# Patient Record
Sex: MALE | Race: BLACK OR AFRICAN AMERICAN | NOT HISPANIC OR LATINO | Employment: OTHER | URBAN - METROPOLITAN AREA
[De-identification: names, ages, dates, MRNs, and addresses within clinical notes are randomized per-mention and may not be internally consistent; named-entity substitution may affect disease eponyms.]

---

## 2018-05-21 ENCOUNTER — TRANSCRIBE ORDERS (OUTPATIENT)
Dept: ADMINISTRATIVE | Facility: HOSPITAL | Age: 68
End: 2018-05-21

## 2018-05-21 DIAGNOSIS — M48.061 LUMBAR FORAMINAL STENOSIS: Primary | ICD-10-CM

## 2018-05-30 ENCOUNTER — HOSPITAL ENCOUNTER (OUTPATIENT)
Dept: MRI IMAGING | Facility: HOSPITAL | Age: 68
Discharge: HOME | End: 2018-05-30
Attending: NEUROLOGICAL SURGERY | Admitting: NEUROLOGICAL SURGERY

## 2018-05-30 DIAGNOSIS — M48.061 LUMBAR FORAMINAL STENOSIS: ICD-10-CM

## 2018-05-30 LAB — CREAT BLDA-MCNC: 0.8 MG/DL (ref 0.6–1.3)

## 2018-05-30 PROCEDURE — 82565 ASSAY OF CREATININE: CPT

## 2018-05-30 PROCEDURE — 0 GADOBENATE DIMEGLUMINE 529 MG/ML SOLUTION: Performed by: NEUROLOGICAL SURGERY

## 2018-05-30 PROCEDURE — A9577 INJ MULTIHANCE: HCPCS | Performed by: NEUROLOGICAL SURGERY

## 2018-05-30 PROCEDURE — 72158 MRI LUMBAR SPINE W/O & W/DYE: CPT

## 2018-05-30 RX ADMIN — GADOBENATE DIMEGLUMINE 20 ML: 529 INJECTION, SOLUTION INTRAVENOUS at 09:00

## 2018-09-05 ENCOUNTER — TRANSCRIBE ORDERS (OUTPATIENT)
Dept: ADMINISTRATIVE | Facility: HOSPITAL | Age: 68
End: 2018-09-05

## 2018-09-05 ENCOUNTER — HOSPITAL ENCOUNTER (OUTPATIENT)
Dept: GENERAL RADIOLOGY | Facility: HOSPITAL | Age: 68
Discharge: HOME | End: 2018-09-05
Attending: NEUROLOGICAL SURGERY | Admitting: NEUROLOGICAL SURGERY

## 2018-09-05 DIAGNOSIS — M54.9 BACK PAIN DUE TO INJURY: Primary | ICD-10-CM

## 2018-09-05 DIAGNOSIS — M54.9 BACK PAIN DUE TO INJURY: ICD-10-CM

## 2018-09-05 PROCEDURE — 72114 X-RAY EXAM L-S SPINE BENDING: CPT

## 2019-12-10 ENCOUNTER — PREP FOR SURGERY (OUTPATIENT)
Dept: OTHER | Facility: HOSPITAL | Age: 69
End: 2019-12-10

## 2019-12-10 DIAGNOSIS — D12.6 ADENOMATOUS POLYP OF COLON, UNSPECIFIED PART OF COLON: Primary | ICD-10-CM

## 2020-01-08 PROBLEM — D12.6 ADENOMATOUS POLYP OF COLON: Status: ACTIVE | Noted: 2020-01-08

## 2020-03-11 ENCOUNTER — ANESTHESIA (OUTPATIENT)
Dept: GASTROENTEROLOGY | Facility: HOSPITAL | Age: 70
End: 2020-03-11

## 2020-03-11 ENCOUNTER — ANESTHESIA EVENT (OUTPATIENT)
Dept: GASTROENTEROLOGY | Facility: HOSPITAL | Age: 70
End: 2020-03-11

## 2020-03-11 ENCOUNTER — HOSPITAL ENCOUNTER (OUTPATIENT)
Facility: HOSPITAL | Age: 70
Setting detail: HOSPITAL OUTPATIENT SURGERY
Discharge: HOME | End: 2020-03-11
Attending: INTERNAL MEDICINE | Admitting: INTERNAL MEDICINE

## 2020-03-11 VITALS
BODY MASS INDEX: 31.38 KG/M2 | RESPIRATION RATE: 14 BRPM | HEIGHT: 74 IN | DIASTOLIC BLOOD PRESSURE: 88 MMHG | HEART RATE: 57 BPM | OXYGEN SATURATION: 98 % | SYSTOLIC BLOOD PRESSURE: 155 MMHG | WEIGHT: 244.5 LBS | TEMPERATURE: 97.6 F

## 2020-03-11 DIAGNOSIS — D12.6 ADENOMATOUS POLYP OF COLON, UNSPECIFIED PART OF COLON: ICD-10-CM

## 2020-03-11 PROCEDURE — 45385 COLONOSCOPY W/LESION REMOVAL: CPT | Performed by: INTERNAL MEDICINE

## 2020-03-11 PROCEDURE — 88305 TISSUE EXAM BY PATHOLOGIST: CPT | Performed by: INTERNAL MEDICINE

## 2020-03-11 PROCEDURE — S0260 H&P FOR SURGERY: HCPCS | Performed by: INTERNAL MEDICINE

## 2020-03-11 PROCEDURE — 25010000002 PROPOFOL 10 MG/ML EMULSION: Performed by: ANESTHESIOLOGY

## 2020-03-11 RX ORDER — PROPOFOL 10 MG/ML
VIAL (ML) INTRAVENOUS CONTINUOUS PRN
Status: DISCONTINUED | OUTPATIENT
Start: 2020-03-11 | End: 2020-03-11 | Stop reason: SURG

## 2020-03-11 RX ORDER — LIDOCAINE HYDROCHLORIDE 20 MG/ML
INJECTION, SOLUTION INFILTRATION; PERINEURAL AS NEEDED
Status: DISCONTINUED | OUTPATIENT
Start: 2020-03-11 | End: 2020-03-11 | Stop reason: SURG

## 2020-03-11 RX ORDER — PROPOFOL 10 MG/ML
VIAL (ML) INTRAVENOUS AS NEEDED
Status: DISCONTINUED | OUTPATIENT
Start: 2020-03-11 | End: 2020-03-11 | Stop reason: SURG

## 2020-03-11 RX ORDER — SODIUM CHLORIDE, SODIUM LACTATE, POTASSIUM CHLORIDE, CALCIUM CHLORIDE 600; 310; 30; 20 MG/100ML; MG/100ML; MG/100ML; MG/100ML
1000 INJECTION, SOLUTION INTRAVENOUS CONTINUOUS
Status: DISCONTINUED | OUTPATIENT
Start: 2020-03-11 | End: 2020-03-11 | Stop reason: HOSPADM

## 2020-03-11 RX ADMIN — PROPOFOL 100 MG: 10 INJECTION, EMULSION INTRAVENOUS at 09:50

## 2020-03-11 RX ADMIN — SODIUM CHLORIDE, POTASSIUM CHLORIDE, SODIUM LACTATE AND CALCIUM CHLORIDE 1000 ML: 600; 310; 30; 20 INJECTION, SOLUTION INTRAVENOUS at 09:36

## 2020-03-11 RX ADMIN — PROPOFOL 100 MCG/KG/MIN: 10 INJECTION, EMULSION INTRAVENOUS at 09:50

## 2020-03-11 RX ADMIN — LIDOCAINE HYDROCHLORIDE 80 MG: 20 INJECTION, SOLUTION INFILTRATION; PERINEURAL at 09:50

## 2020-03-11 NOTE — ANESTHESIA POSTPROCEDURE EVALUATION
"Patient: Orion Casiano    Procedure Summary     Date:  03/11/20 Room / Location:  [facility] ENDOSCOPY 10 / [facility] ENDOSCOPY    Anesthesia Start:  0939 Anesthesia Stop:  1022    Procedure:  COLONOSCOPY TO CECUM AND TI WITH COLD AND HOT SNARE AND POLYPECTOMIES (N/A ) Diagnosis:       Adenomatous polyp of colon, unspecified part of colon      (Adenomatous polyp of colon, unspecified part of colon [D12.6])    Surgeon:  Patti Navarro MD Provider:  Brandin Merida MD    Anesthesia Type:  MAC ASA Status:  3          Anesthesia Type: MAC    Vitals  Vitals Value Taken Time   /70 3/11/2020 10:22 AM   Temp     Pulse 62 3/11/2020 10:22 AM   Resp 14 3/11/2020 10:22 AM   SpO2 99 % 3/11/2020 10:22 AM           Post Anesthesia Care and Evaluation    Patient location during evaluation: bedside  Patient participation: complete - patient participated  Level of consciousness: awake and alert  Pain management: adequate  Airway patency: patent  Anesthetic complications: No anesthetic complications    Cardiovascular status: acceptable  Respiratory status: acceptable  Hydration status: acceptable    Comments: /70   Pulse 62   Temp 36.4 °C (97.6 °F) (Oral)   Resp 14   Ht 188 cm (74\")   Wt 111 kg (244 lb 8 oz)   SpO2 99%   BMI 31.39 kg/m²       "

## 2020-03-11 NOTE — ANESTHESIA PREPROCEDURE EVALUATION
Anesthesia Evaluation     Patient summary reviewed and Nursing notes reviewed   no history of anesthetic complications:  NPO Solid Status: > 8 hours  NPO Liquid Status: > 2 hours           Airway   Mallampati: II  Dental      Pulmonary - normal exam   (+) asthma,  Cardiovascular - normal exam    (+) hypertension 2 medications or greater, hyperlipidemia,       Neuro/Psych- negative ROS  GI/Hepatic/Renal/Endo    (+) obesity,       Musculoskeletal     Abdominal    Substance History      OB/GYN          Other                        Anesthesia Plan    ASA 3     MAC     intravenous induction     Anesthetic plan, all risks, benefits, and alternatives have been provided, discussed and informed consent has been obtained with: patient.

## 2020-03-11 NOTE — H&P
"[facility]  Pre Procedure History & Physical    Chief Complaint:   H/o adenomatous polyp    Subjective     HPI:   70 yo here today for colonoscopy.  Pt reports no FH CRC/polyps.  Patient denies GI symptoms currrently.  Last exam 2016- [place] x5    Past Medical History:   Past Medical History:   Diagnosis Date   • Asthma    • Gout    • Hyperlipidemia    • Hypertension        Past Surgical History:  Past Surgical History:   Procedure Laterality Date   • COLONOSCOPY      maybe 2016   • LUMBAR DISC SURGERY      2008       Family History:  Family History   Family history unknown: Yes       Social History:   reports that he has never smoked. He has never used smokeless tobacco. He reports that he drinks alcohol. Drug use questions deferred to the physician.    Medications:   Medications Prior to Admission   Medication Sig Dispense Refill Last Dose   • allopurinol (ZYLOPRIM) 100 MG tablet    3/11/2020 at Unknown time   • amLODIPine (NORVASC) 10 MG tablet Take  by mouth Daily.   3/10/2020 at Unknown time   • hydroCHLOROthiazide (HYDRODIURIL) 25 MG tablet Take 2 tablets by mouth Daily. 60 tablet 0 3/10/2020 at Unknown time   • lisinopril (PRINIVIL,ZESTRIL) 10 MG tablet    3/10/2020 at Unknown time   • montelukast (SINGULAIR) 10 MG tablet Take  by mouth Daily.   3/10/2020 at Unknown time   • rosuvastatin (CRESTOR) 10 MG tablet    3/10/2020 at Unknown time   • albuterol sulfate HFA (VENTOLIN HFA) 108 (90 Base) MCG/ACT inhaler 2 (Two) Times a Day.   More than a month at Unknown time   • methylPREDNISolone (MEDROL, PJ,) 4 MG tablet Take as directed on package instructions. 21 each 0        Allergies:  Aspirin    ROS:    Pertinent items are noted in HPI, all other systems reviewed and negative     Objective     Blood pressure 156/84, pulse 57, temperature 97.6 °F (36.4 °C), temperature source Oral, resp. rate 15, height 188 cm (74\"), weight 111 kg (244 lb 8 oz), SpO2 100 %.    Physical Exam   Constitutional: " Pt is oriented to person, place, and time and well-developed, well-nourished, and in no distress.   Mouth/Throat: Oropharynx is clear and moist.   Neck: Normal range of motion.   Cardiovascular: Normal rate, regular rhythm    Pulmonary/Chest: Effort normal    Abdominal: Soft. Nontender  Skin: Skin is warm and dry.   Psychiatric: Mood, memory, affect and judgment normal.     Assessment/Plan     Diagnosis:  h/o of adenomatous polyps    Anticipated Surgical Procedure:  colonoscopy    The risks, benefits, and alternatives of this procedure have been discussed with the patient or the responsible party- the patient understands and agrees to proceed.

## 2020-03-12 LAB
CYTO UR: NORMAL
LAB AP CASE REPORT: NORMAL
PATH REPORT.FINAL DX SPEC: NORMAL
PATH REPORT.GROSS SPEC: NORMAL

## 2020-03-16 ENCOUNTER — TELEPHONE (OUTPATIENT)
Dept: GASTROENTEROLOGY | Facility: CLINIC | Age: 70
End: 2020-03-16

## 2020-03-18 NOTE — TELEPHONE ENCOUNTER
Called pt and advised of Dr Navarro's note. Pt verb understanding.     C/s placed in recall for 03/11/2023.

## 2020-07-29 ENCOUNTER — TRANSCRIBE ORDERS (OUTPATIENT)
Dept: ADMINISTRATIVE | Facility: HOSPITAL | Age: 70
End: 2020-07-29

## 2020-07-29 DIAGNOSIS — M48.061 SPINAL STENOSIS, LUMBAR REGION, WITHOUT NEUROGENIC CLAUDICATION: ICD-10-CM

## 2020-07-29 DIAGNOSIS — M48.02 STENOSIS OF CERVICAL SPINE: Primary | ICD-10-CM

## 2020-08-08 ENCOUNTER — APPOINTMENT (OUTPATIENT)
Dept: MRI IMAGING | Facility: HOSPITAL | Age: 70
End: 2020-08-08

## 2020-09-08 ENCOUNTER — LAB (OUTPATIENT)
Dept: LAB | Facility: HOSPITAL | Age: 70
End: 2020-09-08

## 2020-09-08 ENCOUNTER — HOSPITAL ENCOUNTER (OUTPATIENT)
Dept: GENERAL RADIOLOGY | Facility: HOSPITAL | Age: 70
Discharge: HOME | End: 2020-09-08

## 2020-09-08 ENCOUNTER — TRANSCRIBE ORDERS (OUTPATIENT)
Dept: CARDIOLOGY | Facility: HOSPITAL | Age: 70
End: 2020-09-08

## 2020-09-08 ENCOUNTER — HOSPITAL ENCOUNTER (OUTPATIENT)
Dept: CARDIOLOGY | Facility: HOSPITAL | Age: 70
Discharge: HOME | End: 2020-09-08

## 2020-09-08 DIAGNOSIS — R79.1 ABNORMAL COAGULATION PROFILE: ICD-10-CM

## 2020-09-08 DIAGNOSIS — Z01.818 PRE-OP EXAM: ICD-10-CM

## 2020-09-08 DIAGNOSIS — G72.9 CERVICAL MYOPATHY: ICD-10-CM

## 2020-09-08 DIAGNOSIS — Z01.811 PRE-OP CHEST EXAM: ICD-10-CM

## 2020-09-08 DIAGNOSIS — Z01.818 PRE-OP EXAM: Primary | ICD-10-CM

## 2020-09-08 DIAGNOSIS — R94.5 ABNORMAL RESULTS OF LIVER FUNCTION STUDIES: ICD-10-CM

## 2020-09-08 LAB
ALBUMIN SERPL-MCNC: 4 G/DL (ref 3.5–5.2)
ALBUMIN/GLOB SERPL: 1.3 G/DL
ALP SERPL-CCNC: 79 U/L (ref 39–117)
ALT SERPL W P-5'-P-CCNC: 36 U/L (ref 1–41)
ANION GAP SERPL CALCULATED.3IONS-SCNC: 7.8 MMOL/L (ref 5–15)
APTT PPP: 24.4 SECONDS (ref 22.7–35.4)
AST SERPL-CCNC: 33 U/L (ref 1–40)
BILIRUB SERPL-MCNC: 1.3 MG/DL (ref 0–1.2)
BILIRUB UR QL STRIP: NEGATIVE
BUN SERPL-MCNC: 15 MG/DL (ref 8–23)
BUN/CREAT SERPL: 15.6 (ref 7–25)
CALCIUM SPEC-SCNC: 9.6 MG/DL (ref 8.6–10.5)
CHLORIDE SERPL-SCNC: 97 MMOL/L (ref 98–107)
CLARITY UR: CLEAR
CO2 SERPL-SCNC: 29.2 MMOL/L (ref 22–29)
COLOR UR: YELLOW
CREAT SERPL-MCNC: 0.96 MG/DL (ref 0.76–1.27)
DEPRECATED RDW RBC AUTO: 40.6 FL (ref 37–54)
ERYTHROCYTE [DISTWIDTH] IN BLOOD BY AUTOMATED COUNT: 11.9 % (ref 12.3–15.4)
GFR SERPL CREATININE-BSD FRML MDRD: 94 ML/MIN/1.73
GLOBULIN UR ELPH-MCNC: 3.1 GM/DL
GLUCOSE SERPL-MCNC: 108 MG/DL (ref 65–99)
GLUCOSE UR STRIP-MCNC: NEGATIVE MG/DL
HCT VFR BLD AUTO: 41.5 % (ref 37.5–51)
HGB BLD-MCNC: 13.5 G/DL (ref 13–17.7)
HGB UR QL STRIP.AUTO: NEGATIVE
INR PPP: 1.04 (ref 0.9–1.1)
KETONES UR QL STRIP: NEGATIVE
LEUKOCYTE ESTERASE UR QL STRIP.AUTO: NEGATIVE
MCH RBC QN AUTO: 29.9 PG (ref 26.6–33)
MCHC RBC AUTO-ENTMCNC: 32.5 G/DL (ref 31.5–35.7)
MCV RBC AUTO: 92 FL (ref 79–97)
NITRITE UR QL STRIP: NEGATIVE
PH UR STRIP.AUTO: 7 [PH] (ref 5–8)
PLATELET # BLD AUTO: 193 10*3/MM3 (ref 140–450)
PMV BLD AUTO: 11.4 FL (ref 6–12)
POTASSIUM SERPL-SCNC: 4.2 MMOL/L (ref 3.5–5.2)
PROT SERPL-MCNC: 7.1 G/DL (ref 6–8.5)
PROT UR QL STRIP: NEGATIVE
PROTHROMBIN TIME: 13.5 SECONDS (ref 11.7–14.2)
RBC # BLD AUTO: 4.51 10*6/MM3 (ref 4.14–5.8)
SODIUM SERPL-SCNC: 134 MMOL/L (ref 136–145)
SP GR UR STRIP: 1.02 (ref 1–1.03)
UROBILINOGEN UR QL STRIP: NORMAL
WBC # BLD AUTO: 5.29 10*3/MM3 (ref 3.4–10.8)

## 2020-09-08 PROCEDURE — 85027 COMPLETE CBC AUTOMATED: CPT

## 2020-09-08 PROCEDURE — 85730 THROMBOPLASTIN TIME PARTIAL: CPT

## 2020-09-08 PROCEDURE — 80053 COMPREHEN METABOLIC PANEL: CPT

## 2020-09-08 PROCEDURE — 36415 COLL VENOUS BLD VENIPUNCTURE: CPT

## 2020-09-08 PROCEDURE — 93010 ELECTROCARDIOGRAM REPORT: CPT | Performed by: INTERNAL MEDICINE

## 2020-09-08 PROCEDURE — 81003 URINALYSIS AUTO W/O SCOPE: CPT

## 2020-09-08 PROCEDURE — 71046 X-RAY EXAM CHEST 2 VIEWS: CPT

## 2020-09-08 PROCEDURE — 93005 ELECTROCARDIOGRAM TRACING: CPT | Performed by: NEUROLOGICAL SURGERY

## 2020-09-08 PROCEDURE — 85610 PROTHROMBIN TIME: CPT

## 2020-10-21 ENCOUNTER — HOSPITAL ENCOUNTER (OUTPATIENT)
Dept: GENERAL RADIOLOGY | Facility: HOSPITAL | Age: 70
Discharge: HOME | End: 2020-10-21
Admitting: NEUROLOGICAL SURGERY

## 2020-10-21 DIAGNOSIS — R52 PAIN: ICD-10-CM

## 2020-10-21 PROCEDURE — 72050 X-RAY EXAM NECK SPINE 4/5VWS: CPT

## 2020-11-16 ENCOUNTER — HOSPITAL ENCOUNTER (OUTPATIENT)
Dept: GENERAL RADIOLOGY | Facility: HOSPITAL | Age: 70
Discharge: HOME | End: 2020-11-16
Admitting: NEUROLOGICAL SURGERY

## 2020-11-16 DIAGNOSIS — N88.2 CERVICAL STENOSIS (UTERINE CERVIX): ICD-10-CM

## 2020-11-16 DIAGNOSIS — G72.9 CERVICAL MYOPATHY: ICD-10-CM

## 2020-11-16 PROCEDURE — 72050 X-RAY EXAM NECK SPINE 4/5VWS: CPT

## 2021-01-11 ENCOUNTER — HOSPITAL ENCOUNTER (OUTPATIENT)
Dept: GENERAL RADIOLOGY | Facility: HOSPITAL | Age: 71
Discharge: HOME | End: 2021-01-11
Admitting: NEUROLOGICAL SURGERY

## 2021-01-11 DIAGNOSIS — M54.2 NECK PAIN: ICD-10-CM

## 2021-01-11 PROCEDURE — 72040 X-RAY EXAM NECK SPINE 2-3 VW: CPT

## 2021-01-18 ENCOUNTER — TRANSCRIBE ORDERS (OUTPATIENT)
Dept: ADMINISTRATIVE | Facility: HOSPITAL | Age: 71
End: 2021-01-18

## 2021-01-18 ENCOUNTER — HOSPITAL ENCOUNTER (OUTPATIENT)
Dept: GENERAL RADIOLOGY | Facility: HOSPITAL | Age: 71
Discharge: HOME | End: 2021-01-18

## 2021-01-18 ENCOUNTER — HOSPITAL ENCOUNTER (OUTPATIENT)
Dept: CARDIOLOGY | Facility: HOSPITAL | Age: 71
Discharge: HOME | End: 2021-01-18

## 2021-01-18 ENCOUNTER — LAB (OUTPATIENT)
Dept: LAB | Facility: HOSPITAL | Age: 71
End: 2021-01-18

## 2021-01-18 DIAGNOSIS — Z01.818 PRE-OP TESTING: ICD-10-CM

## 2021-01-18 DIAGNOSIS — R79.1 ABNORMAL COAGULATION PROFILE: ICD-10-CM

## 2021-01-18 DIAGNOSIS — Z01.818 PREOP TESTING: ICD-10-CM

## 2021-01-18 DIAGNOSIS — Z01.818 PRE-OP TESTING: Primary | ICD-10-CM

## 2021-01-18 LAB
ALBUMIN SERPL-MCNC: 4.2 G/DL (ref 3.5–5.2)
ALBUMIN/GLOB SERPL: 1.5 G/DL
ALP SERPL-CCNC: 85 U/L (ref 39–117)
ALT SERPL W P-5'-P-CCNC: 25 U/L (ref 1–41)
ANION GAP SERPL CALCULATED.3IONS-SCNC: 7 MMOL/L (ref 5–15)
APTT PPP: 24.8 SECONDS (ref 22.7–35.4)
AST SERPL-CCNC: 36 U/L (ref 1–40)
BILIRUB SERPL-MCNC: 0.8 MG/DL (ref 0–1.2)
BILIRUB UR QL STRIP: NEGATIVE
BUN SERPL-MCNC: 13 MG/DL (ref 8–23)
BUN/CREAT SERPL: 15.7 (ref 7–25)
CALCIUM SPEC-SCNC: 9.3 MG/DL (ref 8.6–10.5)
CHLORIDE SERPL-SCNC: 101 MMOL/L (ref 98–107)
CLARITY UR: CLEAR
CO2 SERPL-SCNC: 28 MMOL/L (ref 22–29)
COLOR UR: YELLOW
CREAT SERPL-MCNC: 0.83 MG/DL (ref 0.76–1.27)
DEPRECATED RDW RBC AUTO: 40.5 FL (ref 37–54)
EOSINOPHIL # BLD MANUAL: 0.49 10*3/MM3 (ref 0–0.4)
EOSINOPHIL NFR BLD MANUAL: 8.2 % (ref 0.3–6.2)
ERYTHROCYTE [DISTWIDTH] IN BLOOD BY AUTOMATED COUNT: 11.9 % (ref 12.3–15.4)
GFR SERPL CREATININE-BSD FRML MDRD: 111 ML/MIN/1.73
GLOBULIN UR ELPH-MCNC: 2.8 GM/DL
GLUCOSE SERPL-MCNC: 96 MG/DL (ref 65–99)
GLUCOSE UR STRIP-MCNC: NEGATIVE MG/DL
HCT VFR BLD AUTO: 42.1 % (ref 37.5–51)
HGB BLD-MCNC: 13.3 G/DL (ref 13–17.7)
HGB UR QL STRIP.AUTO: NEGATIVE
INR PPP: 0.99 (ref 0.9–1.1)
KETONES UR QL STRIP: NEGATIVE
LEUKOCYTE ESTERASE UR QL STRIP.AUTO: NEGATIVE
LYMPHOCYTES # BLD MANUAL: 1.55 10*3/MM3 (ref 0.7–3.1)
LYMPHOCYTES NFR BLD MANUAL: 25.8 % (ref 19.6–45.3)
LYMPHOCYTES NFR BLD MANUAL: 7.2 % (ref 5–12)
MCH RBC QN AUTO: 29.7 PG (ref 26.6–33)
MCHC RBC AUTO-ENTMCNC: 31.6 G/DL (ref 31.5–35.7)
MCV RBC AUTO: 94 FL (ref 79–97)
MONOCYTES # BLD AUTO: 0.43 10*3/MM3 (ref 0.1–0.9)
NEUTROPHILS # BLD AUTO: 3.54 10*3/MM3 (ref 1.7–7)
NEUTROPHILS NFR BLD MANUAL: 58.8 % (ref 42.7–76)
NITRITE UR QL STRIP: NEGATIVE
PH UR STRIP.AUTO: 7 [PH] (ref 5–8)
PLAT MORPH BLD: NORMAL
PLATELET # BLD AUTO: 202 10*3/MM3 (ref 140–450)
PMV BLD AUTO: 11.6 FL (ref 6–12)
POTASSIUM SERPL-SCNC: 4.2 MMOL/L (ref 3.5–5.2)
PROT SERPL-MCNC: 7 G/DL (ref 6–8.5)
PROT UR QL STRIP: NEGATIVE
PROTHROMBIN TIME: 12.9 SECONDS (ref 11.7–14.2)
QT INTERVAL: 462 MS
RBC # BLD AUTO: 4.48 10*6/MM3 (ref 4.14–5.8)
RBC MORPH BLD: NORMAL
SODIUM SERPL-SCNC: 136 MMOL/L (ref 136–145)
SP GR UR STRIP: 1.02 (ref 1–1.03)
UROBILINOGEN UR QL STRIP: NORMAL
WBC # BLD AUTO: 6.02 10*3/MM3 (ref 3.4–10.8)
WBC MORPH BLD: NORMAL

## 2021-01-18 PROCEDURE — 81003 URINALYSIS AUTO W/O SCOPE: CPT

## 2021-01-18 PROCEDURE — 85610 PROTHROMBIN TIME: CPT

## 2021-01-18 PROCEDURE — 71046 X-RAY EXAM CHEST 2 VIEWS: CPT

## 2021-01-18 PROCEDURE — 85730 THROMBOPLASTIN TIME PARTIAL: CPT

## 2021-01-18 PROCEDURE — 36415 COLL VENOUS BLD VENIPUNCTURE: CPT

## 2021-01-18 PROCEDURE — 93005 ELECTROCARDIOGRAM TRACING: CPT | Performed by: NEUROLOGICAL SURGERY

## 2021-01-18 PROCEDURE — 93010 ELECTROCARDIOGRAM REPORT: CPT | Performed by: INTERNAL MEDICINE

## 2021-01-18 PROCEDURE — 85007 BL SMEAR W/DIFF WBC COUNT: CPT

## 2021-01-18 PROCEDURE — 85025 COMPLETE CBC W/AUTO DIFF WBC: CPT

## 2021-01-18 PROCEDURE — 80053 COMPREHEN METABOLIC PANEL: CPT

## 2023-02-16 ENCOUNTER — PRE-PROCEDURE SCREENING (OUTPATIENT)
Dept: GASTROENTEROLOGY | Facility: CLINIC | Age: 73
End: 2023-02-16
Payer: MEDICARE

## 2023-02-16 RX ORDER — FLUTICASONE FUROATE AND VILANTEROL 200; 25 UG/1; UG/1
POWDER RESPIRATORY (INHALATION)
COMMUNITY
Start: 2023-02-09

## 2023-02-16 RX ORDER — FLUNISOLIDE 0.25 MG/ML
SOLUTION NASAL
COMMUNITY
Start: 2023-01-06

## 2023-02-16 RX ORDER — TRAZODONE HYDROCHLORIDE 100 MG/1
100 TABLET ORAL DAILY
COMMUNITY
Start: 2023-02-14

## 2023-02-16 RX ORDER — AZATHIOPRINE 50 MG/1
TABLET ORAL DAILY
COMMUNITY
Start: 2022-12-23

## 2023-02-16 RX ORDER — DOXEPIN HYDROCHLORIDE 25 MG/1
CAPSULE ORAL
Status: ON HOLD | COMMUNITY
Start: 2022-12-02 | End: 2023-02-24

## 2023-02-16 RX ORDER — CLOBETASOL PROPIONATE 0.5 MG/G
1 OINTMENT TOPICAL DAILY
COMMUNITY
Start: 2023-01-06 | End: 2023-03-08

## 2023-02-16 RX ORDER — PREDNISONE 20 MG/1
TABLET ORAL
COMMUNITY
Start: 2023-02-09 | End: 2023-02-22 | Stop reason: SDUPTHER

## 2023-02-16 RX ORDER — ACARBOSE 25 MG/1
TABLET ORAL DAILY
COMMUNITY
Start: 2013-07-10 | End: 2023-03-08

## 2023-02-16 RX ORDER — AZELASTINE HYDROCHLORIDE 137 UG/1
2 SPRAY, METERED NASAL DAILY
COMMUNITY
Start: 2023-01-05 | End: 2023-04-09

## 2023-02-16 RX ORDER — CEPHALEXIN 500 MG/1
CAPSULE ORAL 2 TIMES DAILY
COMMUNITY
Start: 2023-02-23 | End: 2023-02-28 | Stop reason: HOSPADM

## 2023-02-16 RX ORDER — NADOLOL 80 MG/1
TABLET ORAL DAILY
COMMUNITY
Start: 2023-02-16 | End: 2023-02-28 | Stop reason: HOSPADM

## 2023-02-16 RX ORDER — KETOCONAZOLE 20 MG/ML
SHAMPOO TOPICAL
Status: ON HOLD | COMMUNITY
End: 2023-02-24

## 2023-02-16 RX ORDER — LISINOPRIL 10 MG/1
TABLET ORAL
Qty: 90 TABLET | Refills: 0 | Status: SHIPPED | OUTPATIENT
Start: 2023-02-16 | End: 2023-02-28 | Stop reason: HOSPADM

## 2023-02-16 RX ORDER — METHOCARBAMOL 500 MG/1
TABLET, FILM COATED ORAL DAILY
COMMUNITY
Start: 2023-01-18

## 2023-02-16 RX ORDER — TADALAFIL 20 MG/1
TABLET ORAL
COMMUNITY
Start: 2022-11-15

## 2023-02-16 NOTE — TELEPHONE ENCOUNTER
LAST SCOPE 3/20 IN Epic --Personal history of polyps--No family history of polyps or colon cancer--No blood thinners--Medications:            acarbose (PRECOSE) 25 MG tablet  albuterol sulfate HFA (VENTOLIN HFA) 108 (90 Base) MCG/ACT inhaler  allopurinol (ZYLOPRIM) 100 MG tablet  amLODIPine (NORVASC) 10 MG tablet  azaTHIOprine (IMURAN) 50 MG tablet  Azelastine HCl 137 MCG/SPRAY solution  cephalexin (KEFLEX) 500 MG capsule  clobetasol (TEMOVATE) 0.05 % ointment    doxepin (SINEquan) 25 MG capsule    flunisolide (NASALIDE) 25 MCG/ACT (0.025%) solution nasal spray    Fluticasone Furoate-Vilanterol (BREO ELLIPTA) 200-25 MCG/ACT inhaler  hydroCHLOROthiazide (HYDRODIURIL) 25 MG tablet  hydrocortisone 2.5 % ointment  ketoconazole (NIZORAL) 2 % shampoo  lisinopril (PRINIVIL,ZESTRIL) 10 MG tablet  methocarbamol (ROBAXIN) 500 MG tablet  methylPREDNISolone (MEDROL, PJ,) 4 MG tablet  montelukast (SINGULAIR) 10 MG tablet    nadolol (CORGARD) 80 MG tablet  predniSONE (DELTASONE) 20 MG tablet  rosuvastatin (CRESTOR) 10 MG tablet    tadalafil (CIALIS) 20 MG tablet    traZODone (DESYREL) 100 MG tablet      QUESTIONNAIRE SCEEENING IN MEDIA &   HAS BEEN SENT TO DOCTOR FOR REVIEW

## 2023-02-16 NOTE — TELEPHONE ENCOUNTER
Rx Refill Note  Requested Prescriptions     Pending Prescriptions Disp Refills   • lisinopril (PRINIVIL,ZESTRIL) 10 MG tablet [Pharmacy Med Name: LISINOPRIL 10 MG TABLET] 90 tablet      Sig: TAKE ONE TABLET BY MOUTH DAILY      Last office visit with prescribing clinician: 11/15/2022  Next office visit with prescribing clinician: Visit date not found   Last Labs Done: 11/15/2022 (Information found in [facility])    Tor Hancock CMA  02/16/23, 13:25 EST

## 2023-02-17 NOTE — TELEPHONE ENCOUNTER
Incoming Refill Request  {Tip  DIRECTIONS Type Rx details below. Pend Rx from patient's med list if dosage and other details match request. Jump to Medication Section:    Medication requested (name and dose): SINGULAR 10 MG ONCE A DAY    Pharmacy where request should be sent: [facility]  Additional details provided by patient:     Best call back number:     Does the patient have less than a 3 day supply:  [] Yes  [x] No    Man Reynoso Rep  02/17/23, 11:41 EST

## 2023-02-17 NOTE — TELEPHONE ENCOUNTER
Rx Refill Note  Requested Prescriptions     Pending Prescriptions Disp Refills   • montelukast (Singulair) 10 MG tablet       Sig: Take 1 tablet by mouth Every Night.      Last office visit with prescribing clinician: 11/15/2022  Next office visit with prescribing clinician: Visit date not found   Last Labs Done: 11/15/2022 (Information found in [facility])    Tor Hancock CMA  02/17/23, 13:17 EST

## 2023-02-18 RX ORDER — MONTELUKAST SODIUM 10 MG/1
10 TABLET ORAL NIGHTLY
Qty: 90 TABLET | Refills: 1 | Status: SHIPPED | OUTPATIENT
Start: 2023-02-18

## 2023-02-22 ENCOUNTER — HOSPITAL ENCOUNTER (EMERGENCY)
Facility: HOSPITAL | Age: 73
Discharge: HOME | DRG: 167 | End: 2023-02-22
Attending: EMERGENCY MEDICINE | Admitting: EMERGENCY MEDICINE
Payer: MEDICARE

## 2023-02-22 ENCOUNTER — APPOINTMENT (OUTPATIENT)
Dept: CT IMAGING | Facility: HOSPITAL | Age: 73
DRG: 167 | End: 2023-02-22
Payer: MEDICARE

## 2023-02-22 VITALS
RESPIRATION RATE: 20 BRPM | WEIGHT: 225 LBS | HEART RATE: 57 BPM | BODY MASS INDEX: 28.88 KG/M2 | SYSTOLIC BLOOD PRESSURE: 101 MMHG | TEMPERATURE: 97 F | HEIGHT: 74 IN | DIASTOLIC BLOOD PRESSURE: 89 MMHG | OXYGEN SATURATION: 91 %

## 2023-02-22 DIAGNOSIS — J84.9 INTERSTITIAL LUNG DISEASE: Primary | ICD-10-CM

## 2023-02-22 DIAGNOSIS — J18.9 PNEUMONIA OF RIGHT LOWER LOBE DUE TO INFECTIOUS ORGANISM: ICD-10-CM

## 2023-02-22 LAB
ALBUMIN SERPL-MCNC: 4.1 G/DL (ref 3.5–5.2)
ALBUMIN/GLOB SERPL: 1.3 G/DL
ALP SERPL-CCNC: 124 U/L (ref 39–117)
ALT SERPL W P-5'-P-CCNC: 16 U/L (ref 1–41)
ANION GAP SERPL CALCULATED.3IONS-SCNC: 10.3 MMOL/L (ref 5–15)
AST SERPL-CCNC: 24 U/L (ref 1–40)
B PARAPERT DNA SPEC QL NAA+PROBE: NOT DETECTED
B PERT DNA SPEC QL NAA+PROBE: NOT DETECTED
BASOPHILS # BLD AUTO: 0.08 10*3/MM3 (ref 0–0.2)
BASOPHILS NFR BLD AUTO: 1 % (ref 0–1.5)
BILIRUB SERPL-MCNC: 1.6 MG/DL (ref 0–1.2)
BUN SERPL-MCNC: 13 MG/DL (ref 8–23)
BUN/CREAT SERPL: 11.5 (ref 7–25)
C PNEUM DNA NPH QL NAA+NON-PROBE: NOT DETECTED
CALCIUM SPEC-SCNC: 9.9 MG/DL (ref 8.6–10.5)
CHLORIDE SERPL-SCNC: 103 MMOL/L (ref 98–107)
CO2 SERPL-SCNC: 27.7 MMOL/L (ref 22–29)
CREAT SERPL-MCNC: 1.13 MG/DL (ref 0.76–1.27)
DEPRECATED RDW RBC AUTO: 44.2 FL (ref 37–54)
EGFRCR SERPLBLD CKD-EPI 2021: 69.1 ML/MIN/1.73
EOSINOPHIL # BLD AUTO: 0.85 10*3/MM3 (ref 0–0.4)
EOSINOPHIL NFR BLD AUTO: 10.3 % (ref 0.3–6.2)
ERYTHROCYTE [DISTWIDTH] IN BLOOD BY AUTOMATED COUNT: 12.8 % (ref 12.3–15.4)
FLUAV SUBTYP SPEC NAA+PROBE: NOT DETECTED
FLUBV RNA ISLT QL NAA+PROBE: NOT DETECTED
GLOBULIN UR ELPH-MCNC: 3.2 GM/DL
GLUCOSE SERPL-MCNC: 92 MG/DL (ref 65–99)
HADV DNA SPEC NAA+PROBE: NOT DETECTED
HCOV 229E RNA SPEC QL NAA+PROBE: NOT DETECTED
HCOV HKU1 RNA SPEC QL NAA+PROBE: NOT DETECTED
HCOV NL63 RNA SPEC QL NAA+PROBE: NOT DETECTED
HCOV OC43 RNA SPEC QL NAA+PROBE: NOT DETECTED
HCT VFR BLD AUTO: 40.7 % (ref 37.5–51)
HGB BLD-MCNC: 13.6 G/DL (ref 13–17.7)
HMPV RNA NPH QL NAA+NON-PROBE: NOT DETECTED
HOLD SPECIMEN: NORMAL
HOLD SPECIMEN: NORMAL
HPIV1 RNA ISLT QL NAA+PROBE: NOT DETECTED
HPIV2 RNA SPEC QL NAA+PROBE: NOT DETECTED
HPIV3 RNA NPH QL NAA+PROBE: NOT DETECTED
HPIV4 P GENE NPH QL NAA+PROBE: NOT DETECTED
IMM GRANULOCYTES # BLD AUTO: 0.02 10*3/MM3 (ref 0–0.05)
IMM GRANULOCYTES NFR BLD AUTO: 0.2 % (ref 0–0.5)
LYMPHOCYTES # BLD AUTO: 2.6 10*3/MM3 (ref 0.7–3.1)
LYMPHOCYTES NFR BLD AUTO: 31.6 % (ref 19.6–45.3)
M PNEUMO IGG SER IA-ACNC: NOT DETECTED
MCH RBC QN AUTO: 31.4 PG (ref 26.6–33)
MCHC RBC AUTO-ENTMCNC: 33.4 G/DL (ref 31.5–35.7)
MCV RBC AUTO: 94 FL (ref 79–97)
MONOCYTES # BLD AUTO: 0.82 10*3/MM3 (ref 0.1–0.9)
MONOCYTES NFR BLD AUTO: 10 % (ref 5–12)
NEUTROPHILS NFR BLD AUTO: 3.87 10*3/MM3 (ref 1.7–7)
NEUTROPHILS NFR BLD AUTO: 46.9 % (ref 42.7–76)
NRBC BLD AUTO-RTO: 0.1 /100 WBC (ref 0–0.2)
NT-PROBNP SERPL-MCNC: 1275 PG/ML (ref 0–900)
PLATELET # BLD AUTO: 215 10*3/MM3 (ref 140–450)
PMV BLD AUTO: 10.3 FL (ref 6–12)
POTASSIUM SERPL-SCNC: 4.6 MMOL/L (ref 3.5–5.2)
PROT SERPL-MCNC: 7.3 G/DL (ref 6–8.5)
RBC # BLD AUTO: 4.33 10*6/MM3 (ref 4.14–5.8)
RHINOVIRUS RNA SPEC NAA+PROBE: NOT DETECTED
RSV RNA NPH QL NAA+NON-PROBE: NOT DETECTED
SARS-COV-2 RNA NPH QL NAA+NON-PROBE: NOT DETECTED
SODIUM SERPL-SCNC: 141 MMOL/L (ref 136–145)
WBC NRBC COR # BLD: 8.24 10*3/MM3 (ref 3.4–10.8)
WHOLE BLOOD HOLD COAG: NORMAL
WHOLE BLOOD HOLD SPECIMEN: NORMAL

## 2023-02-22 PROCEDURE — 36415 COLL VENOUS BLD VENIPUNCTURE: CPT

## 2023-02-22 PROCEDURE — 71275 CT ANGIOGRAPHY CHEST: CPT

## 2023-02-22 PROCEDURE — 25510000001 IOPAMIDOL PER 1 ML: Performed by: EMERGENCY MEDICINE

## 2023-02-22 PROCEDURE — 0 IOPAMIDOL PER 1 ML: Performed by: EMERGENCY MEDICINE

## 2023-02-22 PROCEDURE — 80053 COMPREHEN METABOLIC PANEL: CPT | Performed by: EMERGENCY MEDICINE

## 2023-02-22 PROCEDURE — 99283 EMERGENCY DEPT VISIT LOW MDM: CPT

## 2023-02-22 PROCEDURE — 94799 UNLISTED PULMONARY SVC/PX: CPT

## 2023-02-22 PROCEDURE — 0202U NFCT DS 22 TRGT SARS-COV-2: CPT | Performed by: EMERGENCY MEDICINE

## 2023-02-22 PROCEDURE — 83880 ASSAY OF NATRIURETIC PEPTIDE: CPT | Performed by: EMERGENCY MEDICINE

## 2023-02-22 PROCEDURE — 93005 ELECTROCARDIOGRAM TRACING: CPT | Performed by: EMERGENCY MEDICINE

## 2023-02-22 PROCEDURE — 85025 COMPLETE CBC W/AUTO DIFF WBC: CPT | Performed by: EMERGENCY MEDICINE

## 2023-02-22 PROCEDURE — 94640 AIRWAY INHALATION TREATMENT: CPT

## 2023-02-22 PROCEDURE — 36415 COLL VENOUS BLD VENIPUNCTURE: CPT | Performed by: EMERGENCY MEDICINE

## 2023-02-22 PROCEDURE — 93010 ELECTROCARDIOGRAM REPORT: CPT | Performed by: INTERNAL MEDICINE

## 2023-02-22 RX ORDER — PREDNISONE 20 MG/1
40 TABLET ORAL DAILY
Qty: 10 TABLET | Refills: 0 | Status: SHIPPED | OUTPATIENT
Start: 2023-02-22 | End: 2023-03-08

## 2023-02-22 RX ORDER — ALBUTEROL SULFATE 90 UG/1
2 AEROSOL, METERED RESPIRATORY (INHALATION) EVERY 6 HOURS PRN
Qty: 8 G | Refills: 0 | Status: SHIPPED | OUTPATIENT
Start: 2023-02-22

## 2023-02-22 RX ORDER — DOXYCYCLINE 100 MG/1
100 CAPSULE ORAL 2 TIMES DAILY
Qty: 14 CAPSULE | Refills: 0 | Status: SHIPPED | OUTPATIENT
Start: 2023-02-22 | End: 2023-02-28 | Stop reason: HOSPADM

## 2023-02-22 RX ORDER — ALBUTEROL SULFATE 2.5 MG/3ML
2.5 SOLUTION RESPIRATORY (INHALATION) ONCE
Status: COMPLETED | OUTPATIENT
Start: 2023-02-22 | End: 2023-02-22

## 2023-02-22 RX ADMIN — IOPAMIDOL 85 ML: 755 INJECTION, SOLUTION INTRAVENOUS at 20:08

## 2023-02-22 RX ADMIN — ALBUTEROL SULFATE 2.5 MG: 2.5 SOLUTION RESPIRATORY (INHALATION) at 19:19

## 2023-02-22 NOTE — ED TRIAGE NOTES
Pt c/o productive cough that started two weeks ago. Denies fever. Pt was seen at urgent care today and diagnosed with RLL pneumonia. Pt sent for further evaluation    This RN wore mask and goggles during time of contact

## 2023-02-23 ENCOUNTER — PREP FOR SURGERY (OUTPATIENT)
Dept: OTHER | Facility: HOSPITAL | Age: 73
End: 2023-02-23
Payer: MEDICARE

## 2023-02-23 DIAGNOSIS — Z86.010 HISTORY OF ADENOMATOUS POLYP OF COLON: Primary | ICD-10-CM

## 2023-02-23 LAB — QT INTERVAL: 471 MS

## 2023-02-23 NOTE — ED PROVIDER NOTES
EMERGENCY DEPARTMENT ENCOUNTER    Room Number:  18/18  Date seen:  2/23/2023  PCP: Niecy Galvan MD  Historian: Patient      HPI:  Chief Complaint: Cough and shortness of breath  A complete HPI/ROS/PMH/PSH/SH/FH are unobtainable due to: None  Context: Orion Casiano is a 72 y.o. male who presents to the ED c/o shortness of breath.  Patient states he has had cough and congestion for 3 weeks.  Patient states has had no fevers.  States he is coughing up whitish sputum.  Patient has had no chest pain or pressure.  Has had no lower extremity swelling.  Patient went to an urgent care today and was told he had pneumonia and sent here for evaluation.  Has had no weight loss.            PAST MEDICAL HISTORY  Active Ambulatory Problems     Diagnosis Date Noted   • Adenomatous polyp of colon 01/08/2020     Resolved Ambulatory Problems     Diagnosis Date Noted   • No Resolved Ambulatory Problems     Past Medical History:   Diagnosis Date   • Asthma    • Gout    • Hyperlipidemia    • Hypertension          PAST SURGICAL HISTORY  Past Surgical History:   Procedure Laterality Date   • COLONOSCOPY      maybe 2016   • COLONOSCOPY N/A 3/11/2020    Procedure: COLONOSCOPY TO CECUM AND TI WITH COLD AND HOT SNARE AND POLYPECTOMIES;  Surgeon: Patti Navarro MD;  Location: [facility] ENDOSCOPY;  Service: Gastroenterology;  Laterality: N/A;  PRE: H/O COLON POLYPS  POST: POLYPS, HEMORRHOIDS, DIVERTICULOSIS   • LUMBAR DISC SURGERY      2008         FAMILY HISTORY  Family History   Family history unknown: Yes         SOCIAL HISTORY  Social History     Socioeconomic History   • Marital status:    Tobacco Use   • Smoking status: Never   • Smokeless tobacco: Never   Substance and Sexual Activity   • Alcohol use: Yes     Comment: occassionally   • Drug use: Defer   • Sexual activity: Defer         ALLERGIES  Aspirin        REVIEW OF SYSTEMS  Review of Systems   Cough and congestion      PHYSICAL EXAM  ED Triage Vitals [02/22/23  1629]   Temp Heart Rate Resp BP SpO2   97 °F (36.1 °C) 54 20 105/66 100 %      Temp src Heart Rate Source Patient Position BP Location FiO2 (%)   Tympanic Monitor Sitting Left arm --       Physical Exam      GENERAL: no acute distress  HENT: nares patent  EYES: no scleral icterus  CV: regular rhythm, normal rate  RESPIRATORY: normal effort. Wheezes bilaterally  ABDOMEN: soft  MUSCULOSKELETAL: no deformity  NEURO: alert, moves all extremities, follows commands  PSYCH:  calm, cooperative  SKIN: warm, dry    Vital signs and nursing notes reviewed.    Patient was wearing a face mask when I entered the room and they continued to wear a mask throughout their stay in the ED.  I wore PPE, including  face mask with shield or face mask with goggles whenever I was in the room with patient.       LAB RESULTS  Recent Results (from the past 24 hour(s))   Comprehensive Metabolic Panel    Collection Time: 02/22/23  4:45 PM    Specimen: Blood   Result Value Ref Range    Glucose 92 65 - 99 mg/dL    BUN 13 8 - 23 mg/dL    Creatinine 1.13 0.76 - 1.27 mg/dL    Sodium 141 136 - 145 mmol/L    Potassium 4.6 3.5 - 5.2 mmol/L    Chloride 103 98 - 107 mmol/L    CO2 27.7 22.0 - 29.0 mmol/L    Calcium 9.9 8.6 - 10.5 mg/dL    Total Protein 7.3 6.0 - 8.5 g/dL    Albumin 4.1 3.5 - 5.2 g/dL    ALT (SGPT) 16 1 - 41 U/L    AST (SGOT) 24 1 - 40 U/L    Alkaline Phosphatase 124 (H) 39 - 117 U/L    Total Bilirubin 1.6 (H) 0.0 - 1.2 mg/dL    Globulin 3.2 gm/dL    A/G Ratio 1.3 g/dL    BUN/Creatinine Ratio 11.5 7.0 - 25.0    Anion Gap 10.3 5.0 - 15.0 mmol/L    eGFR 69.1 >60.0 mL/min/1.73   CBC Auto Differential    Collection Time: 02/22/23  4:45 PM    Specimen: Blood   Result Value Ref Range    WBC 8.24 3.40 - 10.80 10*3/mm3    RBC 4.33 4.14 - 5.80 10*6/mm3    Hemoglobin 13.6 13.0 - 17.7 g/dL    Hematocrit 40.7 37.5 - 51.0 %    MCV 94.0 79.0 - 97.0 fL    MCH 31.4 26.6 - 33.0 pg    MCHC 33.4 31.5 - 35.7 g/dL    RDW 12.8 12.3 - 15.4 %    RDW-SD 44.2 37.0 -  54.0 fl    MPV 10.3 6.0 - 12.0 fL    Platelets 215 140 - 450 10*3/mm3    Neutrophil % 46.9 42.7 - 76.0 %    Lymphocyte % 31.6 19.6 - 45.3 %    Monocyte % 10.0 5.0 - 12.0 %    Eosinophil % 10.3 (H) 0.3 - 6.2 %    Basophil % 1.0 0.0 - 1.5 %    Immature Grans % 0.2 0.0 - 0.5 %    Neutrophils, Absolute 3.87 1.70 - 7.00 10*3/mm3    Lymphocytes, Absolute 2.60 0.70 - 3.10 10*3/mm3    Monocytes, Absolute 0.82 0.10 - 0.90 10*3/mm3    Eosinophils, Absolute 0.85 (H) 0.00 - 0.40 10*3/mm3    Basophils, Absolute 0.08 0.00 - 0.20 10*3/mm3    Immature Grans, Absolute 0.02 0.00 - 0.05 10*3/mm3    nRBC 0.1 0.0 - 0.2 /100 WBC   Green Top (Gel)    Collection Time: 02/22/23  4:45 PM   Result Value Ref Range    Extra Tube Hold for add-ons.    Lavender Top    Collection Time: 02/22/23  4:45 PM   Result Value Ref Range    Extra Tube hold for add-on    Gold Top - SST    Collection Time: 02/22/23  4:45 PM   Result Value Ref Range    Extra Tube Hold for add-ons.    Light Blue Top    Collection Time: 02/22/23  4:45 PM   Result Value Ref Range    Extra Tube Hold for add-ons.    BNP    Collection Time: 02/22/23  4:45 PM    Specimen: Blood   Result Value Ref Range    proBNP 1,275.0 (H) 0.0 - 900.0 pg/mL   Respiratory Panel PCR w/COVID-19(SARS-CoV-2) [facility] In-House, NP Swab in [facility], 3-4 HR TAT - Swab, Nasopharynx    Collection Time: 02/22/23  7:08 PM    Specimen: Nasopharynx; Swab   Result Value Ref Range    ADENOVIRUS, PCR Not Detected Not Detected    Coronavirus 229E Not Detected Not Detected    Coronavirus HKU1 Not Detected Not Detected    Coronavirus NL63 Not Detected Not Detected    Coronavirus OC43 Not Detected Not Detected    COVID19 Not Detected Not Detected - Ref. Range    Human Metapneumovirus Not Detected Not Detected    Human Rhinovirus/Enterovirus Not Detected Not Detected    Influenza A PCR Not Detected Not Detected    Influenza B PCR Not Detected Not Detected    Parainfluenza Virus 1 Not Detected Not  Detected    Parainfluenza Virus 2 Not Detected Not Detected    Parainfluenza Virus 3 Not Detected Not Detected    Parainfluenza Virus 4 Not Detected Not Detected    RSV, PCR Not Detected Not Detected    Bordetella pertussis pcr Not Detected Not Detected    Bordetella parapertussis PCR Not Detected Not Detected    Chlamydophila pneumoniae PCR Not Detected Not Detected    Mycoplasma pneumo by PCR Not Detected Not Detected   ECG 12 Lead Dyspnea    Collection Time: 02/22/23  7:26 PM   Result Value Ref Range    QT Interval 471 ms       Ordered the above labs and reviewed the results.        RADIOLOGY  XR Chest 2 View    Result Date: 2/22/2023  XR CHEST 2 VW-  HISTORY:  Cough for 2 weeks. Shortness of air.  COMPARISON:  Chest radiograph 1/18/2021.  FINDINGS:  2 views of the chest were obtained. The cardiac silhouette is normal in size. The descending aorta is slightly tortuous. Hilar contours are not significantly changed. There are low lung volumes. There are coarse interstitial opacities throughout the right lung, progressed from 2021, which raises concern for pneumonia superimposed on chronic lung changes versus progression of chronic lung disease. There is questioned new rightward mediastinal shift, which raises concern for a possible underlying lesion. Recommend further evaluation of these findings with CT chest. There is no large pleural effusion. There is partially imaged fusion hardware in the lower cervical spine. There is multilevel degenerative disc disease.  Discussed with Dr. Sharpe at 3:38 PM.  This report was finalized on 2/22/2023 3:40 PM by Dr. Caryn Frost M.D.      CT Angiogram Chest    Result Date: 2/22/2023  CT ANGIOGRAM CHEST-  INDICATIONS: Short of breath.  Radiation dose reduction techniques were utilized, including automated exposure control and exposure modulation based on body size.  TECHNIQUE: CT angiography of the chest. Three-dimensional reconstructions.  COMPARISON: None available   FINDINGS:  No pulmonary embolism. No aortic dissection. Ascending aorta is dilated, 3.8 cm.  The heart size is borderline without pericardial effusion. Mediastinal and hilar adenopathy is noted, nonspecific, could be reactive in nature or potentially evidence of neoplasm such as lymphoma, metastatic disease. For example, right paratracheal lymph node measures 1.6 cm short axis on axial image 52. A subcarinal lymph node measures 1.5 cm short axis. A right hilar lymph node on axial image 77 measures 1.6 cm short axis. Enlarged axillary lymph nodes are also present. For example, right axillary 1.3 cm short axis lymph node on axial image 50; left axillary 1.6 cm short axis lymph node on axial image 41. PET CT correlation can be obtained as indicated, interval follow-up can characterize change  The airways appear clear.  No pleural effusion or pneumothorax.  The lungs show groundglass opacities throughout the right lung, and to lesser extent in the left lung, with areas of traction bronchiectasis/honeycombing. Appearance suggests pulmonary fibrosis, potentially with superimposed acute infiltrative process such as atypical pneumonia.  Upper abdominal structures show an 8 mm nonobstructive stone in the left kidney.  Degenerative changes are seen in the spine. No acute fracture is identified.        1. Interstitial lung disease with nonspecific groundglass opacities in right more than left lungs, potentially representing superimposed acute infiltrative process, correlate clinically. 2. Nonspecific mediastinal, hilar, bilateral axillary adenopathy, may represent neoplastic process, further evaluation advised. 3. No pulmonary embolism.  This report was finalized on 2/22/2023 9:01 PM by Dr. Darwin House M.D.        Ordered the above noted radiological studies.  CT of the chest independently interpreted by me in PACS and felt to have increasing infiltrate right lower lobe          PROCEDURES  Procedures    EKG           EKG time: 1926  Rhythm/Rate: Atrial fibrillation rate 57  P waves and HI: No P waves  QRS, axis: LVH  ST and T waves: Nonspecific ST-T wave    Interpreted Contemporaneously by me, independently viewed  Changed compared to prior 1/18/2021.  Irregular heartbeat appears new          MEDICATIONS GIVEN IN ER  Medications   albuterol (PROVENTIL) nebulizer solution 0.083% 2.5 mg/3mL (2.5 mg Nebulization Given 2/22/23 1919)   iopamidol (ISOVUE-370) 76 % injection 100 mL (85 mL Intravenous Given 2/22/23 2008)                   MEDICAL DECISION MAKING, PROGRESS, and CONSULTS    All labs have been independently reviewed by me.  All radiology studies have been reviewed by me and I have also reviewed the radiology report.   EKG's independently viewed and interpreted by me.  Discussion below represents my analysis of pertinent findings related to patient's condition, differential diagnosis, treatment plan and final disposition.      Additional sources:  - Discussed/ obtained information from independent historians: None    - External (non-ED) record review: Epic reviewed and patient was seen today in urgent care center.  Patient had a chest x-ray that showed increasing right lower lobe markings pneumonia versus mass    - Chronic or social conditions impacting care: None    - Shared decision making: Discussed with patient today strongly feel he needs to be admitted to the hospital.  Patient states he cannot stay in the hospital.  Patient understands risks and he is willing to take these.  Instructed to return here for any concerns      Orders placed during this visit:  Orders Placed This Encounter   Procedures   • Respiratory Panel PCR w/COVID-19(SARS-CoV-2) [facility]/END[facility]/MAD/[facility] In-House, NP Swab in UTM/VTM, 3-4 HR TAT - Swab, Nasopharynx   • CT Angiogram Chest   • Comprehensive Metabolic Panel   • [place] Draw   • CBC Auto Differential   • BNP   • ECG 12 Lead Dyspnea   • CBC & Differential   • Green Top (Gel)   •  Lavender Top   • Gold Top - SST   • Light Blue Top         Additional orders considered but not ordered:  Considered admission however patient has refused.        Differential diagnosis:    Differential includes but not limited to pneumonia, viral pneumonia, pulmonary fibrosis, sarcoid, malignancy      Independent interpretation of labs, radiology studies, and discussions with consultants:  ED Course as of 02/23/23 0008   Wed Feb 22, 2023 2127 21:27 EST  Patient presents for cough congestion shortness of breath.  Patient's O2 sat is borderline.  Patient has CT scan that is markedly abnormal.  I have discussed patient with Dr. Mariann christie and we feel patient should stay in the hospital.  Patient is going to refuse to do this.  States he absolutely cannot stay tonight.  Patient understands to go home and get significantly worse.  Patient states he cannot stay in the hospital.  Understands the risks of leaving.  I told patient I would start him on albuterol and prednisone.  We will also give him antibiotics.  I also told him that I work here tomorrow night and if he has problems he is to return tomorrow night.  Instructed to follow up with Dr. Sullivan. [SL]      ED Course User Index  [SL] Juanito Velazco MD                 DIAGNOSIS  Final diagnoses:   Interstitial lung disease (HCC)   Pneumonia of right lower lobe due to infectious organism         DISPOSITION  DISCHARGE    Patient discharged in stable condition.    Reviewed implications of results, diagnosis, meds, responsibility to follow up, warning signs and symptoms of possible worsening, potential complications and reasons to return to ER, including worsening symptoms    Patient/Family voiced understanding of above instructions.    Discussed plan for discharge, as there is no emergent indication for admission. Patient referred to primary care provider for BP management due to today's BP. Pt/family is agreeable and understands need for follow up and  repeat testing.  Pt is aware that discharge does not mean that nothing is wrong but it indicates no emergency is present that requires admission and they must continue care with follow-up as given below or physician of their choice.     FOLLOW-UP  Niecy Galvan MD  [address] [place] [id number] [phone]    Schedule an appointment as soon as possible for a visit       Justin Sullivan MD  [address]  [facility] [address]  [facility] [id number] [phone]    Schedule an appointment as soon as possible for a visit            Medication List      New Prescriptions    doxycycline 100 MG capsule  Commonly known as: MONODOX  Take 1 capsule by mouth 2 (Two) Times a Day.     predniSONE 20 MG tablet  Commonly known as: DELTASONE  Take 2 tablets by mouth Daily.        Changed    * Ventolin  (90 Base) MCG/ACT inhaler  Generic drug: albuterol sulfate HFA  What changed: Another medication with the same name was added. Make sure you understand how and when to take each.     * albuterol sulfate  (90 Base) MCG/ACT inhaler  Commonly known as: PROVENTIL HFA;VENTOLIN HFA;PROAIR HFA  Inhale 2 puffs Every 6 (Six) Hours As Needed for Wheezing.  What changed: You were already taking a medication with the same name, and this prescription was added. Make sure you understand how and when to take each.         * This list has 2 medication(s) that are the same as other medications prescribed for you. Read the directions carefully, and ask your doctor or other care provider to review them with you.               Where to Get Your Medications      These medications were sent to [facility] [id number] - [facility] [address]  AT [facility] [address] [facility] [phone] [facility] [phone]   [address] [facility] [address]    Phone: [phone]   · albuterol sulfate  (90 Base) MCG/ACT inhaler  · doxycycline 100 MG capsule  · predniSONE 20 MG tablet                 Latest Documented  Vital Signs:  As of 00:08 EST  BP- 101/89 HR- 57 Temp- 97 °F (36.1 °C) (Tympanic) O2 sat- 91%              --    Please note that portions of this were completed with a voice recognition program.       Note Disclaimer: At [facility], we believe that sharing information builds trust and better relationships. You are receiving this note because you are receiving care at [facility] or recently visited. It is possible you will see health information before a provider has talked with you about it. This kind of information can be easy to misunderstand. To help you fully understand what it means for your health, we urge you to discuss this note with your provider.           Juanito Velazco MD  02/23/23 0011

## 2023-02-23 NOTE — PROGRESS NOTES
I was called by Dr Velazco from the emergency room regarding this patient. It appeared that he presented with dyspnea and cough, and his chest xray suggested pneumonia. I recommended he be admitted to the hospital and we would see him in consultation.  When I went down to the ER to see the patient he had left the hospital.

## 2023-02-24 ENCOUNTER — HOSPITAL ENCOUNTER (INPATIENT)
Facility: HOSPITAL | Age: 73
LOS: 4 days | Discharge: HOME | DRG: 167 | End: 2023-02-28
Attending: EMERGENCY MEDICINE | Admitting: INTERNAL MEDICINE
Payer: MEDICARE

## 2023-02-24 ENCOUNTER — APPOINTMENT (OUTPATIENT)
Dept: CARDIOLOGY | Facility: HOSPITAL | Age: 73
DRG: 167 | End: 2023-02-24
Payer: MEDICARE

## 2023-02-24 ENCOUNTER — APPOINTMENT (OUTPATIENT)
Dept: RESPIRATORY THERAPY | Facility: HOSPITAL | Age: 73
DRG: 167 | End: 2023-02-24
Payer: MEDICARE

## 2023-02-24 ENCOUNTER — APPOINTMENT (OUTPATIENT)
Dept: GENERAL RADIOLOGY | Facility: HOSPITAL | Age: 73
DRG: 167 | End: 2023-02-24
Payer: MEDICARE

## 2023-02-24 DIAGNOSIS — E78.5 HYPERLIPIDEMIA, UNSPECIFIED HYPERLIPIDEMIA TYPE: ICD-10-CM

## 2023-02-24 DIAGNOSIS — Z87.09 HISTORY OF ASTHMA: ICD-10-CM

## 2023-02-24 DIAGNOSIS — R59.0 MEDIASTINAL LYMPHADENOPATHY: ICD-10-CM

## 2023-02-24 DIAGNOSIS — R06.00 DYSPNEA, UNSPECIFIED TYPE: Primary | ICD-10-CM

## 2023-02-24 DIAGNOSIS — J96.01 ACUTE RESPIRATORY FAILURE WITH HYPOXIA: ICD-10-CM

## 2023-02-24 DIAGNOSIS — I10 HYPERTENSION, UNSPECIFIED TYPE: ICD-10-CM

## 2023-02-24 DIAGNOSIS — I48.91 ATRIAL FIBRILLATION, UNSPECIFIED TYPE: ICD-10-CM

## 2023-02-24 DIAGNOSIS — J84.9 INTERSTITIAL LUNG DISEASE: ICD-10-CM

## 2023-02-24 DIAGNOSIS — Z09 FOLLOW-UP EXAM: ICD-10-CM

## 2023-02-24 PROBLEM — E87.1 HYPONATREMIA: Status: ACTIVE | Noted: 2023-02-24

## 2023-02-24 PROBLEM — R79.89 ELEVATED TROPONIN: Status: ACTIVE | Noted: 2023-02-24

## 2023-02-24 PROBLEM — G89.29 CHRONIC PAIN: Status: ACTIVE | Noted: 2023-02-24

## 2023-02-24 PROBLEM — M54.16 LUMBAR RADICULOPATHY: Status: ACTIVE | Noted: 2023-02-24

## 2023-02-24 PROBLEM — R77.8 ELEVATED TROPONIN: Status: ACTIVE | Noted: 2023-02-24

## 2023-02-24 LAB
ALBUMIN SERPL-MCNC: 3.7 G/DL (ref 3.5–5.2)
ALBUMIN/GLOB SERPL: 1.1 G/DL
ALP SERPL-CCNC: 128 U/L (ref 39–117)
ALT SERPL W P-5'-P-CCNC: 17 U/L (ref 1–41)
ANION GAP SERPL CALCULATED.3IONS-SCNC: 10 MMOL/L (ref 5–15)
ANION GAP SERPL CALCULATED.3IONS-SCNC: 6.6 MMOL/L (ref 5–15)
AORTIC DIMENSIONLESS INDEX: 0.7 (DI)
ASCENDING AORTA: 3.4 CM
AST SERPL-CCNC: 26 U/L (ref 1–40)
BASOPHILS # BLD MANUAL: 0.06 10*3/MM3 (ref 0–0.2)
BASOPHILS NFR BLD MANUAL: 1 % (ref 0–1.5)
BH CV ECHO MEAS - ACS: 2.02 CM
BH CV ECHO MEAS - AO MAX PG: 9.8 MMHG
BH CV ECHO MEAS - AO MEAN PG: 5.2 MMHG
BH CV ECHO MEAS - AO ROOT DIAM: 3.3 CM
BH CV ECHO MEAS - AO V2 MAX: 156.2 CM/SEC
BH CV ECHO MEAS - AO V2 VTI: 35.4 CM
BH CV ECHO MEAS - AVA(I,D): 2.17 CM2
BH CV ECHO MEAS - EDV(CUBED): 130.5 ML
BH CV ECHO MEAS - EDV(MOD-SP2): 168 ML
BH CV ECHO MEAS - EDV(MOD-SP4): 207 ML
BH CV ECHO MEAS - EF(MOD-BP): 67.8 %
BH CV ECHO MEAS - EF(MOD-SP2): 67.3 %
BH CV ECHO MEAS - EF(MOD-SP4): 67.1 %
BH CV ECHO MEAS - ESV(CUBED): 41.2 ML
BH CV ECHO MEAS - ESV(MOD-SP2): 55 ML
BH CV ECHO MEAS - ESV(MOD-SP4): 68 ML
BH CV ECHO MEAS - FS: 31.9 %
BH CV ECHO MEAS - IVS/LVPW: 0.99 CM
BH CV ECHO MEAS - IVSD: 1.21 CM
BH CV ECHO MEAS - LAT PEAK E' VEL: 10.7 CM/SEC
BH CV ECHO MEAS - LV MASS(C)D: 242.9 GRAMS
BH CV ECHO MEAS - LV MAX PG: 5.1 MMHG
BH CV ECHO MEAS - LV MEAN PG: 2.34 MMHG
BH CV ECHO MEAS - LV V1 MAX: 113.2 CM/SEC
BH CV ECHO MEAS - LV V1 VTI: 26.1 CM
BH CV ECHO MEAS - LVIDD: 5.1 CM
BH CV ECHO MEAS - LVIDS: 3.5 CM
BH CV ECHO MEAS - LVOT AREA: 2.9 CM2
BH CV ECHO MEAS - LVOT DIAM: 1.93 CM
BH CV ECHO MEAS - LVPWD: 1.22 CM
BH CV ECHO MEAS - MED PEAK E' VEL: 7.9 CM/SEC
BH CV ECHO MEAS - MR MAX PG: 119 MMHG
BH CV ECHO MEAS - MR MAX VEL: 545.4 CM/SEC
BH CV ECHO MEAS - MV DEC SLOPE: 880.2 CM/SEC2
BH CV ECHO MEAS - MV DEC TIME: 0.18 MSEC
BH CV ECHO MEAS - MV E MAX VEL: 139 CM/SEC
BH CV ECHO MEAS - MV MAX PG: 13.4 MMHG
BH CV ECHO MEAS - MV MEAN PG: 2.1 MMHG
BH CV ECHO MEAS - MV P1/2T: 62.3 MSEC
BH CV ECHO MEAS - MV V2 VTI: 46.4 CM
BH CV ECHO MEAS - MVA(P1/2T): 3.5 CM2
BH CV ECHO MEAS - MVA(VTI): 1.65 CM2
BH CV ECHO MEAS - PA ACC TIME: 0.09 SEC
BH CV ECHO MEAS - PA PR(ACCEL): 38.6 MMHG
BH CV ECHO MEAS - PA V2 MAX: 131.7 CM/SEC
BH CV ECHO MEAS - PI END-D VEL: 114 CM/SEC
BH CV ECHO MEAS - PULM DIAS VEL: 46.3 CM/SEC
BH CV ECHO MEAS - PULM S/D: 0.68
BH CV ECHO MEAS - PULM SYS VEL: 31.3 CM/SEC
BH CV ECHO MEAS - QP/QS: 0.87
BH CV ECHO MEAS - RAP SYSTOLE: 3 MMHG
BH CV ECHO MEAS - RV MAX PG: 2.5 MMHG
BH CV ECHO MEAS - RV V1 MAX: 79.3 CM/SEC
BH CV ECHO MEAS - RV V1 VTI: 19.5 CM
BH CV ECHO MEAS - RVOT DIAM: 2.09 CM
BH CV ECHO MEAS - RVSP: 28 MMHG
BH CV ECHO MEAS - SV(LVOT): 76.6 ML
BH CV ECHO MEAS - SV(MOD-SP2): 113 ML
BH CV ECHO MEAS - SV(MOD-SP4): 139 ML
BH CV ECHO MEAS - SV(RVOT): 66.6 ML
BH CV ECHO MEAS - TAPSE (>1.6): 2.8 CM
BH CV ECHO MEAS - TR MAX PG: 25 MMHG
BH CV ECHO MEAS - TR MAX VEL: 249.8 CM/SEC
BH CV ECHO MEASUREMENTS AVERAGE E/E' RATIO: 14.95
BH CV XLRA - RV BASE: 4.3 CM
BH CV XLRA - RV LENGTH: 8.9 CM
BH CV XLRA - RV MID: 4.4 CM
BH CV XLRA - TDI S': 9.7 CM/SEC
BILIRUB SERPL-MCNC: 0.8 MG/DL (ref 0–1.2)
BUN SERPL-MCNC: 18 MG/DL (ref 8–23)
BUN SERPL-MCNC: 21 MG/DL (ref 8–23)
BUN/CREAT SERPL: 16.8 (ref 7–25)
BUN/CREAT SERPL: 23.7 (ref 7–25)
CALCIUM SPEC-SCNC: 8.8 MG/DL (ref 8.6–10.5)
CALCIUM SPEC-SCNC: 9 MG/DL (ref 8.6–10.5)
CHLORIDE SERPL-SCNC: 101 MMOL/L (ref 98–107)
CHLORIDE SERPL-SCNC: 98 MMOL/L (ref 98–107)
CO2 SERPL-SCNC: 26 MMOL/L (ref 22–29)
CO2 SERPL-SCNC: 26.4 MMOL/L (ref 22–29)
CREAT SERPL-MCNC: 0.76 MG/DL (ref 0.76–1.27)
CREAT SERPL-MCNC: 1.25 MG/DL (ref 0.76–1.27)
DACRYOCYTES BLD QL SMEAR: ABNORMAL
DEPRECATED RDW RBC AUTO: 44.1 FL (ref 37–54)
DEPRECATED RDW RBC AUTO: 46.3 FL (ref 37–54)
EGFRCR SERPLBLD CKD-EPI 2021: 61.2 ML/MIN/1.73
EGFRCR SERPLBLD CKD-EPI 2021: 95.5 ML/MIN/1.73
ERYTHROCYTE [DISTWIDTH] IN BLOOD BY AUTOMATED COUNT: 12.9 % (ref 12.3–15.4)
ERYTHROCYTE [DISTWIDTH] IN BLOOD BY AUTOMATED COUNT: 13 % (ref 12.3–15.4)
GEN 5 2HR TROPONIN T REFLEX: 21 NG/L
GLOBULIN UR ELPH-MCNC: 3.3 GM/DL
GLUCOSE SERPL-MCNC: 135 MG/DL (ref 65–99)
GLUCOSE SERPL-MCNC: 141 MG/DL (ref 65–99)
HCT VFR BLD AUTO: 35.8 % (ref 37.5–51)
HCT VFR BLD AUTO: 39.7 % (ref 37.5–51)
HGB BLD-MCNC: 12.2 G/DL (ref 13–17.7)
HGB BLD-MCNC: 12.8 G/DL (ref 13–17.7)
INR PPP: 1.01 (ref 0.9–1.1)
INR PPP: 1.08 (ref 0.9–1.1)
LEFT ATRIUM VOLUME INDEX: 26.5 ML/M2
LYMPHOCYTES # BLD MANUAL: 1.31 10*3/MM3 (ref 0.7–3.1)
LYMPHOCYTES # BLD MANUAL: 1.36 10*3/MM3 (ref 0.7–3.1)
LYMPHOCYTES NFR BLD MANUAL: 1 % (ref 5–12)
LYMPHOCYTES NFR BLD MANUAL: 10.1 % (ref 5–12)
MAGNESIUM SERPL-MCNC: 2 MG/DL (ref 1.6–2.4)
MAGNESIUM SERPL-MCNC: 2.1 MG/DL (ref 1.6–2.4)
MAXIMAL PREDICTED HEART RATE: 148 BPM
MCH RBC QN AUTO: 31.4 PG (ref 26.6–33)
MCH RBC QN AUTO: 31.9 PG (ref 26.6–33)
MCHC RBC AUTO-ENTMCNC: 32.2 G/DL (ref 31.5–35.7)
MCHC RBC AUTO-ENTMCNC: 34.1 G/DL (ref 31.5–35.7)
MCV RBC AUTO: 93.5 FL (ref 79–97)
MCV RBC AUTO: 97.3 FL (ref 79–97)
MONOCYTES # BLD: 0.05 10*3/MM3 (ref 0.1–0.9)
MONOCYTES # BLD: 0.65 10*3/MM3 (ref 0.1–0.9)
NEUTROPHILS # BLD AUTO: 4.1 10*3/MM3 (ref 1.7–7)
NEUTROPHILS # BLD AUTO: 4.35 10*3/MM3 (ref 1.7–7)
NEUTROPHILS NFR BLD MANUAL: 67.7 % (ref 42.7–76)
NEUTROPHILS NFR BLD MANUAL: 75 % (ref 42.7–76)
NT-PROBNP SERPL-MCNC: 1113 PG/ML (ref 0–900)
PLAT MORPH BLD: NORMAL
PLATELET # BLD AUTO: 205 10*3/MM3 (ref 140–450)
PLATELET # BLD AUTO: 213 10*3/MM3 (ref 140–450)
PMV BLD AUTO: 10.3 FL (ref 6–12)
PMV BLD AUTO: 10.6 FL (ref 6–12)
POTASSIUM SERPL-SCNC: 3.9 MMOL/L (ref 3.5–5.2)
POTASSIUM SERPL-SCNC: 4.4 MMOL/L (ref 3.5–5.2)
PROCALCITONIN SERPL-MCNC: 0.06 NG/ML (ref 0–0.25)
PROT SERPL-MCNC: 7 G/DL (ref 6–8.5)
PROTHROMBIN TIME: 13.4 SECONDS (ref 11.7–14.2)
PROTHROMBIN TIME: 14.2 SECONDS (ref 11.7–14.2)
QT INTERVAL: 449 MS
RBC # BLD AUTO: 3.83 10*6/MM3 (ref 4.14–5.8)
RBC # BLD AUTO: 4.08 10*6/MM3 (ref 4.14–5.8)
RBC MORPH BLD: NORMAL
SINUS: 3.3 CM
SMALL PLATELETS BLD QL SMEAR: ADEQUATE
SODIUM SERPL-SCNC: 131 MMOL/L (ref 136–145)
SODIUM SERPL-SCNC: 137 MMOL/L (ref 136–145)
STJ: 3.2 CM
STRESS TARGET HR: 126 BPM
TROPONIN T DELTA: -7 NG/L
TROPONIN T SERPL HS-MCNC: 28 NG/L
VARIANT LYMPHS NFR BLD MANUAL: 21.2 % (ref 19.6–45.3)
VARIANT LYMPHS NFR BLD MANUAL: 24 % (ref 19.6–45.3)
WBC MORPH BLD: NORMAL
WBC MORPH BLD: NORMAL
WBC NRBC COR # BLD: 5.47 10*3/MM3 (ref 3.4–10.8)
WBC NRBC COR # BLD: 6.43 10*3/MM3 (ref 3.4–10.8)

## 2023-02-24 PROCEDURE — C1894 INTRO/SHEATH, NON-LASER: HCPCS | Performed by: INTERNAL MEDICINE

## 2023-02-24 PROCEDURE — 84484 ASSAY OF TROPONIN QUANT: CPT | Performed by: PHYSICIAN ASSISTANT

## 2023-02-24 PROCEDURE — 82810 BLOOD GASES O2 SAT ONLY: CPT | Performed by: INTERNAL MEDICINE

## 2023-02-24 PROCEDURE — 93306 TTE W/DOPPLER COMPLETE: CPT | Performed by: INTERNAL MEDICINE

## 2023-02-24 PROCEDURE — 94799 UNLISTED PULMONARY SVC/PX: CPT

## 2023-02-24 PROCEDURE — 83735 ASSAY OF MAGNESIUM: CPT | Performed by: PHYSICIAN ASSISTANT

## 2023-02-24 PROCEDURE — 94761 N-INVAS EAR/PLS OXIMETRY MLT: CPT

## 2023-02-24 PROCEDURE — 94640 AIRWAY INHALATION TREATMENT: CPT

## 2023-02-24 PROCEDURE — 99285 EMERGENCY DEPT VISIT HI MDM: CPT

## 2023-02-24 PROCEDURE — 85018 HEMOGLOBIN: CPT | Performed by: INTERNAL MEDICINE

## 2023-02-24 PROCEDURE — 63710000001 PREDNISONE PER 1 MG: Performed by: INTERNAL MEDICINE

## 2023-02-24 PROCEDURE — 25010000002 ENOXAPARIN PER 10 MG: Performed by: INTERNAL MEDICINE

## 2023-02-24 PROCEDURE — 94010 BREATHING CAPACITY TEST: CPT

## 2023-02-24 PROCEDURE — 36415 COLL VENOUS BLD VENIPUNCTURE: CPT | Performed by: NURSE PRACTITIONER

## 2023-02-24 PROCEDURE — 4A023N6 MEASUREMENT OF CARDIAC SAMPLING AND PRESSURE, RIGHT HEART, PERCUTANEOUS APPROACH: ICD-10-PCS | Performed by: INTERNAL MEDICINE

## 2023-02-24 PROCEDURE — 93005 ELECTROCARDIOGRAM TRACING: CPT

## 2023-02-24 PROCEDURE — 85007 BL SMEAR W/DIFF WBC COUNT: CPT | Performed by: NURSE PRACTITIONER

## 2023-02-24 PROCEDURE — 71045 X-RAY EXAM CHEST 1 VIEW: CPT

## 2023-02-24 PROCEDURE — 80053 COMPREHEN METABOLIC PANEL: CPT | Performed by: PHYSICIAN ASSISTANT

## 2023-02-24 PROCEDURE — 25010000002 ENOXAPARIN PER 10 MG: Performed by: NURSE PRACTITIONER

## 2023-02-24 PROCEDURE — 93451 RIGHT HEART CATH: CPT | Performed by: INTERNAL MEDICINE

## 2023-02-24 PROCEDURE — 85610 PROTHROMBIN TIME: CPT | Performed by: PHYSICIAN ASSISTANT

## 2023-02-24 PROCEDURE — 25510000001 PERFLUTREN (DEFINITY) 8.476 MG IN SODIUM CHLORIDE (PF) 0.9 % 10 ML INJECTION: Performed by: NURSE PRACTITIONER

## 2023-02-24 PROCEDURE — 85014 HEMATOCRIT: CPT | Performed by: INTERNAL MEDICINE

## 2023-02-24 PROCEDURE — 99222 1ST HOSP IP/OBS MODERATE 55: CPT | Performed by: INTERNAL MEDICINE

## 2023-02-24 PROCEDURE — 83735 ASSAY OF MAGNESIUM: CPT | Performed by: NURSE PRACTITIONER

## 2023-02-24 PROCEDURE — 94664 DEMO&/EVAL PT USE INHALER: CPT

## 2023-02-24 PROCEDURE — 85025 COMPLETE CBC W/AUTO DIFF WBC: CPT | Performed by: PHYSICIAN ASSISTANT

## 2023-02-24 PROCEDURE — 84145 PROCALCITONIN (PCT): CPT | Performed by: PHYSICIAN ASSISTANT

## 2023-02-24 PROCEDURE — 93306 TTE W/DOPPLER COMPLETE: CPT

## 2023-02-24 PROCEDURE — 85007 BL SMEAR W/DIFF WBC COUNT: CPT | Performed by: PHYSICIAN ASSISTANT

## 2023-02-24 PROCEDURE — 93005 ELECTROCARDIOGRAM TRACING: CPT | Performed by: EMERGENCY MEDICINE

## 2023-02-24 PROCEDURE — 93010 ELECTROCARDIOGRAM REPORT: CPT | Performed by: INTERNAL MEDICINE

## 2023-02-24 PROCEDURE — 85025 COMPLETE CBC W/AUTO DIFF WBC: CPT | Performed by: NURSE PRACTITIONER

## 2023-02-24 PROCEDURE — 85610 PROTHROMBIN TIME: CPT | Performed by: NURSE PRACTITIONER

## 2023-02-24 PROCEDURE — 83880 ASSAY OF NATRIURETIC PEPTIDE: CPT | Performed by: PHYSICIAN ASSISTANT

## 2023-02-24 PROCEDURE — 63710000001 AZATHIOPRINE PER 50 MG: Performed by: INTERNAL MEDICINE

## 2023-02-24 RX ORDER — SODIUM CHLORIDE 0.9 % (FLUSH) 0.9 %
10 SYRINGE (ML) INJECTION AS NEEDED
Status: DISCONTINUED | OUTPATIENT
Start: 2023-02-24 | End: 2023-02-28 | Stop reason: HOSPADM

## 2023-02-24 RX ORDER — BUDESONIDE AND FORMOTEROL FUMARATE DIHYDRATE 160; 4.5 UG/1; UG/1
2 AEROSOL RESPIRATORY (INHALATION)
Status: DISCONTINUED | OUTPATIENT
Start: 2023-02-24 | End: 2023-02-28 | Stop reason: HOSPADM

## 2023-02-24 RX ORDER — ACETAMINOPHEN 650 MG/1
650 SUPPOSITORY RECTAL EVERY 4 HOURS PRN
Status: DISCONTINUED | OUTPATIENT
Start: 2023-02-24 | End: 2023-02-28 | Stop reason: HOSPADM

## 2023-02-24 RX ORDER — ENOXAPARIN SODIUM 100 MG/ML
1 INJECTION SUBCUTANEOUS EVERY 12 HOURS
Status: DISCONTINUED | OUTPATIENT
Start: 2023-02-24 | End: 2023-02-26

## 2023-02-24 RX ORDER — NADOLOL 80 MG/1
80 TABLET ORAL DAILY
Status: DISCONTINUED | OUTPATIENT
Start: 2023-02-24 | End: 2023-02-27

## 2023-02-24 RX ORDER — IPRATROPIUM BROMIDE AND ALBUTEROL SULFATE 2.5; .5 MG/3ML; MG/3ML
3 SOLUTION RESPIRATORY (INHALATION) ONCE
Status: COMPLETED | OUTPATIENT
Start: 2023-02-24 | End: 2023-02-24

## 2023-02-24 RX ORDER — ONDANSETRON 2 MG/ML
4 INJECTION INTRAMUSCULAR; INTRAVENOUS EVERY 6 HOURS PRN
Status: DISCONTINUED | OUTPATIENT
Start: 2023-02-24 | End: 2023-02-24 | Stop reason: SDUPTHER

## 2023-02-24 RX ORDER — PREDNISONE 20 MG/1
40 TABLET ORAL DAILY
Status: DISCONTINUED | OUTPATIENT
Start: 2023-02-24 | End: 2023-02-28 | Stop reason: HOSPADM

## 2023-02-24 RX ORDER — NITROGLYCERIN 0.4 MG/1
0.4 TABLET SUBLINGUAL
Status: DISCONTINUED | OUTPATIENT
Start: 2023-02-24 | End: 2023-02-28 | Stop reason: HOSPADM

## 2023-02-24 RX ORDER — ONDANSETRON 2 MG/ML
4 INJECTION INTRAMUSCULAR; INTRAVENOUS EVERY 6 HOURS PRN
Status: DISCONTINUED | OUTPATIENT
Start: 2023-02-24 | End: 2023-02-28 | Stop reason: HOSPADM

## 2023-02-24 RX ORDER — SODIUM CHLORIDE 0.9 % (FLUSH) 0.9 %
10 SYRINGE (ML) INJECTION EVERY 12 HOURS SCHEDULED
Status: DISCONTINUED | OUTPATIENT
Start: 2023-02-24 | End: 2023-02-28 | Stop reason: HOSPADM

## 2023-02-24 RX ORDER — MONTELUKAST SODIUM 10 MG/1
10 TABLET ORAL NIGHTLY
Status: DISCONTINUED | OUTPATIENT
Start: 2023-02-24 | End: 2023-02-28 | Stop reason: HOSPADM

## 2023-02-24 RX ORDER — AZATHIOPRINE 50 MG/1
50 TABLET ORAL DAILY
Status: DISCONTINUED | OUTPATIENT
Start: 2023-02-24 | End: 2023-02-28 | Stop reason: HOSPADM

## 2023-02-24 RX ORDER — IPRATROPIUM BROMIDE AND ALBUTEROL SULFATE 2.5; .5 MG/3ML; MG/3ML
3 SOLUTION RESPIRATORY (INHALATION)
Status: DISCONTINUED | OUTPATIENT
Start: 2023-02-24 | End: 2023-02-28 | Stop reason: HOSPADM

## 2023-02-24 RX ORDER — SODIUM CHLORIDE 9 MG/ML
40 INJECTION, SOLUTION INTRAVENOUS AS NEEDED
Status: DISCONTINUED | OUTPATIENT
Start: 2023-02-24 | End: 2023-02-28 | Stop reason: HOSPADM

## 2023-02-24 RX ORDER — LIDOCAINE HYDROCHLORIDE 20 MG/ML
INJECTION, SOLUTION INFILTRATION; PERINEURAL
Status: DISCONTINUED | OUTPATIENT
Start: 2023-02-24 | End: 2023-02-24 | Stop reason: HOSPADM

## 2023-02-24 RX ORDER — METHOCARBAMOL 500 MG/1
500 TABLET, FILM COATED ORAL EVERY 8 HOURS PRN
Status: DISCONTINUED | OUTPATIENT
Start: 2023-02-24 | End: 2023-02-28 | Stop reason: HOSPADM

## 2023-02-24 RX ORDER — GUAIFENESIN 600 MG/1
600 TABLET, EXTENDED RELEASE ORAL EVERY 12 HOURS SCHEDULED
Status: DISCONTINUED | OUTPATIENT
Start: 2023-02-24 | End: 2023-02-28 | Stop reason: HOSPADM

## 2023-02-24 RX ORDER — ONDANSETRON 4 MG/1
4 TABLET, FILM COATED ORAL EVERY 6 HOURS PRN
Status: DISCONTINUED | OUTPATIENT
Start: 2023-02-24 | End: 2023-02-28 | Stop reason: HOSPADM

## 2023-02-24 RX ORDER — ACETAMINOPHEN 160 MG/5ML
650 SOLUTION ORAL EVERY 4 HOURS PRN
Status: DISCONTINUED | OUTPATIENT
Start: 2023-02-24 | End: 2023-02-28 | Stop reason: HOSPADM

## 2023-02-24 RX ORDER — ACETAMINOPHEN 325 MG/1
650 TABLET ORAL EVERY 4 HOURS PRN
Status: DISCONTINUED | OUTPATIENT
Start: 2023-02-24 | End: 2023-02-28 | Stop reason: HOSPADM

## 2023-02-24 RX ORDER — TRAZODONE HYDROCHLORIDE 100 MG/1
100 TABLET ORAL DAILY
Status: DISCONTINUED | OUTPATIENT
Start: 2023-02-24 | End: 2023-02-28 | Stop reason: HOSPADM

## 2023-02-24 RX ORDER — ROSUVASTATIN CALCIUM 10 MG/1
10 TABLET, COATED ORAL DAILY
Status: DISCONTINUED | OUTPATIENT
Start: 2023-02-24 | End: 2023-02-28 | Stop reason: HOSPADM

## 2023-02-24 RX ORDER — ALLOPURINOL 100 MG/1
100 TABLET ORAL DAILY
Status: DISCONTINUED | OUTPATIENT
Start: 2023-02-24 | End: 2023-02-28 | Stop reason: HOSPADM

## 2023-02-24 RX ADMIN — PREDNISONE 40 MG: 20 TABLET ORAL at 16:34

## 2023-02-24 RX ADMIN — IPRATROPIUM BROMIDE AND ALBUTEROL SULFATE 3 ML: 2.5; .5 SOLUTION RESPIRATORY (INHALATION) at 01:26

## 2023-02-24 RX ADMIN — PERFLUTREN 2 ML: 6.52 INJECTION, SUSPENSION INTRAVENOUS at 07:39

## 2023-02-24 RX ADMIN — IPRATROPIUM BROMIDE AND ALBUTEROL SULFATE 3 ML: 2.5; .5 SOLUTION RESPIRATORY (INHALATION) at 10:47

## 2023-02-24 RX ADMIN — GUAIFENESIN 600 MG: 600 TABLET, EXTENDED RELEASE ORAL at 20:26

## 2023-02-24 RX ADMIN — AZATHIOPRINE 50 MG: 50 TABLET ORAL at 16:34

## 2023-02-24 RX ADMIN — IPRATROPIUM BROMIDE AND ALBUTEROL SULFATE 3 ML: 2.5; .5 SOLUTION RESPIRATORY (INHALATION) at 16:19

## 2023-02-24 RX ADMIN — ENOXAPARIN SODIUM 100 MG: 100 INJECTION SUBCUTANEOUS at 05:45

## 2023-02-24 RX ADMIN — MONTELUKAST SODIUM 10 MG: 10 TABLET, FILM COATED ORAL at 20:26

## 2023-02-24 RX ADMIN — ENOXAPARIN SODIUM 100 MG: 100 INJECTION SUBCUTANEOUS at 17:41

## 2023-02-24 RX ADMIN — BUDESONIDE AND FORMOTEROL FUMARATE DIHYDRATE 2 PUFF: 160; 4.5 AEROSOL RESPIRATORY (INHALATION) at 21:00

## 2023-02-25 ENCOUNTER — APPOINTMENT (OUTPATIENT)
Dept: GENERAL RADIOLOGY | Facility: HOSPITAL | Age: 73
DRG: 167 | End: 2023-02-25
Payer: MEDICARE

## 2023-02-25 LAB
ANION GAP SERPL CALCULATED.3IONS-SCNC: 6.7 MMOL/L (ref 5–15)
ARTERIAL PATENCY WRIST A: ABNORMAL
ATMOSPHERIC PRESS: 757.4 MMHG
BASE EXCESS BLDA CALC-SCNC: 2.2 MMOL/L (ref 0–2)
BDY SITE: ABNORMAL
BUN SERPL-MCNC: 18 MG/DL (ref 8–23)
BUN/CREAT SERPL: 19.1 (ref 7–25)
CALCIUM SPEC-SCNC: 9 MG/DL (ref 8.6–10.5)
CHLORIDE SERPL-SCNC: 100 MMOL/L (ref 98–107)
CO2 SERPL-SCNC: 26.3 MMOL/L (ref 22–29)
CREAT SERPL-MCNC: 0.94 MG/DL (ref 0.76–1.27)
DEPRECATED RDW RBC AUTO: 45.2 FL (ref 37–54)
EGFRCR SERPLBLD CKD-EPI 2021: 86.1 ML/MIN/1.73
ERYTHROCYTE [DISTWIDTH] IN BLOOD BY AUTOMATED COUNT: 13 % (ref 12.3–15.4)
GLUCOSE SERPL-MCNC: 119 MG/DL (ref 65–99)
HCO3 BLDA-SCNC: 27.4 MMOL/L (ref 22–28)
HCT VFR BLD AUTO: 40.9 % (ref 37.5–51)
HGB BLD-MCNC: 13.1 G/DL (ref 13–17.7)
MCH RBC QN AUTO: 30.5 PG (ref 26.6–33)
MCHC RBC AUTO-ENTMCNC: 32 G/DL (ref 31.5–35.7)
MCV RBC AUTO: 95.1 FL (ref 79–97)
MODALITY: ABNORMAL
PCO2 BLDA: 43.9 MM HG (ref 35–45)
PH BLDA: 7.4 PH UNITS (ref 7.35–7.45)
PLATELET # BLD AUTO: 249 10*3/MM3 (ref 140–450)
PMV BLD AUTO: 10.8 FL (ref 6–12)
PO2 BLDA: 62.3 MM HG (ref 80–100)
POTASSIUM SERPL-SCNC: 4 MMOL/L (ref 3.5–5.2)
RBC # BLD AUTO: 4.3 10*6/MM3 (ref 4.14–5.8)
SAO2 % BLDCOA: 91.4 % (ref 92–99)
SODIUM SERPL-SCNC: 133 MMOL/L (ref 136–145)
TOTAL RATE: 14 BREATHS/MINUTE
WBC NRBC COR # BLD: 6.9 10*3/MM3 (ref 3.4–10.8)

## 2023-02-25 PROCEDURE — 94664 DEMO&/EVAL PT USE INHALER: CPT

## 2023-02-25 PROCEDURE — 82803 BLOOD GASES ANY COMBINATION: CPT

## 2023-02-25 PROCEDURE — 94761 N-INVAS EAR/PLS OXIMETRY MLT: CPT

## 2023-02-25 PROCEDURE — 71046 X-RAY EXAM CHEST 2 VIEWS: CPT

## 2023-02-25 PROCEDURE — 94799 UNLISTED PULMONARY SVC/PX: CPT

## 2023-02-25 PROCEDURE — 80048 BASIC METABOLIC PNL TOTAL CA: CPT | Performed by: INTERNAL MEDICINE

## 2023-02-25 PROCEDURE — 25010000002 ENOXAPARIN PER 10 MG: Performed by: INTERNAL MEDICINE

## 2023-02-25 PROCEDURE — 36600 WITHDRAWAL OF ARTERIAL BLOOD: CPT

## 2023-02-25 PROCEDURE — 63710000001 PREDNISONE PER 1 MG: Performed by: INTERNAL MEDICINE

## 2023-02-25 PROCEDURE — 94760 N-INVAS EAR/PLS OXIMETRY 1: CPT

## 2023-02-25 PROCEDURE — 63710000001 AZATHIOPRINE PER 50 MG: Performed by: INTERNAL MEDICINE

## 2023-02-25 PROCEDURE — 99232 SBSQ HOSP IP/OBS MODERATE 35: CPT | Performed by: INTERNAL MEDICINE

## 2023-02-25 PROCEDURE — 85027 COMPLETE CBC AUTOMATED: CPT | Performed by: INTERNAL MEDICINE

## 2023-02-25 RX ORDER — FUROSEMIDE 20 MG/1
20 TABLET ORAL DAILY
Status: DISCONTINUED | OUTPATIENT
Start: 2023-02-26 | End: 2023-02-28 | Stop reason: HOSPADM

## 2023-02-25 RX ADMIN — BUDESONIDE AND FORMOTEROL FUMARATE DIHYDRATE 2 PUFF: 160; 4.5 AEROSOL RESPIRATORY (INHALATION) at 20:50

## 2023-02-25 RX ADMIN — ENOXAPARIN SODIUM 100 MG: 100 INJECTION SUBCUTANEOUS at 06:44

## 2023-02-25 RX ADMIN — IPRATROPIUM BROMIDE AND ALBUTEROL SULFATE 3 ML: 2.5; .5 SOLUTION RESPIRATORY (INHALATION) at 15:23

## 2023-02-25 RX ADMIN — TRAZODONE HYDROCHLORIDE 100 MG: 100 TABLET ORAL at 23:21

## 2023-02-25 RX ADMIN — MONTELUKAST SODIUM 10 MG: 10 TABLET, FILM COATED ORAL at 20:57

## 2023-02-25 RX ADMIN — PREDNISONE 40 MG: 20 TABLET ORAL at 08:55

## 2023-02-25 RX ADMIN — ROSUVASTATIN CALCIUM 10 MG: 10 TABLET, FILM COATED ORAL at 08:55

## 2023-02-25 RX ADMIN — Medication 10 ML: at 08:58

## 2023-02-25 RX ADMIN — ALLOPURINOL 100 MG: 100 TABLET ORAL at 08:54

## 2023-02-25 RX ADMIN — IPRATROPIUM BROMIDE AND ALBUTEROL SULFATE 3 ML: 2.5; .5 SOLUTION RESPIRATORY (INHALATION) at 11:14

## 2023-02-25 RX ADMIN — GUAIFENESIN 600 MG: 600 TABLET, EXTENDED RELEASE ORAL at 08:55

## 2023-02-25 RX ADMIN — BUDESONIDE AND FORMOTEROL FUMARATE DIHYDRATE 2 PUFF: 160; 4.5 AEROSOL RESPIRATORY (INHALATION) at 06:56

## 2023-02-25 RX ADMIN — ENOXAPARIN SODIUM 100 MG: 100 INJECTION SUBCUTANEOUS at 15:55

## 2023-02-25 RX ADMIN — TRAZODONE HYDROCHLORIDE 100 MG: 100 TABLET ORAL at 08:55

## 2023-02-25 RX ADMIN — GUAIFENESIN 600 MG: 600 TABLET, EXTENDED RELEASE ORAL at 20:57

## 2023-02-25 RX ADMIN — AZATHIOPRINE 50 MG: 50 TABLET ORAL at 08:55

## 2023-02-26 LAB
ANION GAP SERPL CALCULATED.3IONS-SCNC: 7 MMOL/L (ref 5–15)
BUN SERPL-MCNC: 14 MG/DL (ref 8–23)
BUN/CREAT SERPL: 16.7 (ref 7–25)
CALCIUM SPEC-SCNC: 8.5 MG/DL (ref 8.6–10.5)
CHLORIDE SERPL-SCNC: 103 MMOL/L (ref 98–107)
CO2 SERPL-SCNC: 26 MMOL/L (ref 22–29)
CREAT SERPL-MCNC: 0.84 MG/DL (ref 0.76–1.27)
DEPRECATED RDW RBC AUTO: 45.3 FL (ref 37–54)
EGFRCR SERPLBLD CKD-EPI 2021: 92.7 ML/MIN/1.73
EOSINOPHIL # BLD MANUAL: 0.09 10*3/MM3 (ref 0–0.4)
EOSINOPHIL NFR BLD MANUAL: 1.1 % (ref 0.3–6.2)
ERYTHROCYTE [DISTWIDTH] IN BLOOD BY AUTOMATED COUNT: 13.1 % (ref 12.3–15.4)
GLUCOSE SERPL-MCNC: 116 MG/DL (ref 65–99)
HCT VFR BLD AUTO: 36.7 % (ref 37.5–51)
HGB BLD-MCNC: 12.3 G/DL (ref 13–17.7)
LYMPHOCYTES # BLD MANUAL: 2.8 10*3/MM3 (ref 0.7–3.1)
LYMPHOCYTES NFR BLD MANUAL: 3.2 % (ref 5–12)
MCH RBC QN AUTO: 31.9 PG (ref 26.6–33)
MCHC RBC AUTO-ENTMCNC: 33.5 G/DL (ref 31.5–35.7)
MCV RBC AUTO: 95.1 FL (ref 79–97)
MONOCYTES # BLD: 0.26 10*3/MM3 (ref 0.1–0.9)
NEUTROPHILS # BLD AUTO: 4.94 10*3/MM3 (ref 1.7–7)
NEUTROPHILS NFR BLD MANUAL: 61.1 % (ref 42.7–76)
PLAT MORPH BLD: NORMAL
PLATELET # BLD AUTO: 221 10*3/MM3 (ref 140–450)
PMV BLD AUTO: 10.7 FL (ref 6–12)
POTASSIUM SERPL-SCNC: 3.7 MMOL/L (ref 3.5–5.2)
RBC # BLD AUTO: 3.86 10*6/MM3 (ref 4.14–5.8)
RBC MORPH BLD: NORMAL
SMUDGE CELLS BLD QL SMEAR: ABNORMAL
SODIUM SERPL-SCNC: 136 MMOL/L (ref 136–145)
VARIANT LYMPHS NFR BLD MANUAL: 2.1 % (ref 0–5)
VARIANT LYMPHS NFR BLD MANUAL: 32.6 % (ref 19.6–45.3)
WBC NRBC COR # BLD: 8.08 10*3/MM3 (ref 3.4–10.8)

## 2023-02-26 PROCEDURE — 80048 BASIC METABOLIC PNL TOTAL CA: CPT | Performed by: INTERNAL MEDICINE

## 2023-02-26 PROCEDURE — 25010000002 ENOXAPARIN PER 10 MG: Performed by: INTERNAL MEDICINE

## 2023-02-26 PROCEDURE — 85025 COMPLETE CBC W/AUTO DIFF WBC: CPT | Performed by: INTERNAL MEDICINE

## 2023-02-26 PROCEDURE — 63710000001 PREDNISONE PER 1 MG: Performed by: INTERNAL MEDICINE

## 2023-02-26 PROCEDURE — 85007 BL SMEAR W/DIFF WBC COUNT: CPT | Performed by: INTERNAL MEDICINE

## 2023-02-26 PROCEDURE — 63710000001 AZATHIOPRINE PER 50 MG: Performed by: INTERNAL MEDICINE

## 2023-02-26 PROCEDURE — 94799 UNLISTED PULMONARY SVC/PX: CPT

## 2023-02-26 PROCEDURE — 94664 DEMO&/EVAL PT USE INHALER: CPT

## 2023-02-26 RX ORDER — ENOXAPARIN SODIUM 100 MG/ML
100 INJECTION SUBCUTANEOUS EVERY 12 HOURS
Status: DISCONTINUED | OUTPATIENT
Start: 2023-02-27 | End: 2023-02-28

## 2023-02-26 RX ADMIN — PREDNISONE 40 MG: 20 TABLET ORAL at 09:16

## 2023-02-26 RX ADMIN — GUAIFENESIN 600 MG: 600 TABLET, EXTENDED RELEASE ORAL at 09:16

## 2023-02-26 RX ADMIN — BUDESONIDE AND FORMOTEROL FUMARATE DIHYDRATE 2 PUFF: 160; 4.5 AEROSOL RESPIRATORY (INHALATION) at 20:21

## 2023-02-26 RX ADMIN — ENOXAPARIN SODIUM 100 MG: 100 INJECTION SUBCUTANEOUS at 04:57

## 2023-02-26 RX ADMIN — GUAIFENESIN 600 MG: 600 TABLET, EXTENDED RELEASE ORAL at 20:42

## 2023-02-26 RX ADMIN — ROSUVASTATIN CALCIUM 10 MG: 10 TABLET, FILM COATED ORAL at 09:16

## 2023-02-26 RX ADMIN — IPRATROPIUM BROMIDE AND ALBUTEROL SULFATE 3 ML: 2.5; .5 SOLUTION RESPIRATORY (INHALATION) at 15:30

## 2023-02-26 RX ADMIN — ALLOPURINOL 100 MG: 100 TABLET ORAL at 09:15

## 2023-02-26 RX ADMIN — AZATHIOPRINE 50 MG: 50 TABLET ORAL at 09:16

## 2023-02-26 RX ADMIN — Medication 10 ML: at 09:23

## 2023-02-26 RX ADMIN — MONTELUKAST SODIUM 10 MG: 10 TABLET, FILM COATED ORAL at 20:42

## 2023-02-26 RX ADMIN — Medication 10 ML: at 20:42

## 2023-02-26 RX ADMIN — FUROSEMIDE 20 MG: 20 TABLET ORAL at 09:16

## 2023-02-27 ENCOUNTER — APPOINTMENT (OUTPATIENT)
Dept: OTHER | Facility: HOSPITAL | Age: 73
DRG: 167 | End: 2023-02-27
Payer: MEDICARE

## 2023-02-27 ENCOUNTER — APPOINTMENT (OUTPATIENT)
Dept: GENERAL RADIOLOGY | Facility: HOSPITAL | Age: 73
DRG: 167 | End: 2023-02-27
Payer: MEDICARE

## 2023-02-27 ENCOUNTER — ANESTHESIA EVENT (OUTPATIENT)
Dept: GASTROENTEROLOGY | Facility: HOSPITAL | Age: 73
DRG: 167 | End: 2023-02-27
Payer: MEDICARE

## 2023-02-27 ENCOUNTER — ANESTHESIA (OUTPATIENT)
Dept: GASTROENTEROLOGY | Facility: HOSPITAL | Age: 73
DRG: 167 | End: 2023-02-27
Payer: MEDICARE

## 2023-02-27 LAB
ANION GAP SERPL CALCULATED.3IONS-SCNC: 3.7 MMOL/L (ref 5–15)
APPEARANCE FLD: ABNORMAL
BASOPHILS # BLD AUTO: 0.05 10*3/MM3 (ref 0–0.2)
BASOPHILS NFR BLD AUTO: 0.6 % (ref 0–1.5)
BUN SERPL-MCNC: 17 MG/DL (ref 8–23)
BUN/CREAT SERPL: 22.1 (ref 7–25)
CALCIUM SPEC-SCNC: 8.5 MG/DL (ref 8.6–10.5)
CHLORIDE SERPL-SCNC: 105 MMOL/L (ref 98–107)
CO2 SERPL-SCNC: 28.3 MMOL/L (ref 22–29)
COLOR FLD: COLORLESS
CREAT SERPL-MCNC: 0.77 MG/DL (ref 0.76–1.27)
DEPRECATED RDW RBC AUTO: 46.3 FL (ref 37–54)
EGFRCR SERPLBLD CKD-EPI 2021: 95.1 ML/MIN/1.73
EOSINOPHIL # BLD AUTO: 0.33 10*3/MM3 (ref 0–0.4)
EOSINOPHIL NFR BLD AUTO: 3.6 % (ref 0.3–6.2)
EOSINOPHIL NFR FLD MANUAL: 19 %
ERYTHROCYTE [DISTWIDTH] IN BLOOD BY AUTOMATED COUNT: 13.1 % (ref 12.3–15.4)
GLUCOSE SERPL-MCNC: 106 MG/DL (ref 65–99)
HCT VFR BLD AUTO: 37.5 % (ref 37.5–51)
HGB BLD-MCNC: 12.2 G/DL (ref 13–17.7)
IMM GRANULOCYTES # BLD AUTO: 0.03 10*3/MM3 (ref 0–0.05)
IMM GRANULOCYTES NFR BLD AUTO: 0.3 % (ref 0–0.5)
LYMPHOCYTES # BLD AUTO: 3.22 10*3/MM3 (ref 0.7–3.1)
LYMPHOCYTES NFR BLD AUTO: 35.5 % (ref 19.6–45.3)
LYMPHOCYTES NFR FLD MANUAL: 4 %
MCH RBC QN AUTO: 31.1 PG (ref 26.6–33)
MCHC RBC AUTO-ENTMCNC: 32.5 G/DL (ref 31.5–35.7)
MCV RBC AUTO: 95.7 FL (ref 79–97)
METHOD: ABNORMAL
MONOCYTES # BLD AUTO: 0.8 10*3/MM3 (ref 0.1–0.9)
MONOCYTES NFR BLD AUTO: 8.8 % (ref 5–12)
MONOCYTES NFR FLD: 2 %
MONOS+MACROS NFR FLD: 52 %
NEUTROPHILS NFR BLD AUTO: 4.63 10*3/MM3 (ref 1.7–7)
NEUTROPHILS NFR BLD AUTO: 51.2 % (ref 42.7–76)
NEUTROPHILS NFR FLD MANUAL: 23 %
NRBC BLD AUTO-RTO: 0.1 /100 WBC (ref 0–0.2)
NUC CELL # FLD: 655 /MM3
PLATELET # BLD AUTO: 213 10*3/MM3 (ref 140–450)
PMV BLD AUTO: 10.7 FL (ref 6–12)
POTASSIUM SERPL-SCNC: 4.1 MMOL/L (ref 3.5–5.2)
RBC # BLD AUTO: 3.92 10*6/MM3 (ref 4.14–5.8)
RBC # FLD AUTO: 460 /MM3
SODIUM SERPL-SCNC: 137 MMOL/L (ref 136–145)
WBC NRBC COR # BLD: 9.06 10*3/MM3 (ref 3.4–10.8)

## 2023-02-27 PROCEDURE — 07D78ZX EXTRACTION OF THORAX LYMPHATIC, VIA NATURAL OR ARTIFICIAL OPENING ENDOSCOPIC, DIAGNOSTIC: ICD-10-PCS | Performed by: INTERNAL MEDICINE

## 2023-02-27 PROCEDURE — 63710000001 AZATHIOPRINE PER 50 MG: Performed by: INTERNAL MEDICINE

## 2023-02-27 PROCEDURE — 71045 X-RAY EXAM CHEST 1 VIEW: CPT

## 2023-02-27 PROCEDURE — 76000 FLUOROSCOPY <1 HR PHYS/QHP: CPT

## 2023-02-27 PROCEDURE — 87206 SMEAR FLUORESCENT/ACID STAI: CPT | Performed by: INTERNAL MEDICINE

## 2023-02-27 PROCEDURE — 0B9D8ZX DRAINAGE OF RIGHT MIDDLE LUNG LOBE, VIA NATURAL OR ARTIFICIAL OPENING ENDOSCOPIC, DIAGNOSTIC: ICD-10-PCS | Performed by: INTERNAL MEDICINE

## 2023-02-27 PROCEDURE — 25010000002 PROPOFOL 10 MG/ML EMULSION: Performed by: NURSE ANESTHETIST, CERTIFIED REGISTERED

## 2023-02-27 PROCEDURE — 87205 SMEAR GRAM STAIN: CPT | Performed by: INTERNAL MEDICINE

## 2023-02-27 PROCEDURE — 94664 DEMO&/EVAL PT USE INHALER: CPT

## 2023-02-27 PROCEDURE — 89051 BODY FLUID CELL COUNT: CPT | Performed by: INTERNAL MEDICINE

## 2023-02-27 PROCEDURE — 88173 CYTOPATH EVAL FNA REPORT: CPT | Performed by: INTERNAL MEDICINE

## 2023-02-27 PROCEDURE — 88341 IMHCHEM/IMCYTCHM EA ADD ANTB: CPT | Performed by: INTERNAL MEDICINE

## 2023-02-27 PROCEDURE — 88360 TUMOR IMMUNOHISTOCHEM/MANUAL: CPT | Performed by: INTERNAL MEDICINE

## 2023-02-27 PROCEDURE — 87116 MYCOBACTERIA CULTURE: CPT | Performed by: INTERNAL MEDICINE

## 2023-02-27 PROCEDURE — 88342 IMHCHEM/IMCYTCHM 1ST ANTB: CPT | Performed by: INTERNAL MEDICINE

## 2023-02-27 PROCEDURE — 87102 FUNGUS ISOLATION CULTURE: CPT | Performed by: INTERNAL MEDICINE

## 2023-02-27 PROCEDURE — 94799 UNLISTED PULMONARY SVC/PX: CPT

## 2023-02-27 PROCEDURE — 25010000002 ENOXAPARIN PER 10 MG: Performed by: INTERNAL MEDICINE

## 2023-02-27 PROCEDURE — 87071 CULTURE AEROBIC QUANT OTHER: CPT | Performed by: INTERNAL MEDICINE

## 2023-02-27 PROCEDURE — 0BBF8ZX EXCISION OF RIGHT LOWER LUNG LOBE, VIA NATURAL OR ARTIFICIAL OPENING ENDOSCOPIC, DIAGNOSTIC: ICD-10-PCS | Performed by: INTERNAL MEDICINE

## 2023-02-27 PROCEDURE — 94761 N-INVAS EAR/PLS OXIMETRY MLT: CPT

## 2023-02-27 PROCEDURE — C1726 CATH, BAL DIL, NON-VASCULAR: HCPCS | Performed by: INTERNAL MEDICINE

## 2023-02-27 PROCEDURE — 25010000002 PHENYLEPHRINE 10 MG/ML SOLUTION: Performed by: NURSE ANESTHETIST, CERTIFIED REGISTERED

## 2023-02-27 PROCEDURE — 88112 CYTOPATH CELL ENHANCE TECH: CPT | Performed by: INTERNAL MEDICINE

## 2023-02-27 PROCEDURE — 85025 COMPLETE CBC W/AUTO DIFF WBC: CPT | Performed by: INTERNAL MEDICINE

## 2023-02-27 PROCEDURE — 63710000001 PREDNISONE PER 1 MG: Performed by: INTERNAL MEDICINE

## 2023-02-27 PROCEDURE — 88312 SPECIAL STAINS GROUP 1: CPT | Performed by: INTERNAL MEDICINE

## 2023-02-27 PROCEDURE — 88305 TISSUE EXAM BY PATHOLOGIST: CPT | Performed by: INTERNAL MEDICINE

## 2023-02-27 PROCEDURE — 80048 BASIC METABOLIC PNL TOTAL CA: CPT | Performed by: INTERNAL MEDICINE

## 2023-02-27 RX ORDER — LIDOCAINE HYDROCHLORIDE 10 MG/ML
INJECTION, SOLUTION EPIDURAL; INFILTRATION; INTRACAUDAL; PERINEURAL AS NEEDED
Status: DISCONTINUED | OUTPATIENT
Start: 2023-02-27 | End: 2023-02-27 | Stop reason: HOSPADM

## 2023-02-27 RX ORDER — GLYCOPYRROLATE 0.2 MG/ML
INJECTION INTRAMUSCULAR; INTRAVENOUS AS NEEDED
Status: DISCONTINUED | OUTPATIENT
Start: 2023-02-27 | End: 2023-02-27 | Stop reason: SURG

## 2023-02-27 RX ORDER — SODIUM CHLORIDE 9 MG/ML
30 INJECTION, SOLUTION INTRAVENOUS CONTINUOUS PRN
Status: DISCONTINUED | OUTPATIENT
Start: 2023-02-27 | End: 2023-02-28 | Stop reason: HOSPADM

## 2023-02-27 RX ORDER — SODIUM CHLORIDE 0.9 % (FLUSH) 0.9 %
10 SYRINGE (ML) INJECTION AS NEEDED
Status: DISCONTINUED | OUTPATIENT
Start: 2023-02-27 | End: 2023-02-28 | Stop reason: HOSPADM

## 2023-02-27 RX ORDER — LIDOCAINE HYDROCHLORIDE 20 MG/ML
INJECTION, SOLUTION EPIDURAL; INFILTRATION; INTRACAUDAL; PERINEURAL AS NEEDED
Status: DISCONTINUED | OUTPATIENT
Start: 2023-02-27 | End: 2023-02-27 | Stop reason: HOSPADM

## 2023-02-27 RX ORDER — SODIUM CHLORIDE 9 MG/ML
40 INJECTION, SOLUTION INTRAVENOUS AS NEEDED
Status: DISCONTINUED | OUTPATIENT
Start: 2023-02-27 | End: 2023-02-28 | Stop reason: HOSPADM

## 2023-02-27 RX ORDER — LIDOCAINE HYDROCHLORIDE 20 MG/ML
INJECTION, SOLUTION INFILTRATION; PERINEURAL AS NEEDED
Status: DISCONTINUED | OUTPATIENT
Start: 2023-02-27 | End: 2023-02-27 | Stop reason: SURG

## 2023-02-27 RX ORDER — PROPOFOL 10 MG/ML
VIAL (ML) INTRAVENOUS AS NEEDED
Status: DISCONTINUED | OUTPATIENT
Start: 2023-02-27 | End: 2023-02-27 | Stop reason: SURG

## 2023-02-27 RX ORDER — EPHEDRINE SULFATE 50 MG/ML
INJECTION, SOLUTION INTRAVENOUS AS NEEDED
Status: DISCONTINUED | OUTPATIENT
Start: 2023-02-27 | End: 2023-02-27 | Stop reason: SURG

## 2023-02-27 RX ORDER — SODIUM CHLORIDE 0.9 % (FLUSH) 0.9 %
10 SYRINGE (ML) INJECTION EVERY 12 HOURS SCHEDULED
Status: DISCONTINUED | OUTPATIENT
Start: 2023-02-27 | End: 2023-02-28 | Stop reason: HOSPADM

## 2023-02-27 RX ORDER — PHENYLEPHRINE HYDROCHLORIDE 10 MG/ML
INJECTION INTRAVENOUS AS NEEDED
Status: DISCONTINUED | OUTPATIENT
Start: 2023-02-27 | End: 2023-02-27 | Stop reason: SURG

## 2023-02-27 RX ADMIN — ROSUVASTATIN CALCIUM 10 MG: 10 TABLET, FILM COATED ORAL at 15:11

## 2023-02-27 RX ADMIN — GUAIFENESIN 600 MG: 600 TABLET, EXTENDED RELEASE ORAL at 15:11

## 2023-02-27 RX ADMIN — Medication 10 ML: at 20:48

## 2023-02-27 RX ADMIN — PROPOFOL 200 MG: 10 INJECTION, EMULSION INTRAVENOUS at 11:25

## 2023-02-27 RX ADMIN — FUROSEMIDE 20 MG: 20 TABLET ORAL at 15:11

## 2023-02-27 RX ADMIN — MONTELUKAST SODIUM 10 MG: 10 TABLET, FILM COATED ORAL at 20:47

## 2023-02-27 RX ADMIN — TRAZODONE HYDROCHLORIDE 100 MG: 100 TABLET ORAL at 23:01

## 2023-02-27 RX ADMIN — PREDNISONE 40 MG: 20 TABLET ORAL at 15:11

## 2023-02-27 RX ADMIN — TRAZODONE HYDROCHLORIDE 100 MG: 100 TABLET ORAL at 00:17

## 2023-02-27 RX ADMIN — ALLOPURINOL 100 MG: 100 TABLET ORAL at 15:11

## 2023-02-27 RX ADMIN — Medication 10 ML: at 15:17

## 2023-02-27 RX ADMIN — PHENYLEPHRINE HYDROCHLORIDE 100 MCG: 10 INJECTION INTRAVENOUS at 11:47

## 2023-02-27 RX ADMIN — IPRATROPIUM BROMIDE AND ALBUTEROL SULFATE 3 ML: 2.5; .5 SOLUTION RESPIRATORY (INHALATION) at 15:34

## 2023-02-27 RX ADMIN — EPHEDRINE SULFATE 10 MG: 50 INJECTION INTRAVENOUS at 11:47

## 2023-02-27 RX ADMIN — AZATHIOPRINE 50 MG: 50 TABLET ORAL at 15:11

## 2023-02-27 RX ADMIN — PHENYLEPHRINE HYDROCHLORIDE 100 MCG: 10 INJECTION INTRAVENOUS at 11:53

## 2023-02-27 RX ADMIN — LIDOCAINE HYDROCHLORIDE 60 MG: 20 INJECTION, SOLUTION INFILTRATION; PERINEURAL at 11:25

## 2023-02-27 RX ADMIN — BUDESONIDE AND FORMOTEROL FUMARATE DIHYDRATE 2 PUFF: 160; 4.5 AEROSOL RESPIRATORY (INHALATION) at 21:16

## 2023-02-27 RX ADMIN — GUAIFENESIN 600 MG: 600 TABLET, EXTENDED RELEASE ORAL at 20:47

## 2023-02-27 RX ADMIN — BUDESONIDE AND FORMOTEROL FUMARATE DIHYDRATE 2 PUFF: 160; 4.5 AEROSOL RESPIRATORY (INHALATION) at 08:48

## 2023-02-27 RX ADMIN — ENOXAPARIN SODIUM 100 MG: 100 INJECTION SUBCUTANEOUS at 20:47

## 2023-02-27 RX ADMIN — EPHEDRINE SULFATE 10 MG: 50 INJECTION INTRAVENOUS at 11:39

## 2023-02-27 RX ADMIN — PROPOFOL 300 MCG/KG/MIN: 10 INJECTION, EMULSION INTRAVENOUS at 11:25

## 2023-02-27 RX ADMIN — SODIUM CHLORIDE 30 ML/HR: 9 INJECTION, SOLUTION INTRAVENOUS at 09:38

## 2023-02-27 RX ADMIN — GLYCOPYRROLATE 0.2 MG: 0.2 INJECTION INTRAMUSCULAR; INTRAVENOUS at 11:23

## 2023-02-27 NOTE — ANESTHESIA POSTPROCEDURE EVALUATION
"Patient: Orion Casiano    Procedure Summary     Date: 02/27/23 Room / Location:  [facility] /  [facility]    Anesthesia Start: 1122 Anesthesia Stop: 1229    Procedure: BRONCHOSCOPY WITH FLUORO, BAL, AND BIOPSIES AND ENDOBRONCHIAL ULTRASOUND WITH FNA ([place]) Diagnosis:       Interstitial lung disease (HCC)      (Interstitial lung disease (HCC) [J84.9])    Surgeons: Rubens Sheets MD Provider: Boone Kulkarni MD    Anesthesia Type: general ASA Status: 3          Anesthesia Type: general    Vitals  Vitals Value Taken Time   /80 02/27/23 1247   Temp     Pulse 51 02/27/23 1238   Resp 18 02/27/23 1238   SpO2 95 % 02/27/23 1238           Post Anesthesia Care and Evaluation    Patient location during evaluation: bedside  Patient participation: complete - patient participated  Level of consciousness: awake and alert  Pain management: adequate    Airway patency: patent  Anesthetic complications: No anesthetic complications    Cardiovascular status: acceptable  Respiratory status: acceptable  Hydration status: acceptable    Comments: /80   Pulse 51   Temp 36.5 °C (97.7 °F) (Oral)   Resp 18   Ht 188 cm (74.02\")   Wt 102 kg (224 lb 13.9 oz)   SpO2 95%   BMI 28.86 kg/m²       "

## 2023-02-27 NOTE — ANESTHESIA PREPROCEDURE EVALUATION
Anesthesia Evaluation     NPO Solid Status: > 8 hours             Airway   Mallampati: II  TM distance: >3 FB  Neck ROM: full  No difficulty expected  Dental - normal exam     Pulmonary    (+) asthma,wheezes,   Cardiovascular     ECG reviewed  Rhythm: regular    (+) hypertension, dysrhythmias Atrial Fib, hyperlipidemia,   (-) murmur    ROS comment: RA:/10/7  RV: 59/7  PA: 58/18/34  PCWP:/36/19     CO: 4.3 L/min  CI: 1.88 L/min/m²     PVR: 3.4 Wood unit     Saturations  SVC: 64%  PA: 66%  R heart cath moderate pulmonary htn    Echo- Mild TR no AS    Neuro/Psych  GI/Hepatic/Renal/Endo      Musculoskeletal     Abdominal    Substance History      OB/GYN          Other                      Anesthesia Plan    ASA 3     general     (  D/W R&B of GA including but not limited to: heart, lung, liver, kidney, neurologic problems, positioning injuries, dental damage, corneal abrasion and TMJ.  .    sats 93%)  intravenous induction     Anesthetic plan, risks, benefits, and alternatives have been provided, discussed and informed consent has been obtained with: patient.        CODE STATUS:    Code Status (Patient has no pulse and is not breathing): CPR (Attempt to Resuscitate)  Medical Interventions (Patient has pulse or is breathing): Full Support

## 2023-02-27 NOTE — ANESTHESIA PROCEDURE NOTES
Airway  Urgency: elective    Date/Time: 2/27/2023 11:27 AM    General Information and Staff    Patient location during procedure: OR  Anesthesiologist: Render, Boone Ray, MD  CRNA/CAA: Iliana Camacho CRNA    Indications and Patient Condition    Preoxygenated: yes  Mask difficulty assessment: 0 - not attempted    Final Airway Details  Final airway type: supraglottic airway      Successful airway: bronch  Size 4     Number of attempts at approach: 1  Assessment: lips, teeth, and gum same as pre-op and atraumatic intubation    Additional Comments  Atraumatic, adeq seal, secured, MV/AV until SV

## 2023-02-28 VITALS
DIASTOLIC BLOOD PRESSURE: 86 MMHG | BODY MASS INDEX: 28.86 KG/M2 | HEART RATE: 60 BPM | HEIGHT: 74 IN | RESPIRATION RATE: 18 BRPM | WEIGHT: 224.87 LBS | SYSTOLIC BLOOD PRESSURE: 148 MMHG | OXYGEN SATURATION: 91 % | TEMPERATURE: 97.7 F

## 2023-02-28 PROBLEM — E87.1 HYPONATREMIA: Status: RESOLVED | Noted: 2023-02-24 | Resolved: 2023-02-28

## 2023-02-28 LAB
ANION GAP SERPL CALCULATED.3IONS-SCNC: 8.3 MMOL/L (ref 5–15)
BASOPHILS # BLD AUTO: 0.02 10*3/MM3 (ref 0–0.2)
BASOPHILS NFR BLD AUTO: 0.3 % (ref 0–1.5)
BUN SERPL-MCNC: 16 MG/DL (ref 8–23)
BUN/CREAT SERPL: 19.5 (ref 7–25)
CALCIUM SPEC-SCNC: 8.3 MG/DL (ref 8.6–10.5)
CHLORIDE SERPL-SCNC: 101 MMOL/L (ref 98–107)
CO2 SERPL-SCNC: 26.7 MMOL/L (ref 22–29)
CREAT SERPL-MCNC: 0.82 MG/DL (ref 0.76–1.27)
CYTO UR: NORMAL
CYTO UR: NORMAL
DEPRECATED RDW RBC AUTO: 44.2 FL (ref 37–54)
EGFRCR SERPLBLD CKD-EPI 2021: 93.3 ML/MIN/1.73
EOSINOPHIL # BLD AUTO: 0.02 10*3/MM3 (ref 0–0.4)
EOSINOPHIL NFR BLD AUTO: 0.3 % (ref 0.3–6.2)
ERYTHROCYTE [DISTWIDTH] IN BLOOD BY AUTOMATED COUNT: 12.9 % (ref 12.3–15.4)
GLUCOSE SERPL-MCNC: 126 MG/DL (ref 65–99)
HCT VFR BLD AUTO: 38.6 % (ref 37.5–51)
HGB BLD-MCNC: 12.4 G/DL (ref 13–17.7)
IMM GRANULOCYTES # BLD AUTO: 0.02 10*3/MM3 (ref 0–0.05)
IMM GRANULOCYTES NFR BLD AUTO: 0.3 % (ref 0–0.5)
LAB AP CASE REPORT: NORMAL
LAB AP CLINICAL INFORMATION: NORMAL
LAB AP DIAGNOSIS COMMENT: NORMAL
LAB AP NON-GYN SPECIMEN ADEQUACY: NORMAL
LAB AP SPECIAL STAINS: NORMAL
LYMPHOCYTES # BLD AUTO: 1.84 10*3/MM3 (ref 0.7–3.1)
LYMPHOCYTES NFR BLD AUTO: 29.5 % (ref 19.6–45.3)
MCH RBC QN AUTO: 30.2 PG (ref 26.6–33)
MCHC RBC AUTO-ENTMCNC: 32.1 G/DL (ref 31.5–35.7)
MCV RBC AUTO: 94.1 FL (ref 79–97)
MONOCYTES # BLD AUTO: 0.46 10*3/MM3 (ref 0.1–0.9)
MONOCYTES NFR BLD AUTO: 7.4 % (ref 5–12)
NEUTROPHILS NFR BLD AUTO: 3.88 10*3/MM3 (ref 1.7–7)
NEUTROPHILS NFR BLD AUTO: 62.2 % (ref 42.7–76)
NRBC BLD AUTO-RTO: 0 /100 WBC (ref 0–0.2)
PATH REPORT.FINAL DX SPEC: NORMAL
PATH REPORT.GROSS SPEC: NORMAL
PLATELET # BLD AUTO: 233 10*3/MM3 (ref 140–450)
PMV BLD AUTO: 11.4 FL (ref 6–12)
POTASSIUM SERPL-SCNC: 4.4 MMOL/L (ref 3.5–5.2)
RBC # BLD AUTO: 4.1 10*6/MM3 (ref 4.14–5.8)
SODIUM SERPL-SCNC: 136 MMOL/L (ref 136–145)
WBC NRBC COR # BLD: 6.24 10*3/MM3 (ref 3.4–10.8)

## 2023-02-28 PROCEDURE — 86235 NUCLEAR ANTIGEN ANTIBODY: CPT | Performed by: INTERNAL MEDICINE

## 2023-02-28 PROCEDURE — 94761 N-INVAS EAR/PLS OXIMETRY MLT: CPT

## 2023-02-28 PROCEDURE — 85025 COMPLETE CBC W/AUTO DIFF WBC: CPT | Performed by: NURSE PRACTITIONER

## 2023-02-28 PROCEDURE — 94799 UNLISTED PULMONARY SVC/PX: CPT

## 2023-02-28 PROCEDURE — 86331 IMMUNODIFFUSION OUCHTERLONY: CPT | Performed by: INTERNAL MEDICINE

## 2023-02-28 PROCEDURE — 86606 ASPERGILLUS ANTIBODY: CPT | Performed by: INTERNAL MEDICINE

## 2023-02-28 PROCEDURE — 94664 DEMO&/EVAL PT USE INHALER: CPT

## 2023-02-28 PROCEDURE — 86609 BACTERIUM ANTIBODY: CPT | Performed by: INTERNAL MEDICINE

## 2023-02-28 PROCEDURE — 86602 ANTINOMYCES ANTIBODY: CPT | Performed by: INTERNAL MEDICINE

## 2023-02-28 PROCEDURE — 80048 BASIC METABOLIC PNL TOTAL CA: CPT | Performed by: NURSE PRACTITIONER

## 2023-02-28 PROCEDURE — 86225 DNA ANTIBODY NATIVE: CPT | Performed by: INTERNAL MEDICINE

## 2023-02-28 PROCEDURE — 25010000002 ENOXAPARIN PER 10 MG: Performed by: INTERNAL MEDICINE

## 2023-02-28 PROCEDURE — 63710000001 PREDNISONE PER 1 MG: Performed by: INTERNAL MEDICINE

## 2023-02-28 PROCEDURE — 63710000001 AZATHIOPRINE PER 50 MG: Performed by: INTERNAL MEDICINE

## 2023-02-28 PROCEDURE — 86671 FUNGUS NES ANTIBODY: CPT | Performed by: INTERNAL MEDICINE

## 2023-02-28 PROCEDURE — 99232 SBSQ HOSP IP/OBS MODERATE 35: CPT | Performed by: INTERNAL MEDICINE

## 2023-02-28 RX ORDER — FUROSEMIDE 20 MG/1
20 TABLET ORAL DAILY
Qty: 30 TABLET | Refills: 0 | Status: SHIPPED | OUTPATIENT
Start: 2023-03-01 | End: 2023-04-04 | Stop reason: SDUPTHER

## 2023-02-28 RX ORDER — GUAIFENESIN 600 MG/1
600 TABLET, EXTENDED RELEASE ORAL EVERY 12 HOURS SCHEDULED
Start: 2023-02-28 | End: 2023-03-08

## 2023-02-28 RX ORDER — IPRATROPIUM BROMIDE AND ALBUTEROL SULFATE 2.5; .5 MG/3ML; MG/3ML
3 SOLUTION RESPIRATORY (INHALATION)
Qty: 360 ML | Refills: 0 | Status: SHIPPED | OUTPATIENT
Start: 2023-02-28

## 2023-02-28 RX ADMIN — GUAIFENESIN 600 MG: 600 TABLET, EXTENDED RELEASE ORAL at 09:05

## 2023-02-28 RX ADMIN — Medication 10 ML: at 09:05

## 2023-02-28 RX ADMIN — BUDESONIDE AND FORMOTEROL FUMARATE DIHYDRATE 2 PUFF: 160; 4.5 AEROSOL RESPIRATORY (INHALATION) at 07:09

## 2023-02-28 RX ADMIN — ENOXAPARIN SODIUM 100 MG: 100 INJECTION SUBCUTANEOUS at 09:04

## 2023-02-28 RX ADMIN — AZATHIOPRINE 50 MG: 50 TABLET ORAL at 09:05

## 2023-02-28 RX ADMIN — ALLOPURINOL 100 MG: 100 TABLET ORAL at 09:05

## 2023-02-28 RX ADMIN — PREDNISONE 40 MG: 20 TABLET ORAL at 09:05

## 2023-02-28 RX ADMIN — FUROSEMIDE 20 MG: 20 TABLET ORAL at 09:05

## 2023-02-28 RX ADMIN — ROSUVASTATIN CALCIUM 10 MG: 10 TABLET, FILM COATED ORAL at 09:05

## 2023-03-01 ENCOUNTER — READMISSION MANAGEMENT (OUTPATIENT)
Dept: CALL CENTER | Facility: HOSPITAL | Age: 73
End: 2023-03-01
Payer: MEDICARE

## 2023-03-01 LAB
BACTERIA SPEC AEROBE CULT: NO GROWTH
CENTROMERE B AB SER-ACNC: <0.2 AI (ref 0–0.9)
CHROMATIN AB SERPL-ACNC: <0.2 AI (ref 0–0.9)
DSDNA AB SER-ACNC: <1 IU/ML (ref 0–9)
ENA JO1 AB SER-ACNC: <0.2 AI (ref 0–0.9)
ENA RNP AB SER-ACNC: <0.2 AI (ref 0–0.9)
ENA SCL70 AB SER-ACNC: <0.2 AI (ref 0–0.9)
ENA SM AB SER-ACNC: <0.2 AI (ref 0–0.9)
ENA SS-A AB SER-ACNC: <0.2 AI (ref 0–0.9)
ENA SS-B AB SER-ACNC: <0.2 AI (ref 0–0.9)
GRAM STN SPEC: NORMAL
HCT VFR BLDA CALC: 39 % (ref 38–51)
HGB BLDA-MCNC: 13.3 G/DL (ref 12–17)
Lab: NORMAL
SAO2 % BLDA: 64 % (ref 95–98)

## 2023-03-01 NOTE — OUTREACH NOTE
Prep Survey    Flowsheet Row Responses   [facility] discharged from? [facility]   Is LACE score < 7 ? No   Eligibility Readm Mgmt   Discharge diagnosis acute respiratory failure with hypoxia   Does the patient have one of the following disease processes/diagnoses(primary or secondary)? Other   Does the patient have Home health ordered? No   Is there a DME ordered? Yes   What DME was ordered? [facility] - [employer]   Prep survey completed? Yes          Janie RUCKER - Registered Nurse

## 2023-03-06 LAB
A FUMIGATUS IGG SER QL: NEGATIVE
A PULLULANS IGG SER QL: NEGATIVE
LACEYELLA SACCHARI AB SER QL ID: NEGATIVE
PIGEON SERUM IGG QL: NEGATIVE
S RECTIVIRGULA IGG SER QL ID: NEGATIVE
T VULGARIS IGG SER QL: NEGATIVE

## 2023-03-08 ENCOUNTER — OFFICE VISIT (OUTPATIENT)
Dept: FAMILY MEDICINE CLINIC | Facility: CLINIC | Age: 73
End: 2023-03-08
Payer: MEDICARE

## 2023-03-08 VITALS
BODY MASS INDEX: 31.07 KG/M2 | WEIGHT: 242.1 LBS | HEIGHT: 74 IN | HEART RATE: 84 BPM | RESPIRATION RATE: 20 BRPM | SYSTOLIC BLOOD PRESSURE: 124 MMHG | TEMPERATURE: 97.7 F | DIASTOLIC BLOOD PRESSURE: 76 MMHG

## 2023-03-08 DIAGNOSIS — E78.2 MIXED HYPERLIPIDEMIA: ICD-10-CM

## 2023-03-08 DIAGNOSIS — I48.91 NEW ONSET ATRIAL FIBRILLATION: Primary | ICD-10-CM

## 2023-03-08 DIAGNOSIS — J84.9 CHRONIC INTERSTITIAL LUNG DISEASE: ICD-10-CM

## 2023-03-08 DIAGNOSIS — I10 PRIMARY HYPERTENSION: ICD-10-CM

## 2023-03-08 DIAGNOSIS — Z79.899 HIGH RISK MEDICATIONS (NOT ANTICOAGULANTS) LONG-TERM USE: ICD-10-CM

## 2023-03-08 PROCEDURE — 3074F SYST BP LT 130 MM HG: CPT | Performed by: FAMILY MEDICINE

## 2023-03-08 PROCEDURE — 99214 OFFICE O/P EST MOD 30 MIN: CPT | Performed by: FAMILY MEDICINE

## 2023-03-08 PROCEDURE — 1159F MED LIST DOCD IN RCRD: CPT | Performed by: FAMILY MEDICINE

## 2023-03-08 PROCEDURE — 3078F DIAST BP <80 MM HG: CPT | Performed by: FAMILY MEDICINE

## 2023-03-08 RX ORDER — DOXYCYCLINE HYCLATE 100 MG/1
100 CAPSULE ORAL 2 TIMES DAILY
COMMUNITY

## 2023-03-08 RX ORDER — LISINOPRIL 10 MG/1
10 TABLET ORAL DAILY
COMMUNITY

## 2023-03-08 NOTE — PROGRESS NOTES
"Chief Complaint  Hospital Follow Up Visit, Hypertension, and Atrial Fibrillation    Subjective    History of Present Illness {CC  Problem List  Visit  Diagnosis   Encounters  Notes  Medications  Labs  Result Review Imaging  Media :23}     Orion Casiano presents to [facility] for Hospital Follow Up Visit, Hypertension, and Atrial Fibrillation.  History of Present Illness     He has a history of hypertension, asthma,  interstitial lung disease and hyperlipidemia. He was hospitalized 2/24-2/28 for new onset atrial fibrillation and hypoxia. Furosemide, eliquis and doxycycline (1 day remaining) were added.  During hospitalization his amlodipine and lisinopril were held due to low blood pressure. Per discharge summary he was to resume amlodipine at discharge but he has not and his blood pressure has remained good. He has tolerated the medication change. He has follow up with cardiologist and pulmonologist scheduled. He denies any palpiations, chest pain or dyspnea since discharge.       Objective     Vital Signs:   /76 (BP Location: Right arm, Patient Position: Sitting, Cuff Size: Large Adult)   Pulse 84   Temp 97.7 °F (36.5 °C) (Oral)   Resp 20   Ht 186.7 cm (73.5\")   Wt 110 kg (242 lb 1.6 oz)   BMI 31.51 kg/m²   Body mass index is 31.51 kg/m².     Physical Exam  Constitutional:       General: He is not in acute distress.  Cardiovascular:      Rate and Rhythm: Normal rate. Rhythm irregular.   Pulmonary:      Effort: No respiratory distress.      Breath sounds: No wheezing.   Musculoskeletal:      Right lower leg: Edema present.      Left lower leg: Edema (1+) present.   Neurological:      Mental Status: He is alert.          Result Review  Data Reviewed:{ Labs  Result Review  Imaging  Med Tab  Media :23}       His last lipid panel was November 15, 2022 and his LDL was stable at 84.  Hospital labs, H&P, and discharge summary were reviewed.         Assessment and " Plan {CC Problem List  Visit Diagnosis  ROS  Review (Popup)  Health Maintenance  Quality  BestPractice  Medications  SmartSets  SnapShot Encounters  Media :23}   Diagnoses and all orders for this visit:    1. New onset atrial fibrillation (HCC) (Primary)    2. Primary hypertension  -     Comprehensive Metabolic Panel    3. Mixed hyperlipidemia    4. Chronic interstitial lung disease (HCC)    5. High risk medications (not anticoagulants) long-term use  -     Comprehensive Metabolic Panel        Patient Instructions   Continue your current medications.   We are rechecking electrolytes and kidney function since you are on lasix. You should receive a call or my chart message with your test results. If you have not received your results in the next 7-10 days, please contact the office.    Monitor blood pressure outside the office. If above 130/80s call or return to office so that we can adjust meds. Would start with resuming amlodipine 5mg.     Follow-up with your pulmonologist and cardiologist as scheduled.  Patient was given instructions and counseling regarding his condition or for health maintenance advice on the AVS.       Return in about 2 months (around 5/8/2023).  Recheck sooner if any changes or new problems arise.    Niecy Galvan MD

## 2023-03-08 NOTE — PATIENT INSTRUCTIONS
Continue your current medications.   We are rechecking electrolytes and kidney function since you are on lasix. You should receive a call or my chart message with your test results. If you have not received your results in the next 7-10 days, please contact the office.    Monitor blood pressure outside the office. If above 130/80s call or return to office so that we can adjust meds. Would start with resuming amlodipine 5mg.

## 2023-03-09 LAB
ALBUMIN SERPL-MCNC: 3.7 G/DL (ref 3.7–4.7)
ALBUMIN/GLOB SERPL: 1.4 {RATIO} (ref 1.2–2.2)
ALP SERPL-CCNC: 99 IU/L (ref 44–121)
ALT SERPL-CCNC: 15 IU/L (ref 0–44)
AST SERPL-CCNC: 27 IU/L (ref 0–40)
BILIRUB SERPL-MCNC: 1.4 MG/DL (ref 0–1.2)
BUN SERPL-MCNC: 9 MG/DL (ref 8–27)
BUN/CREAT SERPL: 10 (ref 10–24)
CALCIUM SERPL-MCNC: 9.2 MG/DL (ref 8.6–10.2)
CHLORIDE SERPL-SCNC: 101 MMOL/L (ref 96–106)
CO2 SERPL-SCNC: 21 MMOL/L (ref 20–29)
CREAT SERPL-MCNC: 0.92 MG/DL (ref 0.76–1.27)
EGFRCR SERPLBLD CKD-EPI 2021: 88 ML/MIN/1.73
GLOBULIN SER CALC-MCNC: 2.7 G/DL (ref 1.5–4.5)
GLUCOSE SERPL-MCNC: 95 MG/DL (ref 70–99)
POTASSIUM SERPL-SCNC: 4.4 MMOL/L (ref 3.5–5.2)
PROT SERPL-MCNC: 6.4 G/DL (ref 6–8.5)
SODIUM SERPL-SCNC: 139 MMOL/L (ref 134–144)

## 2023-03-10 ENCOUNTER — READMISSION MANAGEMENT (OUTPATIENT)
Dept: CALL CENTER | Facility: HOSPITAL | Age: 73
End: 2023-03-10
Payer: MEDICARE

## 2023-03-10 NOTE — OUTREACH NOTE
Medical Week 1 Survey    Flowsheet Row Responses   [facility] patient discharged from? [place]   Does the patient have one of the following disease processes/diagnoses(primary or secondary)? Other   Week 1 attempt successful? No   Unsuccessful attempts Attempt 1          Fidelina Rico Licensed Nurse

## 2023-03-14 ENCOUNTER — READMISSION MANAGEMENT (OUTPATIENT)
Dept: CALL CENTER | Facility: HOSPITAL | Age: 73
End: 2023-03-14
Payer: MEDICARE

## 2023-03-14 NOTE — OUTREACH NOTE
Medical Week 1 Survey    Flowsheet Row Responses   [facility] patient discharged from? [place]   Does the patient have one of the following disease processes/diagnoses(primary or secondary)? Other   Week 1 attempt successful? No   Unsuccessful attempts Attempt 2          Gregor ANGUIANO - Registered Nurse

## 2023-03-17 ENCOUNTER — TRANSCRIBE ORDERS (OUTPATIENT)
Dept: ADMINISTRATIVE | Facility: HOSPITAL | Age: 73
End: 2023-03-17
Payer: MEDICARE

## 2023-03-17 DIAGNOSIS — J84.9 ILD (INTERSTITIAL LUNG DISEASE): Primary | ICD-10-CM

## 2023-03-20 ENCOUNTER — TELEPHONE (OUTPATIENT)
Dept: GASTROENTEROLOGY | Facility: CLINIC | Age: 73
End: 2023-03-20
Payer: MEDICARE

## 2023-03-21 ENCOUNTER — READMISSION MANAGEMENT (OUTPATIENT)
Dept: CALL CENTER | Facility: HOSPITAL | Age: 73
End: 2023-03-21
Payer: MEDICARE

## 2023-03-21 NOTE — OUTREACH NOTE
Medical Week 3 Survey    Flowsheet Row Responses   [facility] [place] patient discharged from? [place]   Does the patient have one of the following disease processes/diagnoses(primary or secondary)? Other   Week 3 attempt successful? No   Unsuccessful attempts Attempt 1   Discharge diagnosis acute respiratory failure with hypoxia          MERE SAUNDERS - Registered Nurse

## 2023-03-23 ENCOUNTER — TELEPHONE (OUTPATIENT)
Dept: GASTROENTEROLOGY | Facility: CLINIC | Age: 73
End: 2023-03-23
Payer: MEDICARE

## 2023-03-23 NOTE — TELEPHONE ENCOUNTER
Hub staff attempted to follow warm transfer process and was unsuccessful     Caller: Orion Casiano    Relationship to patient: Self    Best call back number: [phone]    Patient is needing: PT RETURNING CALL TO SCHEDULE COLONOSCOPY

## 2023-03-24 PROBLEM — Z86.010 HISTORY OF ADENOMATOUS POLYP OF COLON: Status: ACTIVE | Noted: 2023-03-24

## 2023-03-24 NOTE — TELEPHONE ENCOUNTER
TITI Sanchez for colonoscopy on 9/19/23  arrive at 930am   . Mailed Prep instructions to Mailing address on-file. ----miralax

## 2023-03-27 LAB — FUNGUS WND CULT: NORMAL

## 2023-04-04 RX ORDER — FUROSEMIDE 20 MG/1
20 TABLET ORAL DAILY
Qty: 30 TABLET | Refills: 0 | Status: SHIPPED | OUTPATIENT
Start: 2023-04-04 | End: 2023-04-17 | Stop reason: SDUPTHER

## 2023-04-04 NOTE — TELEPHONE ENCOUNTER
Rx Refill Note  Requested Prescriptions     Pending Prescriptions Disp Refills   • furosemide (LASIX) 20 MG tablet 30 tablet 0     Sig: Take 1 tablet by mouth Daily.   • apixaban (Eliquis) 5 MG tablet tablet 60 tablet 0     Sig: Take 1 tablet by mouth 2 (Two) Times a Day.      Last office visit with prescribing clinician: 3/8/2023   Next office visit with prescribing clinician: 5/8/2023     Tor Hancock CMA  04/04/23, 09:23 EDT

## 2023-04-09 RX ORDER — AZELASTINE HYDROCHLORIDE 137 UG/1
SPRAY, METERED NASAL
Qty: 30 ML | Refills: 1 | Status: SHIPPED | OUTPATIENT
Start: 2023-04-09

## 2023-04-10 LAB
MYCOBACTERIUM SPEC CULT: NORMAL
NIGHT BLUE STAIN TISS: NORMAL

## 2023-04-17 DIAGNOSIS — I48.91 ATRIAL FIBRILLATION, UNSPECIFIED TYPE: Primary | ICD-10-CM

## 2023-04-17 DIAGNOSIS — L03.90 CELLULITIS, UNSPECIFIED CELLULITIS SITE: ICD-10-CM

## 2023-04-17 RX ORDER — FUROSEMIDE 20 MG/1
20 TABLET ORAL DAILY
Qty: 30 TABLET | Refills: 0 | Status: SHIPPED | OUTPATIENT
Start: 2023-04-17

## 2023-04-17 NOTE — TELEPHONE ENCOUNTER
Caller: [facility] [id number] - [place], [place] - [address] [address] [facility] [address] [facility] [phone] [place] [phone] FX    Relationship: Pharmacy    Best call back number: [phone]    Requested Prescriptions:   Requested Prescriptions     Pending Prescriptions Disp Refills   • apixaban (Eliquis) 5 MG tablet tablet 60 tablet 0     Sig: Take 1 tablet by mouth 2 (Two) Times a Day.   • furosemide (LASIX) 20 MG tablet 30 tablet 0     Sig: Take 1 tablet by mouth Daily.        Pharmacy where request should be sent: [facility] [id number] - [place], [place] - [address] [address] AT [facility] [phone] [facility] [phone] FX     Last office visit with prescribing clinician: 3/8/2023   Last telemedicine visit with prescribing clinician: 5/8/2023   Next office visit with prescribing clinician: 5/8/2023     Additional details provided by patient: PATIENT IS OUT OF THESE MEDICATION.     Does the patient have less than a 3 day supply:  [x] Yes  [] No    Man Ruby Rep   04/17/23 14:01 EDT

## 2023-04-17 NOTE — TELEPHONE ENCOUNTER
Rx Refill Note  Requested Prescriptions     Pending Prescriptions Disp Refills   • apixaban (Eliquis) 5 MG tablet tablet 60 tablet 0     Sig: Take 1 tablet by mouth 2 (Two) Times a Day.   • furosemide (LASIX) 20 MG tablet 30 tablet 0     Sig: Take 1 tablet by mouth Daily.      Last office visit with prescribing clinician: 3/8/2023   Last labs: 3/9/2023   Next office visit with prescribing clinician: 5/8/2023     Refilled medications per protocol.  Sent to [facility] instead of [facility].     Thuy Mauro MA  04/17/23, 14:14 EDT

## 2023-04-26 RX ORDER — METHOTREXATE 2.5 MG/1
TABLET ORAL
COMMUNITY
Start: 2023-04-26

## 2023-04-26 RX ORDER — DOXEPIN HYDROCHLORIDE 25 MG/1
CAPSULE ORAL
COMMUNITY
Start: 2023-04-03

## 2023-04-26 RX ORDER — CLOBETASOL PROPIONATE 0.5 MG/G
OINTMENT TOPICAL
COMMUNITY
Start: 2023-03-14

## 2023-04-26 RX ORDER — RISANKIZUMAB-RZAA 150 MG/ML
INJECTION SUBCUTANEOUS
COMMUNITY
Start: 2023-04-03

## 2023-04-26 RX ORDER — KETOCONAZOLE 20 MG/ML
SHAMPOO TOPICAL
COMMUNITY
Start: 2023-04-03

## 2023-04-26 RX ORDER — METHOCARBAMOL 500 MG/1
TABLET, FILM COATED ORAL
Qty: 90 TABLET | Refills: 0 | Status: SHIPPED | OUTPATIENT
Start: 2023-04-26

## 2023-04-26 NOTE — TELEPHONE ENCOUNTER
Rx Refill Note  Requested Prescriptions     Pending Prescriptions Disp Refills   • methocarbamol (ROBAXIN) 500 MG tablet [Pharmacy Med Name: METHOCARBAMOL 500 MG TABLET] 90 tablet      Sig: TAKE ONE TABLET BY MOUTH FOUR TIMES A DAY AS NEEDED FOR BACK PAIN      Last office visit with prescribing clinician: 3/8/2023   Next office visit with prescribing clinician: 5/8/2023     Tor Hancock CMA  04/26/23, 16:13 EDT

## 2023-05-08 ENCOUNTER — OFFICE VISIT (OUTPATIENT)
Dept: FAMILY MEDICINE CLINIC | Facility: CLINIC | Age: 73
End: 2023-05-08
Payer: MEDICARE

## 2023-05-08 VITALS
TEMPERATURE: 97.4 F | RESPIRATION RATE: 16 BRPM | DIASTOLIC BLOOD PRESSURE: 112 MMHG | HEIGHT: 74 IN | BODY MASS INDEX: 30.98 KG/M2 | HEART RATE: 83 BPM | OXYGEN SATURATION: 96 % | SYSTOLIC BLOOD PRESSURE: 157 MMHG | WEIGHT: 241.4 LBS

## 2023-05-08 DIAGNOSIS — I10 PRIMARY HYPERTENSION: Primary | ICD-10-CM

## 2023-05-08 DIAGNOSIS — J84.9 CHRONIC INTERSTITIAL LUNG DISEASE: ICD-10-CM

## 2023-05-08 DIAGNOSIS — I48.0 PAROXYSMAL ATRIAL FIBRILLATION: ICD-10-CM

## 2023-05-08 DIAGNOSIS — R73.9 HYPERGLYCEMIA: ICD-10-CM

## 2023-05-08 DIAGNOSIS — E78.2 MIXED HYPERLIPIDEMIA: ICD-10-CM

## 2023-05-08 NOTE — PROGRESS NOTES
"Chief Complaint  Hypertension (Took him off the lisinopril and rosuvastatin.  BP was high) and Hyperlipidemia    Subjective    History of Present Illness {CC  Problem List  Visit  Diagnosis   Encounters  Notes  Medications  Labs  Result Review Imaging  Media :23}     Orion Casiano presents to [facility] for Hypertension (Took him off the lisinopril and rosuvastatin.  BP was high) and Hyperlipidemia.  History of Present Illness   He presents for follow up of hypertension. He was hospitalized 2-3 months ago for atrial fibrillation. During hospitalization, his meds were adjusted. HCTZ was stopped because he was put on lasix. His amlodipine and lisinopril were also held. Per discharge summary he was to resume the amlodipine at discharge and possible the lisinopril after he saw the cardiologist, but he was not aware so has not been taking these. He has not monitored his blood pressure outside the office. he denies any headaches, dizziness or chest pain. He has had no further palpitations or increased dyspnea since discharge. He is on eliquis for stroke prevention.    He also presents for follow up of hyperlipidemia. He was on rosuvastatin in the past and his last LDL was 84. This was stopped after his hospitalization and he is not sure why. He tolerated well and Is willing to resume.    He also has history of hyperglycemia. His last A1C was 6.2.    He is seeing the pulmonologist for pulmonary fibrosis and is on methotrexate. He also takes albuterol as needed.      Objective     Vital Signs:   BP (!) 157/112   Pulse 83   Temp 97.4 °F (36.3 °C) (Oral)   Resp 16   Ht 186.7 cm (73.5\")   Wt 109 kg (241 lb 6.4 oz)   SpO2 96%   BMI 31.42 kg/m²   Body mass index is 31.42 kg/m².     Physical Exam  Constitutional:       General: He is not in acute distress.     Appearance: Normal appearance.   Cardiovascular:      Rate and Rhythm: Normal rate and regular rhythm.      Heart sounds: " No murmur heard.  Pulmonary:      Comments: Mild diminished breath sounds throughout, no wheezing  Neurological:      General: No focal deficit present.   Psychiatric:         Behavior: Behavior normal.          Result Review  Data Reviewed:{ Labs  Result Review  Imaging  Med Tab  Media :23}                Assessment and Plan {CC Problem List  Visit Diagnosis  ROS  Review (Popup)  Health Maintenance  Quality  BestPractice  Medications  SmartSets  SnapShot Encounters  Media :23}   Diagnoses and all orders for this visit:    1. Primary hypertension (Primary)  -     Comprehensive Metabolic Panel    2. Paroxysmal atrial fibrillation    3. Mixed hyperlipidemia    4. Chronic interstitial lung disease    5. Hyperglycemia        Patient Instructions   Continue current medications.  Resume the amlodipine 10mg daily.  Monitor blood pressure outside the office. If above 130/80s call or return to office so that we can adjust medication (add back in lisinopril).  Resume the rosuvastatin for your cholesterol.   I would like to recheck and do fasting labs in 3 months.  Follow up with cardiologist and pulmonologist as scheduled.       Patient was given instructions and counseling regarding his condition or for health maintenance advice on the AVS.       Return in about 3 months (around 8/8/2023) for Recheck.    Niecy Galvan MD

## 2023-05-08 NOTE — PATIENT INSTRUCTIONS
Continue current medications.  Resume the amlodipine 10mg daily.  Monitor blood pressure outside the office. If above 130/80s call or return to office so that we can adjust medication (add back in lisinopril).  Resume the rosuvastatin for your cholesterol.   I would like to recheck and do fasting labs in 3 months.  Follow up with cardiologist and pulmonologist as scheduled.

## 2023-05-09 LAB
ALBUMIN SERPL-MCNC: 4.3 G/DL (ref 3.7–4.7)
ALBUMIN/GLOB SERPL: 1.5 {RATIO} (ref 1.2–2.2)
ALP SERPL-CCNC: 107 IU/L (ref 44–121)
ALT SERPL-CCNC: 23 IU/L (ref 0–44)
AST SERPL-CCNC: 30 IU/L (ref 0–40)
BILIRUB SERPL-MCNC: 0.7 MG/DL (ref 0–1.2)
BUN SERPL-MCNC: 14 MG/DL (ref 8–27)
BUN/CREAT SERPL: 16 (ref 10–24)
CALCIUM SERPL-MCNC: 9.6 MG/DL (ref 8.6–10.2)
CHLORIDE SERPL-SCNC: 99 MMOL/L (ref 96–106)
CO2 SERPL-SCNC: 26 MMOL/L (ref 20–29)
CREAT SERPL-MCNC: 0.9 MG/DL (ref 0.76–1.27)
EGFRCR SERPLBLD CKD-EPI 2021: 91 ML/MIN/1.73
GLOBULIN SER CALC-MCNC: 2.9 G/DL (ref 1.5–4.5)
GLUCOSE SERPL-MCNC: 91 MG/DL (ref 70–99)
POTASSIUM SERPL-SCNC: 4 MMOL/L (ref 3.5–5.2)
PROT SERPL-MCNC: 7.2 G/DL (ref 6–8.5)
SODIUM SERPL-SCNC: 138 MMOL/L (ref 134–144)

## 2023-05-12 ENCOUNTER — TELEPHONE (OUTPATIENT)
Dept: FAMILY MEDICINE CLINIC | Facility: CLINIC | Age: 73
End: 2023-05-12
Payer: MEDICARE

## 2023-05-12 DIAGNOSIS — L03.90 CELLULITIS, UNSPECIFIED CELLULITIS SITE: ICD-10-CM

## 2023-05-12 RX ORDER — FUROSEMIDE 20 MG/1
TABLET ORAL
Qty: 30 TABLET | Refills: 3 | Status: SHIPPED | OUTPATIENT
Start: 2023-05-12

## 2023-05-16 DIAGNOSIS — I48.91 ATRIAL FIBRILLATION, UNSPECIFIED TYPE: ICD-10-CM

## 2023-05-16 RX ORDER — APIXABAN 5 MG/1
TABLET, FILM COATED ORAL
Qty: 60 TABLET | Refills: 0 | Status: SHIPPED | OUTPATIENT
Start: 2023-05-16

## 2023-05-16 NOTE — TELEPHONE ENCOUNTER
Rx Refill Note  Requested Prescriptions     Pending Prescriptions Disp Refills   • Eliquis 5 MG tablet tablet [Pharmacy Med Name: ELIQUIS 5 MG TABLET] 60 tablet 0     Sig: TAKE ONE TABLET BY MOUTH TWICE A DAY      Last office visit with prescribing clinician: 5/8/2023   Next office visit with prescribing clinician: 8/14/2023     Tor Hancock CMA  05/16/23, 09:50 EDT

## 2023-05-23 RX ORDER — ALLOPURINOL 100 MG/1
TABLET ORAL
Qty: 30 TABLET | Refills: 2 | Status: ON HOLD | OUTPATIENT
Start: 2023-05-23

## 2023-05-26 ENCOUNTER — APPOINTMENT (OUTPATIENT)
Dept: GENERAL RADIOLOGY | Facility: HOSPITAL | Age: 73
End: 2023-05-26
Payer: MEDICARE

## 2023-05-26 ENCOUNTER — HOSPITAL ENCOUNTER (INPATIENT)
Facility: HOSPITAL | Age: 73
LOS: 11 days | Discharge: HOME | End: 2023-06-06
Attending: EMERGENCY MEDICINE | Admitting: INTERNAL MEDICINE
Payer: MEDICARE

## 2023-05-26 DIAGNOSIS — J45.901 ASTHMA WITH ACUTE EXACERBATION, UNSPECIFIED ASTHMA SEVERITY, UNSPECIFIED WHETHER PERSISTENT: ICD-10-CM

## 2023-05-26 DIAGNOSIS — Z79.01 ANTICOAGULATED BY ANTICOAGULATION TREATMENT: ICD-10-CM

## 2023-05-26 DIAGNOSIS — J84.9 CHRONIC INTERSTITIAL LUNG DISEASE: ICD-10-CM

## 2023-05-26 DIAGNOSIS — J18.9 HEALTHCARE-ASSOCIATED PNEUMONIA: Primary | ICD-10-CM

## 2023-05-26 DIAGNOSIS — G89.4 CHRONIC PAIN SYNDROME: ICD-10-CM

## 2023-05-26 DIAGNOSIS — I48.0 PAROXYSMAL ATRIAL FIBRILLATION: ICD-10-CM

## 2023-05-26 PROBLEM — I48.91 A-FIB: Status: ACTIVE | Noted: 2023-05-26

## 2023-05-26 PROBLEM — N17.9 AKI (ACUTE KIDNEY INJURY): Status: ACTIVE | Noted: 2023-05-26

## 2023-05-26 LAB
ALBUMIN SERPL-MCNC: 3.5 G/DL (ref 3.5–5.2)
ALBUMIN/GLOB SERPL: 0.9 G/DL
ALP SERPL-CCNC: 153 U/L (ref 39–117)
ALT SERPL W P-5'-P-CCNC: 50 U/L (ref 1–41)
ANION GAP SERPL CALCULATED.3IONS-SCNC: 12 MMOL/L (ref 5–15)
ANISOCYTOSIS BLD QL: ABNORMAL
ARTERIAL PATENCY WRIST A: POSITIVE
AST SERPL-CCNC: 85 U/L (ref 1–40)
ATMOSPHERIC PRESS: 752.9 MMHG
B PARAPERT DNA SPEC QL NAA+PROBE: NOT DETECTED
B PERT DNA SPEC QL NAA+PROBE: NOT DETECTED
BASE EXCESS BLDA CALC-SCNC: 1.3 MMOL/L (ref 0–2)
BDY SITE: ABNORMAL
BILIRUB SERPL-MCNC: 5.7 MG/DL (ref 0–1.2)
BUN SERPL-MCNC: 18 MG/DL (ref 8–23)
BUN/CREAT SERPL: 9.8 (ref 7–25)
C PNEUM DNA NPH QL NAA+NON-PROBE: NOT DETECTED
CALCIUM SPEC-SCNC: 8.9 MG/DL (ref 8.6–10.5)
CHLORIDE SERPL-SCNC: 95 MMOL/L (ref 98–107)
CO2 SERPL-SCNC: 24 MMOL/L (ref 22–29)
CREAT SERPL-MCNC: 1.83 MG/DL (ref 0.76–1.27)
D-LACTATE SERPL-SCNC: 2 MMOL/L (ref 0.5–2)
D-LACTATE SERPL-SCNC: 2.8 MMOL/L (ref 0.5–2)
DEPRECATED RDW RBC AUTO: 47.5 FL (ref 37–54)
EGFRCR SERPLBLD CKD-EPI 2021: 38.7 ML/MIN/1.73
EOSINOPHIL # BLD MANUAL: 0.27 10*3/MM3 (ref 0–0.4)
EOSINOPHIL NFR BLD MANUAL: 1.2 % (ref 0.3–6.2)
ERYTHROCYTE [DISTWIDTH] IN BLOOD BY AUTOMATED COUNT: 14 % (ref 12.3–15.4)
FLUAV SUBTYP SPEC NAA+PROBE: NOT DETECTED
FLUBV RNA ISLT QL NAA+PROBE: NOT DETECTED
GAS FLOW AIRWAY: 1 LPM
GLOBULIN UR ELPH-MCNC: 3.9 GM/DL
GLUCOSE SERPL-MCNC: 95 MG/DL (ref 65–99)
HADV DNA SPEC NAA+PROBE: NOT DETECTED
HCO3 BLDA-SCNC: 24.4 MMOL/L (ref 22–28)
HCOV 229E RNA SPEC QL NAA+PROBE: NOT DETECTED
HCOV HKU1 RNA SPEC QL NAA+PROBE: NOT DETECTED
HCOV NL63 RNA SPEC QL NAA+PROBE: NOT DETECTED
HCOV OC43 RNA SPEC QL NAA+PROBE: NOT DETECTED
HCT VFR BLD AUTO: 40.2 % (ref 37.5–51)
HGB BLD-MCNC: 13.7 G/DL (ref 13–17.7)
HMPV RNA NPH QL NAA+NON-PROBE: NOT DETECTED
HPIV1 RNA ISLT QL NAA+PROBE: NOT DETECTED
HPIV2 RNA SPEC QL NAA+PROBE: NOT DETECTED
HPIV3 RNA NPH QL NAA+PROBE: NOT DETECTED
HPIV4 P GENE NPH QL NAA+PROBE: NOT DETECTED
LYMPHOCYTES # BLD MANUAL: 1.85 10*3/MM3 (ref 0.7–3.1)
LYMPHOCYTES NFR BLD MANUAL: 8.2 % (ref 5–12)
M PNEUMO IGG SER IA-ACNC: NOT DETECTED
MACROCYTES BLD QL SMEAR: ABNORMAL
MCH RBC QN AUTO: 32 PG (ref 26.6–33)
MCHC RBC AUTO-ENTMCNC: 34.1 G/DL (ref 31.5–35.7)
MCV RBC AUTO: 93.9 FL (ref 79–97)
MODALITY: ABNORMAL
MONOCYTES # BLD: 1.85 10*3/MM3 (ref 0.1–0.9)
NEUTROPHILS # BLD AUTO: 18.56 10*3/MM3 (ref 1.7–7)
NEUTROPHILS NFR BLD MANUAL: 82.4 % (ref 42.7–76)
PCO2 BLDA: 33.7 MM HG (ref 35–45)
PH BLDA: 7.47 PH UNITS (ref 7.35–7.45)
PLAT MORPH BLD: NORMAL
PLATELET # BLD AUTO: 266 10*3/MM3 (ref 140–450)
PMV BLD AUTO: 10.6 FL (ref 6–12)
PO2 BLDA: 50.9 MM HG (ref 80–100)
POTASSIUM SERPL-SCNC: 4.1 MMOL/L (ref 3.5–5.2)
PROCALCITONIN SERPL-MCNC: 9.19 NG/ML (ref 0–0.25)
PROT SERPL-MCNC: 7.4 G/DL (ref 6–8.5)
RBC # BLD AUTO: 4.28 10*6/MM3 (ref 4.14–5.8)
RHINOVIRUS RNA SPEC NAA+PROBE: NOT DETECTED
RSV RNA NPH QL NAA+NON-PROBE: NOT DETECTED
SAO2 % BLDCOA: 88.2 % (ref 92–99)
SARS-COV-2 RNA NPH QL NAA+NON-PROBE: NOT DETECTED
SMUDGE CELLS BLD QL SMEAR: ABNORMAL
SODIUM SERPL-SCNC: 131 MMOL/L (ref 136–145)
TOTAL RATE: 18 BREATHS/MINUTE
VARIANT LYMPHS NFR BLD MANUAL: 8.2 % (ref 19.6–45.3)
WBC NRBC COR # BLD: 22.52 10*3/MM3 (ref 3.4–10.8)

## 2023-05-26 PROCEDURE — 87040 BLOOD CULTURE FOR BACTERIA: CPT | Performed by: EMERGENCY MEDICINE

## 2023-05-26 PROCEDURE — 94664 DEMO&/EVAL PT USE INHALER: CPT

## 2023-05-26 PROCEDURE — 36600 WITHDRAWAL OF ARTERIAL BLOOD: CPT

## 2023-05-26 PROCEDURE — 93005 ELECTROCARDIOGRAM TRACING: CPT

## 2023-05-26 PROCEDURE — 94799 UNLISTED PULMONARY SVC/PX: CPT

## 2023-05-26 PROCEDURE — 25010000002 VANCOMYCIN 10 G RECONSTITUTED SOLUTION: Performed by: EMERGENCY MEDICINE

## 2023-05-26 PROCEDURE — 80053 COMPREHEN METABOLIC PANEL: CPT | Performed by: EMERGENCY MEDICINE

## 2023-05-26 PROCEDURE — 85007 BL SMEAR W/DIFF WBC COUNT: CPT | Performed by: EMERGENCY MEDICINE

## 2023-05-26 PROCEDURE — 71045 X-RAY EXAM CHEST 1 VIEW: CPT

## 2023-05-26 PROCEDURE — 82803 BLOOD GASES ANY COMBINATION: CPT

## 2023-05-26 PROCEDURE — 94761 N-INVAS EAR/PLS OXIMETRY MLT: CPT

## 2023-05-26 PROCEDURE — 87205 SMEAR GRAM STAIN: CPT | Performed by: EMERGENCY MEDICINE

## 2023-05-26 PROCEDURE — 87070 CULTURE OTHR SPECIMN AEROBIC: CPT | Performed by: EMERGENCY MEDICINE

## 2023-05-26 PROCEDURE — 0202U NFCT DS 22 TRGT SARS-COV-2: CPT | Performed by: INTERNAL MEDICINE

## 2023-05-26 PROCEDURE — 85025 COMPLETE CBC W/AUTO DIFF WBC: CPT | Performed by: EMERGENCY MEDICINE

## 2023-05-26 PROCEDURE — 83605 ASSAY OF LACTIC ACID: CPT | Performed by: EMERGENCY MEDICINE

## 2023-05-26 PROCEDURE — 0 CEFEPIME PER 500 MG: Performed by: EMERGENCY MEDICINE

## 2023-05-26 PROCEDURE — 93010 ELECTROCARDIOGRAM REPORT: CPT | Performed by: INTERNAL MEDICINE

## 2023-05-26 PROCEDURE — 84145 PROCALCITONIN (PCT): CPT | Performed by: EMERGENCY MEDICINE

## 2023-05-26 PROCEDURE — 99285 EMERGENCY DEPT VISIT HI MDM: CPT

## 2023-05-26 PROCEDURE — 94640 AIRWAY INHALATION TREATMENT: CPT

## 2023-05-26 PROCEDURE — 25010000002 METHYLPREDNISOLONE PER 125 MG: Performed by: INTERNAL MEDICINE

## 2023-05-26 RX ORDER — IPRATROPIUM BROMIDE AND ALBUTEROL SULFATE 2.5; .5 MG/3ML; MG/3ML
3 SOLUTION RESPIRATORY (INHALATION)
Status: DISCONTINUED | OUTPATIENT
Start: 2023-05-26 | End: 2023-06-06 | Stop reason: HOSPADM

## 2023-05-26 RX ORDER — POLYETHYLENE GLYCOL 3350 17 G/17G
17 POWDER, FOR SOLUTION ORAL DAILY PRN
Status: DISCONTINUED | OUTPATIENT
Start: 2023-05-26 | End: 2023-06-06 | Stop reason: HOSPADM

## 2023-05-26 RX ORDER — PREDNISONE 20 MG/1
40 TABLET ORAL
Status: DISCONTINUED | OUTPATIENT
Start: 2023-05-27 | End: 2023-05-28

## 2023-05-26 RX ORDER — BISACODYL 5 MG/1
5 TABLET, DELAYED RELEASE ORAL DAILY PRN
Status: DISCONTINUED | OUTPATIENT
Start: 2023-05-26 | End: 2023-06-06 | Stop reason: HOSPADM

## 2023-05-26 RX ORDER — SODIUM CHLORIDE 0.9 % (FLUSH) 0.9 %
10 SYRINGE (ML) INJECTION EVERY 12 HOURS SCHEDULED
Status: DISCONTINUED | OUTPATIENT
Start: 2023-05-27 | End: 2023-06-06 | Stop reason: HOSPADM

## 2023-05-26 RX ORDER — ACETAMINOPHEN 160 MG/5ML
650 SOLUTION ORAL EVERY 4 HOURS PRN
Status: DISCONTINUED | OUTPATIENT
Start: 2023-05-26 | End: 2023-06-06 | Stop reason: HOSPADM

## 2023-05-26 RX ORDER — AMOXICILLIN 250 MG
2 CAPSULE ORAL 2 TIMES DAILY
Status: DISCONTINUED | OUTPATIENT
Start: 2023-05-27 | End: 2023-06-06 | Stop reason: HOSPADM

## 2023-05-26 RX ORDER — METHYLPREDNISOLONE SODIUM SUCCINATE 125 MG/2ML
125 INJECTION, POWDER, LYOPHILIZED, FOR SOLUTION INTRAMUSCULAR; INTRAVENOUS ONCE
Status: COMPLETED | OUTPATIENT
Start: 2023-05-26 | End: 2023-05-26

## 2023-05-26 RX ORDER — ONDANSETRON 2 MG/ML
4 INJECTION INTRAMUSCULAR; INTRAVENOUS EVERY 6 HOURS PRN
Status: DISCONTINUED | OUTPATIENT
Start: 2023-05-26 | End: 2023-06-06 | Stop reason: HOSPADM

## 2023-05-26 RX ORDER — NITROGLYCERIN 0.4 MG/1
0.4 TABLET SUBLINGUAL
Status: DISCONTINUED | OUTPATIENT
Start: 2023-05-26 | End: 2023-06-06 | Stop reason: HOSPADM

## 2023-05-26 RX ORDER — VANCOMYCIN 2 GRAM/500 ML IN 0.9 % SODIUM CHLORIDE INTRAVENOUS
20 ONCE
Status: COMPLETED | OUTPATIENT
Start: 2023-05-26 | End: 2023-05-26

## 2023-05-26 RX ORDER — ACETAMINOPHEN 325 MG/1
650 TABLET ORAL EVERY 4 HOURS PRN
Status: DISCONTINUED | OUTPATIENT
Start: 2023-05-26 | End: 2023-06-06 | Stop reason: HOSPADM

## 2023-05-26 RX ORDER — BISACODYL 10 MG
10 SUPPOSITORY, RECTAL RECTAL DAILY PRN
Status: DISCONTINUED | OUTPATIENT
Start: 2023-05-26 | End: 2023-06-06 | Stop reason: HOSPADM

## 2023-05-26 RX ORDER — SODIUM CHLORIDE 0.9 % (FLUSH) 0.9 %
10 SYRINGE (ML) INJECTION AS NEEDED
Status: DISCONTINUED | OUTPATIENT
Start: 2023-05-26 | End: 2023-06-06 | Stop reason: HOSPADM

## 2023-05-26 RX ORDER — SODIUM CHLORIDE 9 MG/ML
40 INJECTION, SOLUTION INTRAVENOUS AS NEEDED
Status: DISCONTINUED | OUTPATIENT
Start: 2023-05-26 | End: 2023-06-06 | Stop reason: HOSPADM

## 2023-05-26 RX ORDER — ACETAMINOPHEN 650 MG/1
650 SUPPOSITORY RECTAL EVERY 4 HOURS PRN
Status: DISCONTINUED | OUTPATIENT
Start: 2023-05-26 | End: 2023-06-06 | Stop reason: HOSPADM

## 2023-05-26 RX ADMIN — IPRATROPIUM BROMIDE AND ALBUTEROL SULFATE 3 ML: 2.5; .5 SOLUTION RESPIRATORY (INHALATION) at 22:42

## 2023-05-26 RX ADMIN — SODIUM CHLORIDE 1000 ML: 9 INJECTION, SOLUTION INTRAVENOUS at 16:36

## 2023-05-26 RX ADMIN — VANCOMYCIN HYDROCHLORIDE 2000 MG: 10 INJECTION, POWDER, LYOPHILIZED, FOR SOLUTION INTRAVENOUS at 17:27

## 2023-05-26 RX ADMIN — METHYLPREDNISOLONE SODIUM SUCCINATE 125 MG: 125 INJECTION, POWDER, FOR SOLUTION INTRAMUSCULAR; INTRAVENOUS at 16:35

## 2023-05-26 RX ADMIN — IPRATROPIUM BROMIDE AND ALBUTEROL SULFATE 3 ML: 2.5; .5 SOLUTION RESPIRATORY (INHALATION) at 16:22

## 2023-05-26 RX ADMIN — Medication 10 ML: at 23:28

## 2023-05-26 RX ADMIN — CEFEPIME 2 G: 2 INJECTION, POWDER, FOR SOLUTION INTRAVENOUS at 16:35

## 2023-05-26 NOTE — ED NOTES
".Nursing report ED to floor  Orion Casiano  72 y.o.  male    HPI :   Chief Complaint   Patient presents with    Weakness - Generalized    Shortness of Breath       Admitting doctor:   Daniel Kaur MD    Admitting diagnosis:   The primary encounter diagnosis was Healthcare-associated pneumonia. Diagnoses of Chronic interstitial lung disease, Paroxysmal atrial fibrillation, and Anticoagulated by anticoagulation treatment were also pertinent to this visit.    Code status:   Current Code Status       Date Active Code Status Order ID Comments User Context       Prior            Allergies:   Ibuprofen and Aspirin    Isolation:   No active isolations    Intake and Output  No intake or output data in the 24 hours ending 05/26/23 1528    Weight:       05/26/23  1339   Weight: 103 kg (228 lb)       Most recent vitals:   Vitals:    05/26/23 1339 05/26/23 1341 05/26/23 1402 05/26/23 1435   BP: 126/72 126/72     Pulse:  110 105 110   Resp:   26    Temp:       TempSrc:       SpO2:  99%  95%   Weight: 103 kg (228 lb)      Height: 186.7 cm (73.5\")          Active LDAs/IV Access:   Lines, Drains & Airways       Active LDAs       Name Placement date Placement time Site Days    Peripheral IV 05/26/23 1431 Right Antecubital 05/26/23  1431  Antecubital  less than 1                    Labs (abnormal labs have a star):   Labs Reviewed   LACTIC ACID, PLASMA - Abnormal; Notable for the following components:       Result Value    Lactate 2.8 (*)     All other components within normal limits   CBC WITH AUTO DIFFERENTIAL - Abnormal; Notable for the following components:    WBC 22.52 (*)     All other components within normal limits   MANUAL DIFFERENTIAL - Abnormal; Notable for the following components:    Neutrophil % 82.4 (*)     Lymphocyte % 8.2 (*)     Neutrophils Absolute 18.56 (*)     Monocytes Absolute 1.85 (*)     All other components within normal limits   BLOOD CULTURE   BLOOD CULTURE   RESPIRATORY CULTURE   BLOOD GAS, ARTERIAL "   COMPREHENSIVE METABOLIC PANEL   PROCALCITONIN   LACTIC ACID, REFLEX   CBC AND DIFFERENTIAL    Narrative:     The following orders were created for panel order CBC & Differential.  Procedure                               Abnormality         Status                     ---------                               -----------         ------                     CBC Auto Differential[[id number]]        Abnormal            Final result                 Please view results for these tests on the individual orders.       EKG:   ECG 12 Lead Dyspnea    (Results Pending)       Meds given in ED:   Medications   cefepime 2 gm IVPB in 100 ml NS (VTB) (has no administration in time range)   vancomycin IVPB 2000 mg in 0.9% Sodium Chloride (premix) 500 mL (has no administration in time range)       Imaging results:  No radiology results for the last day    Ambulatory status:   - BR    Social issues:   Social History     Socioeconomic History    Marital status:    Tobacco Use    Smoking status: Never    Smokeless tobacco: Never   Vaping Use    Vaping Use: Never used   Substance and Sexual Activity    Alcohol use: Yes     Comment: occassionally    Drug use: Never    Sexual activity: Defer         Jt Swanson RN  05/26/23 15:28 EDT

## 2023-05-26 NOTE — ED PROVIDER NOTES
EMERGENCY DEPARTMENT ENCOUNTER    Room Number:  34/34  Date seen:  5/26/2023  PCP: Niecy Galvan MD  Historian: Patient, son at bedside      HPI:  Chief Complaint: Cough, shortness of breath  A complete HPI/ROS/PMH/PSH/SH/FH are unobtainable due to:   Context: Orion Casiano is a 72 y.o. male who presents to the ED c/o cough, shortness of breath.  Patient states has been more short of breath than usual starting about Tuesday.  He has had cough which is occasionally productive of orangeish colored sputum which is unusual for him.  He does report low-grade fever at home.  He reports soreness across the chest with coughing.  Patient is compliant with medications including Eliquis for anticoagulation as well as inhalers and Imuran and prednisone for inflammatory lung disease.      MEDICAL RECORD REVIEW (non ED)  I reviewed prior medical records including hospitalization from February of this year.  Patient has history of inflammatory lung disease and is followed by Dr. Justin Martinez.  He has been treated with Imuran and prednisone.  He does have history of severe pulmonary hypertension.  During that hospitalization he did develop atrial fibrillation and was started on Eliquis for anticoagulation.    PAST MEDICAL HISTORY  Active Ambulatory Problems     Diagnosis Date Noted   • Adenomatous polyp of colon 01/08/2020   • Asthma 05/30/2015   • Chronic pain 02/24/2023   • Mixed hyperlipidemia 02/24/2023   • Primary hypertension 02/24/2023   • Lumbar radiculopathy 02/24/2023   • Acute respiratory failure with hypoxia 02/24/2023   • Chronic interstitial lung disease 02/24/2023   • Elevated troponin 02/24/2023   • New onset atrial fibrillation 02/24/2023   • History of adenomatous polyp of colon 03/24/2023   • Hyperglycemia 05/08/2023     Resolved Ambulatory Problems     Diagnosis Date Noted   • Hyponatremia 02/24/2023     Past Medical History:   Diagnosis Date   • Acute bronchitis    • Allergic rhinitis    •  Bacterial pneumonia    • Bilateral lower leg cellulitis    • Colon polyp    • Cutaneous abscess of left lower limb    • Deficiency of other specified B group vitamins    • Dermatitis    • Disc disorder of lumbar region    • Dysuria    • Effusion, right hand    • Essential (primary) hypertension    • Frequency of micturition    • Gout    • Hematuria    • Hyperlipidemia    • Hypertension    • ILD (interstitial lung disease)    • Insomnia    • Localized swelling, mass and lump, unspecified    • Low back pain    • Lumbago    • Microscopic hematuria    • Pain in left knee    • Sleep disturbances    • Vertigo    • Vitamin B12 deficiency          PAST SURGICAL HISTORY  Past Surgical History:   Procedure Laterality Date   • BRONCHOSCOPY  02/27/2023    Procedure: BRONCHOSCOPY WITH FLUORO, BAL, AND BIOPSIES AND ENDOBRONCHIAL ULTRASOUND WITH FNA;  Surgeon: Rubens Sheets MD;  Location: [facility] ENDOSCOPY;  Service: Pulmonary;;  INTERSTITIAL LUNG DISEASE   • CARDIAC CATHETERIZATION N/A 02/24/2023    Procedure: Right Heart Cath;  Surgeon: Ravi Fonseca MD;  Location: [facility] CATH INVASIVE LOCATION;  Service: Cardiovascular;  Laterality: N/A;   • COLONOSCOPY      maybe 2016   • COLONOSCOPY N/A 03/11/2020    Procedure: COLONOSCOPY TO CECUM AND TI WITH COLD AND HOT SNARE AND POLYPECTOMIES;  Surgeon: Patti Navarro MD;  Location: [facility] ENDOSCOPY;  Service: Gastroenterology;  Laterality: N/A;  PRE: H/O COLON POLYPS  POST: POLYPS, HEMORRHOIDS, DIVERTICULOSIS   • LUMBAR DISC SURGERY      2008   • NOSE SURGERY           FAMILY HISTORY  Family History   Problem Relation Age of Onset   • Coronary artery disease Father          SOCIAL HISTORY  Social History     Socioeconomic History   • Marital status:    Tobacco Use   • Smoking status: Never   • Smokeless tobacco: Never   Vaping Use   • Vaping Use: Never used   Substance and Sexual Activity   • Alcohol use: Yes     Comment: occassionally   • Drug use: Never   • Sexual  activity: Defer         ALLERGIES  Ibuprofen and Aspirin        REVIEW OF SYSTEMS  Review of Systems   Constitutional: Positive for fatigue and fever.   Respiratory: Positive for cough and shortness of breath.    Cardiovascular: Negative for chest pain.   Musculoskeletal: Positive for arthralgias.   All other systems reviewed and are negative.           PHYSICAL EXAM  ED Triage Vitals   Temp Pulse Resp BP SpO2   05/26/23 1316 -- -- -- 05/26/23 1329   (!) 100.7 °F (38.2 °C)    99 %      Temp src Heart Rate Source Patient Position BP Location FiO2 (%)   05/26/23 1316 -- -- -- --   Temporal           Physical Exam    GENERAL: Alert and pleasant male in no obvious distress.  Triage vitals reviewed notable for elevated temp of 100.7.  O2 sats 99% on 2 L nasal cannula  HENT: nares patent  EYES: no scleral icterus  CV: regular rhythm, regular rate-no murmur  RESPIRATORY: Mildly tachypneic, decreased breath sounds bilaterally-O2 sats upper 90s on 2 L nasal cannula  ABDOMEN: soft, nontender to palpation  MUSCULOSKELETAL: no deformity-no significant swelling or tenderness to palpation  NEURO: Strength sensation and coordination are grossly intact.  Speech and mentation are unremarkable  SKIN: warm, dry      Vital signs and nursing notes reviewed.          LAB RESULTS  Recent Results (from the past 24 hour(s))   Comprehensive Metabolic Panel    Collection Time: 05/26/23  2:29 PM    Specimen: Blood   Result Value Ref Range    Glucose 95 65 - 99 mg/dL    BUN 18 8 - 23 mg/dL    Creatinine 1.83 (H) 0.76 - 1.27 mg/dL    Sodium 131 (L) 136 - 145 mmol/L    Potassium 4.1 3.5 - 5.2 mmol/L    Chloride 95 (L) 98 - 107 mmol/L    CO2 24.0 22.0 - 29.0 mmol/L    Calcium 8.9 8.6 - 10.5 mg/dL    Total Protein 7.4 6.0 - 8.5 g/dL    Albumin 3.5 3.5 - 5.2 g/dL    ALT (SGPT) 50 (H) 1 - 41 U/L    AST (SGOT) 85 (H) 1 - 40 U/L    Alkaline Phosphatase 153 (H) 39 - 117 U/L    Total Bilirubin 5.7 (H) 0.0 - 1.2 mg/dL    Globulin 3.9 gm/dL    A/G Ratio  0.9 g/dL    BUN/Creatinine Ratio 9.8 7.0 - 25.0    Anion Gap 12.0 5.0 - 15.0 mmol/L    eGFR 38.7 (L) >60.0 mL/min/1.73   Lactic Acid, Plasma    Collection Time: 05/26/23  2:29 PM    Specimen: Blood   Result Value Ref Range    Lactate 2.8 (C) 0.5 - 2.0 mmol/L   Procalcitonin    Collection Time: 05/26/23  2:29 PM    Specimen: Blood   Result Value Ref Range    Procalcitonin 9.19 (H) 0.00 - 0.25 ng/mL   CBC Auto Differential    Collection Time: 05/26/23  2:29 PM    Specimen: Blood   Result Value Ref Range    WBC 22.52 (H) 3.40 - 10.80 10*3/mm3    RBC 4.28 4.14 - 5.80 10*6/mm3    Hemoglobin 13.7 13.0 - 17.7 g/dL    Hematocrit 40.2 37.5 - 51.0 %    MCV 93.9 79.0 - 97.0 fL    MCH 32.0 26.6 - 33.0 pg    MCHC 34.1 31.5 - 35.7 g/dL    RDW 14.0 12.3 - 15.4 %    RDW-SD 47.5 37.0 - 54.0 fl    MPV 10.6 6.0 - 12.0 fL    Platelets 266 140 - 450 10*3/mm3   Manual Differential    Collection Time: 05/26/23  2:29 PM    Specimen: Blood   Result Value Ref Range    Neutrophil % 82.4 (H) 42.7 - 76.0 %    Lymphocyte % 8.2 (L) 19.6 - 45.3 %    Monocyte % 8.2 5.0 - 12.0 %    Eosinophil % 1.2 0.3 - 6.2 %    Neutrophils Absolute 18.56 (H) 1.70 - 7.00 10*3/mm3    Lymphocytes Absolute 1.85 0.70 - 3.10 10*3/mm3    Monocytes Absolute 1.85 (H) 0.10 - 0.90 10*3/mm3    Eosinophils Absolute 0.27 0.00 - 0.40 10*3/mm3    Anisocytosis Mod/2+ None Seen    Macrocytes Mod/2+ None Seen    Smudge Cells Large/3+ None Seen    Platelet Morphology Normal Normal   Blood Gas, Arterial -    Collection Time: 05/26/23  3:26 PM    Specimen: Arterial Blood   Result Value Ref Range    Site Arterial: right radial     Tito's Test Positive     pH, Arterial 7.468 (H) 7.350 - 7.450 pH units    pCO2, Arterial 33.7 (L) 35.0 - 45.0 mm Hg    pO2, Arterial 50.9 (C) 80.0 - 100.0 mm Hg    HCO3, Arterial 24.4 22.0 - 28.0 mmol/L    Base Excess, Arterial 1.3 0.0 - 2.0 mmol/L    O2 Saturation Calculated 88.2 (L) 92.0 - 99.0 %    Barometric Pressure for Blood Gas 752.9 mmHg    Modality  Cannula     Flow Rate 1 lpm    Rate 18 Breaths/minute       Ordered the above labs and independently interpreted results. My findings will be discussed in the medical decision making section below        RADIOLOGY  XR Chest 1 View    Result Date: 5/26/2023  XR CHEST 1 VW-  HISTORY:  Fever, shortness of breath.  COMPARISON:  Chest radiograph 02/25/2023  FINDINGS:  A single view of the chest was obtained. The cardiac silhouette and mediastinal and hilar contours are not significantly changed. There are low lung volumes. There are diffuse airspace opacities throughout the right lung, progressed from 02/25/2023, concerning for pneumonia in the appropriate clinical setting. Short-term follow-up imaging is recommended. There is no large pleural effusion. There is incompletely assessed fusion hardware in the cervical spine.  This report was finalized on 5/26/2023 2:55 PM by Dr. Caryn Frost M.D.        I ordered and independently reviewed the above noted radiographic studies.      I viewed images of chest x-ray which showed worsening right-sided opacities per my independent interpretation.    See radiologist's dictation for official interpretation.             PROCEDURES  Critical Care  Performed by: Brandin Brenner MD  Authorized by: Brandin Brenner MD     Critical care provider statement:     Critical care time (minutes):  35    Critical care was necessary to treat or prevent imminent or life-threatening deterioration of the following conditions:  Respiratory failure    Critical care was time spent personally by me on the following activities:  Ordering and review of laboratory studies, ordering and review of radiographic studies, pulse oximetry, re-evaluation of patient's condition, review of old charts, evaluation of patient's response to treatment, discussions with consultants, development of treatment plan with patient or surrogate, obtaining history from patient or surrogate and examination of  patient              MEDICATIONS GIVEN IN ER  Medications   cefepime 2 gm IVPB in 100 ml NS (VTB) (has no administration in time range)   vancomycin IVPB 2000 mg in 0.9% Sodium Chloride (premix) 500 mL (has no administration in time range)   sodium chloride 0.9 % bolus 1,000 mL (has no administration in time range)   ipratropium-albuterol (DUO-NEB) nebulizer solution 3 mL (has no administration in time range)   methylPREDNISolone sodium succinate (SOLU-Medrol) injection 125 mg (has no administration in time range)   predniSONE (DELTASONE) tablet 40 mg (has no administration in time range)               MEDICAL DECISION MAKING, PROGRESS, and CONSULTS    All labs have been independently reviewed by me.  All radiology studies have been reviewed by me and I have also reviewed the radiology report.   EKG's independently viewed and interpreted by me.  Discussion below represents my analysis of pertinent findings related to patient's condition, differential diagnosis, treatment plan and final disposition.      Additional sources:  - Discussed/ obtained information from independent historians: Son at bedside, EMS    - External (non-ED) record review: Please see documented above    - Chronic or social conditions impacting care: Chronic interstitial lung disease, atrial fibrillation, immunosuppression on Imuran and prednisone    - Shared decision making: I discussed ED evaluation and treatment plan with patient who is in agreement.  Complicated patient with increased shortness of breath.  He is febrile and x-ray suggests possible acute bacterial infection.    We will cover with cefepime and vancomycin.  Will admit to the hospital with pulmonary consultation.      Orders placed during this visit:  Orders Placed This Encounter   Procedures   • Critical Care   • Blood Culture - Blood,   • Blood Culture - Blood,   • Respiratory Culture - Sputum, Cough   • Respiratory Panel PCR w/COVID-19(SARS-CoV-2) SURAJ/ZEUS/ENDY/PAD/COR/MAD/RADHA  In-House, NP Swab in UTM/[facility], 3-4 HR TAT - Swab, Nasopharynx   • XR Chest 1 View   • Comprehensive Metabolic Panel   • Lactic Acid, Plasma   • Procalcitonin   • CBC Auto Differential   • Manual Differential   • STAT Lactic Acid, Reflex   • Blood Gas, Arterial -   • Blood Gas, Arterial -   • [facility] (on-call MD unless specified) Details   • Pulmonology (on-call MD unless specified)   • ECG 12 Lead Dyspnea   • Inpatient Admission   • CBC & Differential           Differential diagnosis:    Please see as documented below in ED course      Independent interpretation of labs, radiology studies, and discussions with consultants:  ED Course as of 05/26/23 1600   Fri May 26, 2023   1341 EKG independently interpreted by me    Time 1:38 PM    Sinus tachycardia 107  Normal P waves and WA intervals  QRS-normal axis, unremarkable QRS  ST, T waves-nonspecific changes    When compared to 2/2023, A-fib is no longer present [DB]   0940 [id number]-old male with history of inflammatory lung disease, recent A-fib presents with worsening shortness of breath and productive cough.    On exam he is relatively well-appearing but febrile with temp of 100.7.  O2 sats upper 90s on 2 L nasal cannula.  He is mildly tachypneic but I do not hear Rales or obvious infiltrate on his pulmonary exam.    Assessment-most likely dealing with upper respiratory infection in a patient with severe lung disease.  Would consider heart failure or dysrhythmia in a patient with recent A-fib.  Certainly could consider hepatic or renal disease given his comorbidities and age.  Consider anemia given his anticoagulation status.    Most likely we are dealing however with a respiratory infection and will get viral panel, blood cultures as well as procalcitonin and lactic acid.  Anticipate likely admission given patient's fever and comorbidities. [DB]   1507 Chest x-ray independently interpreted by me shows evidence of cardiomegaly.  There is diffuse interstitial opacities  right greater than left concerning for possible pneumonia.    Official radiology interpretation is in agreement with this assessment. [DB]   1515 I spoke with Dr. Kaur will admit on behalf of [facility].  He did request that we do an ABG which we will go ahead and accomplish. [DB]   1516 Patient with elevated white blood cell count lactic acid suggestive of pneumonia.  We will go ahead and cover with Vanco and cefepime given patient's multiple comorbidities. [DB]   1523 I discussed treatment and evaluation this patient with Dr. Rolando León who will see in consultation from pulmonary service. [DB]      ED Course User Index  [DB] Brandin Brenner MD             I used full protective equipment while examining this patient.  This includes face mask, gloves and protective eyewear.  I washed my hands before entering the room and immediately upon leaving the room    DIAGNOSIS  Final diagnoses:   Healthcare-associated pneumonia   Chronic interstitial lung disease   Paroxysmal atrial fibrillation   Anticoagulated by anticoagulation treatment         DISPOSITION  Admission            Latest Documented Vital Signs:  As of 16:00 EDT  BP- 126/72 HR- 109 Temp- (!) 100.7 °F (38.2 °C) (Temporal) O2 sat- 99%              --    Please note that portions of this were completed with a voice recognition program.       Note Disclaimer: At [facility], we believe that sharing information builds trust and better relationships. You are receiving this note because you are receiving care at [facility] or recently visited. It is possible you will see health information before a provider has talked with you about it. This kind of information can be easy to misunderstand. To help you fully understand what it means for your health, we urge you to discuss this note with your provider.           Brandin Brenner MD  05/26/23 1600

## 2023-05-26 NOTE — H&P
Internal medicine history and physical  INTERNAL MEDICINE   Psychiatric       Patient Identification:  Name: Orion Casiano  Age: 72 y.o.  Sex: male  :  1950  MRN: [id number]                   Primary Care Physician: Niecy Galvan MD                               Date of admission:2023    Chief Complaint: Worsening cough and shortness of breath since Tuesday    History of Present Illness:   Patient is a 72-year-old male with complicated past medical history including history of interstitial lung disease, chronic back pain, asthma, hypertension who is on ongoing treatment with prednisone and methotrexate and Imuran for his lung disease and follows with Dr. Martinez, has also history of atrial fibrillation on anticoagulation therapy since February of this year was in his usual state of his health with progressive shortness of breath fatigue and weakness until Tuesday when his symptoms got worse with discomfort in the chest getting worse with coughing and taking deep breaths.  He was running low-grade temperature and was producing orangeish greenish phlegm with coughing.  Because of his fatigue weakness and fever patient decided to come to the emergency room for further evaluation where work-up revealed worsening right-sided infiltrate in the setting of interstitial lung disease.  Patient was noted to have hypoxia on his ABG.  Work-up in the emergency room also included respiratory viral panel which came back negative.  Sputum culture and blood cultures have been sent patient was empirically started on a dose of cefepime and vancomycin for suspected healthcare associated pneumonia and pulmonary service was consulted.  He has seen the patient in the emergency room and recommended higher dose of steroid while he is recovering from his underlying asthma exacerbation associated with pneumonia.  They recommended de-escalation of antibiotics to ceftriaxone and Zithromax.  After  nebulizer treatment and steroids and antibiotics patient is feeling somewhat better.      Past Medical History:  Past Medical History:   Diagnosis Date   • Acute bronchitis    • Adenomatous polyp of colon 01/08/2020    Added automatically from request for surgery [id number]   • Allergic rhinitis    • Asthma    • Bacterial pneumonia    • Bilateral lower leg cellulitis    • Colon polyp    • Cutaneous abscess of left lower limb    • Deficiency of other specified B group vitamins    • Dermatitis    • Disc disorder of lumbar region     worse at L4-5 with protusion/herniation   • Dysuria    • Effusion, right hand     resolved   • Essential (primary) hypertension    • Frequency of micturition    • Gout    • Hematuria    • Hyperglycemia    • Hyperlipidemia    • Hypertension    • ILD (interstitial lung disease)    • Insomnia    • Localized swelling, mass and lump, unspecified    • Low back pain    • Lumbago    • Microscopic hematuria    • Pain in left knee    • Sleep disturbances    • Vertigo     resolved   • Vitamin B12 deficiency      Past Surgical History:  Past Surgical History:   Procedure Laterality Date   • BRONCHOSCOPY  02/27/2023    Procedure: BRONCHOSCOPY WITH FLUORO, BAL, AND BIOPSIES AND ENDOBRONCHIAL ULTRASOUND WITH FNA;  Surgeon: Rubens Sheets MD;  Location: [facility] ENDOSCOPY;  Service: Pulmonary;;  INTERSTITIAL LUNG DISEASE   • CARDIAC CATHETERIZATION N/A 02/24/2023    Procedure: Right Heart Cath;  Surgeon: Ravi Fonseca MD;  Location: [facility] CATH INVASIVE LOCATION;  Service: Cardiovascular;  Laterality: N/A;   • COLONOSCOPY      maybe 2016   • COLONOSCOPY N/A 03/11/2020    Procedure: COLONOSCOPY TO CECUM AND TI WITH COLD AND HOT SNARE AND POLYPECTOMIES;  Surgeon: Patti Navarro MD;  Location: [facility] ENDOSCOPY;  Service: Gastroenterology;  Laterality: N/A;  PRE: H/O COLON POLYPS  POST: POLYPS, HEMORRHOIDS, DIVERTICULOSIS   • LUMBAR DISC SURGERY      2008   • NOSE SURGERY        Home Meds:  (Not in a  hospital admission)    Current Meds:     Current Facility-Administered Medications:   •  ipratropium-albuterol (DUO-NEB) nebulizer solution 3 mL, 3 mL, Nebulization, 4x Daily - RT, Rubens Sheets MD, 3 mL at 05/26/23 1622  •  [START ON 5/27/2023] predniSONE (DELTASONE) tablet 40 mg, 40 mg, Oral, Daily With Breakfast, Rubens Sheets MD    Current Outpatient Medications:   •  albuterol sulfate  (90 Base) MCG/ACT inhaler, Inhale 2 puffs Every 6 (Six) Hours As Needed for Wheezing., Disp: 8 g, Rfl: 0  •  allopurinol (ZYLOPRIM) 100 MG tablet, TAKE ONE TABLET BY MOUTH DAILY, Disp: 30 tablet, Rfl: 2  •  amLODIPine (NORVASC) 10 MG tablet, Take  by mouth Daily. (Patient not taking: Reported on 5/8/2023), Disp: , Rfl:   •  azaTHIOprine (IMURAN) 50 MG tablet, Take  by mouth Daily., Disp: , Rfl:   •  Azelastine HCl 137 MCG/SPRAY solution, PLACE 2 SPRAYS IN EACH NOSTRIL TWO TIMES A DAY (Patient not taking: Reported on 5/8/2023), Disp: 30 mL, Rfl: 1  •  clobetasol (TEMOVATE) 0.05 % ointment, , Disp: , Rfl:   •  doxepin (SINEquan) 25 MG capsule, , Disp: , Rfl:   •  doxycycline (VIBRAMYCIN) 100 MG capsule, Take 1 capsule by mouth 2 (Two) Times a Day. (Patient not taking: Reported on 5/8/2023), Disp: , Rfl:   •  Eliquis 5 MG tablet tablet, TAKE ONE TABLET BY MOUTH TWICE A DAY, Disp: 60 tablet, Rfl: 0  •  flunisolide (NASALIDE) 25 MCG/ACT (0.025%) solution nasal spray, , Disp: , Rfl:   •  Fluticasone Furoate-Vilanterol (BREO ELLIPTA) 200-25 MCG/ACT inhaler, Daily. (Patient not taking: Reported on 5/8/2023), Disp: , Rfl:   •  furosemide (LASIX) 20 MG tablet, TAKE ONE TABLET BY MOUTH DAILY, Disp: 30 tablet, Rfl: 3  •  hydrocortisone 2.5 % ointment, , Disp: , Rfl:   •  ipratropium-albuterol (DUO-NEB) 0.5-2.5 mg/3 ml nebulizer, Inhale the contents of 1 vial by nebulization 4 (Four) Times a Day. (Patient not taking: Reported on 5/8/2023), Disp: 360 mL, Rfl: 0  •  ketoconazole (NIZORAL) 2 % shampoo, , Disp: , Rfl:   •  lisinopril  "(PRINIVIL,ZESTRIL) 10 MG tablet, Take 1 tablet by mouth Daily. (Patient not taking: Reported on 5/8/2023), Disp: , Rfl:   •  methocarbamol (ROBAXIN) 500 MG tablet, TAKE ONE TABLET BY MOUTH FOUR TIMES A DAY AS NEEDED FOR BACK PAIN, Disp: 90 tablet, Rfl: 0  •  methotrexate 2.5 MG tablet, , Disp: , Rfl:   •  montelukast (Singulair) 10 MG tablet, Take 1 tablet by mouth Every Night., Disp: 90 tablet, Rfl: 1  •  rosuvastatin (CRESTOR) 10 MG tablet, Take  by mouth Daily. (Patient not taking: Reported on 5/8/2023), Disp: , Rfl:   •  tadalafil (CIALIS) 20 MG tablet, TAKE 1 TABLET BY MOUTH AS NEEDED APPROXIMATELY 1 HOUR BEFORE SEXUAL ACTIVITY (Patient not taking: Reported on 5/8/2023), Disp: , Rfl:   •  traZODone (DESYREL) 100 MG tablet, Take 1 tablet by mouth Daily., Disp: , Rfl:   Allergies:  Allergies   Allergen Reactions   • Ibuprofen Shortness Of Breath   • Aspirin      Social History:   Social History     Tobacco Use   • Smoking status: Never   • Smokeless tobacco: Never   Substance Use Topics   • Alcohol use: Yes     Comment: occassionally      Family History:  Family History   Problem Relation Age of Onset   • Coronary artery disease Father           Review of Systems  See history of present illness and past medical history.    Constitutional: Positive for fatigue and fever.   Respiratory: Positive for cough and shortness of breath.    Cardiovascular: Negative for chest pain.   Musculoskeletal: Positive for arthralgias.   All other systems reviewed and are negative.    Vitals:   /76   Pulse 106   Temp (!) 100.7 °F (38.2 °C) (Temporal)   Resp 26   Ht 186.7 cm (73.5\")   Wt 103 kg (228 lb)   SpO2 100%   BMI 29.67 kg/m²   I/O:     Intake/Output Summary (Last 24 hours) at 5/26/2023 1833  Last data filed at 5/26/2023 1727  Gross per 24 hour   Intake 1100 ml   Output --   Net 1100 ml     Exam:  Patient is examined using the personal protective equipment as per guidelines from infection control for this " particular patient as enacted.  Hand washing was performed before and after patient interaction.  General Appearance:   Ill-appearing nontoxic male who is comfortable interactive and does not appear to be in any acute distress unless he exerts himself.   Head:    Normocephalic, without obvious abnormality, atraumatic   Eyes:    PERRL, conjunctiva/corneas clear, EOM's intact, both eyes   Ears:    Normal external ear canals, both ears   Nose:   Nares normal, septum midline, mucosa normal, no drainage    or sinus tenderness   Throat:   Lips, tongue, gums normal; oral mucosa pink and moist   Neck:   Supple, symmetrical, trachea midline, no adenopathy;     thyroid:  no enlargement/tenderness/nodules; no carotid    bruit or JVD   Back:     Symmetric, no curvature, ROM normal, no CVA tenderness   Lungs:    Bilateral fine crackles with occasional rhonchi decreased breath sounds at the right lung base but no obvious use of accessory muscles of breathing noted.   Chest Wall:    No tenderness or deformity    Heart:   S1-S2 regular   Abdomen:     Soft, non-tender, bowel sounds active all four quadrants,     no masses, no hepatomegaly, no splenomegaly   Extremities:   Extremities normal, atraumatic, no cyanosis or edema   Pulses:   Pulses palpable in all extremities; symmetric all extremities   Skin:   Skin color normal, Skin is warm and dry,  no rashes or palpable lesions   Neurologic:  Alert and oriented and grossly nonfocal       Data Review:      I reviewed the patient's new clinical results.  Results from last 7 days   Lab Units 05/26/23  1429   WBC 10*3/mm3 22.52*   HEMOGLOBIN g/dL 13.7   PLATELETS 10*3/mm3 266     Results from last 7 days   Lab Units 05/26/23  1429   SODIUM mmol/L 131*   POTASSIUM mmol/L 4.1   CHLORIDE mmol/L 95*   CO2 mmol/L 24.0   BUN mg/dL 18   CREATININE mg/dL 1.83*   CALCIUM mg/dL 8.9   GLUCOSE mg/dL 95     Microbiology Results (last 10 days)     Procedure Component Value - Date/Time    Respiratory  Panel PCR w/COVID-19(SARS-CoV-2) SURAJ/ZEUS/ENDY/PAD/COR/MAD/RADHA In-House, NP Swab in UTM/VTM, 3-4 HR TAT - Swab, Nasopharynx [[id number]]  (Normal) Collected: 05/26/23 1631    Lab Status: Final result Specimen: Swab from Nasopharynx Updated: 05/26/23 2034     ADENOVIRUS, PCR Not Detected     Coronavirus 229E Not Detected     Coronavirus HKU1 Not Detected     Coronavirus NL63 Not Detected     Coronavirus OC43 Not Detected     COVID19 Not Detected     Human Metapneumovirus Not Detected     Human Rhinovirus/Enterovirus Not Detected     Influenza A PCR Not Detected     Influenza B PCR Not Detected     Parainfluenza Virus 1 Not Detected     Parainfluenza Virus 2 Not Detected     Parainfluenza Virus 3 Not Detected     Parainfluenza Virus 4 Not Detected     RSV, PCR Not Detected     Bordetella pertussis pcr Not Detected     Bordetella parapertussis PCR Not Detected     Chlamydophila pneumoniae PCR Not Detected     Mycoplasma pneumo by PCR Not Detected    Narrative:      In the setting of a positive respiratory panel with a viral infection PLUS a negative procalcitonin without other underlying concern for bacterial infection, consider observing off antibiotics or discontinuation of antibiotics and continue supportive care. If the respiratory panel is positive for atypical bacterial infection (Bordetella pertussis, Chlamydophila pneumoniae, or Mycoplasma pneumoniae), consider antibiotic de-escalation to target atypical bacterial infection.        No radiology results for the last 30 days.  ECG 12 Lead Dyspnea    (Results Pending)     Brief Urine Lab Results     None          Assessment:  Active Hospital Problems    Diagnosis  POA   • **Healthcare-associated pneumonia [J18.9]  Yes   • A-fib [I48.91]  Unknown   • MURIEL (acute kidney injury) [N17.9]  Unknown   • Acute respiratory failure with hypoxia [J96.01]  Yes   • Chronic interstitial lung disease [J84.9]  Yes   • Primary hypertension [I10]  Yes   • Asthma [J45.909]  Yes        Medical decision making/care plan: See admitting orders  · Pneumonia with progressive hypoxia and exacerbation of underlying asthma and interstitial lung disease-plan is to admit the patient continue with IV fluids oxygen supplementation and IV antibiotics consisting of ceftriaxone and Zithromax with close pulmonary follow-up.  Follow-up on blood cultures.  · Asthma with exacerbation-continue with steroids and nebulizer treatment provided oxygen supplementation and monitor his progress with continuous pulse ox monitoring.  · Acute kidney injury on chronic kidney disease-continue with fluids and avoid nephrotoxic agent and hypotensive episodes.  · Hypertension-continue antihypertensive and avoid hypotensive episodes.  · Chronic interstitial lung disease on immunosuppressive regimen-defer further management to our pulmonary colleagues who are on the case and seen the patient in the emergency room.  Continue with higher dose of prednisone as it was initiated along with methotrexate and Imuran as he is taking at home.  · Atrial fibrillation-currently rate is controlled-continue his current regimen including anticoagulation therapy.      Daniel Kaur MD   5/26/2023  18:33 EDT    Parts of this note may be an electronic transcription/translation of spoken language to printed text using the Dragon dictation system.

## 2023-05-26 NOTE — LETTER
[facility]  [address] [facility] [id number]  [phone]        May 27, 2023      Patient: Orion Casiano  YOB: 1950  Date of Visit: 5/26/2023      Requested information on above patient, [employer] ID: [id number]        Cynthia Winslow RN

## 2023-05-26 NOTE — CONSULTS
Patient Care Team:  Niecy Galvan MD as PCP - General (Family Medicine)  Patti Navarro MD as Consulting Physician (Gastroenterology)    Chief complaint:  Shortness of breath and cough.    History of present illness:  Subjective     This is a 72-year-old male patient with history of pulmonary fibrosis, suspected NSIP, bronchiectasis and psoriasis.  He follows at [facility] clinic with Dr. Justin Sullivan.  He is currently on Imuran 150 mg daily and prednisone 10 mg daily.     He also reported a history of asthma diagnosed in childhood.    Review of the records on care everywhere indicated that he was hospitalized at [facility] in 2015 for shortness of breath and had a CT of the chest which showed bilateral pulmonary infiltrates more prominent on the right and mediastinal/hilar adenopathies     He was hospitalized in February of this year for shortness of breath, suspected to be secondary to asthma exacerbation.  This was complicated by A-fib.  However due to worsening infiltrates noted on his CXR and also prior subcarinal adenopathy, he underwent bronchoscopy with biopsy of the subcarinal lymph node and transbronchial biopsy of the right lower lobe (site of infiltrates) on 2/27/2023.  BAL showed mild eosinophilia (90%).  BAL cytology showed rare atypical cells but immunostain was negative.  There was histiocytes noted on the TBNA and BAL.  Patient ultimately improved and was discharged home        .  Apparently he underwent bronchoscopy in May 2015 which was not diagnostic.  Serology were unrevealing.  I do not have the complete records (i.e. laboratory work-up/path was not available).  Recommendations were to perform a lung biopsy but when asked the patient today, he stated that he never had biopsy.     He presented to the hospital today for shortness of breath and cough.  Symptoms started last Tuesday.  Initially had sinus congestion, body aches, fever and fatigue and this was followed by cough and  worsening shortness of breath.  Symptoms were not relieved by the use of albuterol inhaler twice a day and Trelegy.    He is normally on oxygen 2 L/min he said but currently requiring 4 to 5 L.    In the ED he had a temperature of 100.2.  WBC was 20 K with left shift.  No significant eosinophils.    Review of Systems:  Constitutional: No fever or chills.   ENMT: No sinus congestion  Cardiovascular: No chest pain, palpitation or legs swelling.    Respiratory: See above  Gastrointestinal: Diarrhea occurred today.  No nausea or vomiting.  No dysphagia or choking while eating.  Neurology: No headache, weakness, numbness or dizziness.   Musculoskeletal: No joints pain, stiffness or swelling.   Psychiatry: No depression.  Genitourinary: No dysuria or frequent urination  Endo: No weight changes. No cold or warm intolerance.  Lymphatic: No swollen glands.  Integumentary: No rash.    History  Past Medical History:   Diagnosis Date   • Acute bronchitis    • Adenomatous polyp of colon 01/08/2020    Added automatically from request for surgery [id number]   • Allergic rhinitis    • Asthma    • Bacterial pneumonia    • Bilateral lower leg cellulitis    • Colon polyp    • Cutaneous abscess of left lower limb    • Deficiency of other specified B group vitamins    • Dermatitis    • Disc disorder of lumbar region     worse at L4-5 with protusion/herniation   • Dysuria    • Effusion, right hand     resolved   • Essential (primary) hypertension    • Frequency of micturition    • Gout    • Hematuria    • Hyperglycemia    • Hyperlipidemia    • Hypertension    • ILD (interstitial lung disease)    • Insomnia    • Localized swelling, mass and lump, unspecified    • Low back pain    • Lumbago    • Microscopic hematuria    • Pain in left knee    • Sleep disturbances    • Vertigo     resolved   • Vitamin B12 deficiency      Past Surgical History:   Procedure Laterality Date   • BRONCHOSCOPY  02/27/2023    Procedure: BRONCHOSCOPY WITH FLUORO, BAL,  AND BIOPSIES AND ENDOBRONCHIAL ULTRASOUND WITH FNA;  Surgeon: Rubens Sheets MD;  Location: [facility] ENDOSCOPY;  Service: Pulmonary;;  INTERSTITIAL LUNG DISEASE   • CARDIAC CATHETERIZATION N/A 02/24/2023    Procedure: Right Heart Cath;  Surgeon: Ravi Fonseca MD;  Location: [facility] CATH INVASIVE LOCATION;  Service: Cardiovascular;  Laterality: N/A;   • COLONOSCOPY      maybe 2016   • COLONOSCOPY N/A 03/11/2020    Procedure: COLONOSCOPY TO CECUM AND TI WITH COLD AND HOT SNARE AND POLYPECTOMIES;  Surgeon: Patti Navarro MD;  Location: [facility] ENDOSCOPY;  Service: Gastroenterology;  Laterality: N/A;  PRE: H/O COLON POLYPS  POST: POLYPS, HEMORRHOIDS, DIVERTICULOSIS   • LUMBAR DISC SURGERY      2008   • NOSE SURGERY       Family History   Problem Relation Age of Onset   • Coronary artery disease Father      Social History     Tobacco Use   • Smoking status: Never   • Smokeless tobacco: Never   Vaping Use   • Vaping Use: Never used   Substance Use Topics   • Alcohol use: Yes     Comment: occassionally   • Drug use: Never     E-cigarette/Vaping   • E-cigarette/Vaping Use Never User      E-cigarette/Vaping Substances     Allergies:  Ibuprofen and Aspirin    Objective   Vital Signs  Temp:  [100.7 °F (38.2 °C)] 100.7 °F (38.2 °C)  Heart Rate:  [105-110] 110  Resp:  [26] 26  BP: (126)/(72) 126/72    PPE used per hospital policy    Physical Exam:  Constitutional: Not in acute distress.  Eyes: Injected conjunctivae, EOMI. Pupils equal and reactive to light.   ENMT: [place] 3. No oral thrush.  Moist tongue  Neck: No thyromegaly.  Trachea midline  Heart: RRR, no murmur.  No pitting edema  Lungs: Equal with diminished air entry overall.  Crackles mostly on the right and minimal on the left base..  Bilateral expiratory wheezing.  Abdomen: Obese. Soft. No tenderness or dullness.  Positive bowel sounds  Extremities: No cyanosis, clubbing. Moves all extremities.  Warm extremities and well-perfused  Neuro: Conscious, alert,  oriented x3.  Strength 5/5 overall  Psych: Appropriate mood and affect.    Integumentary: No rash or ecchymosis  Lymphatic: No palpable cervical or supraclavicular lymph nodes.            Diagnostic imaging:  I personally and independently reviewed the following images:   CXR 5/26/2023: Worsening pulmonary infiltrates on the right.      Laboratory workup:          Results from last 7 days   Lab Units 05/26/23  1429   WBC 10*3/mm3 22.52*   HEMOGLOBIN g/dL 13.7   HEMATOCRIT % 40.2   PLATELETS 10*3/mm3 266               Assessment     1. Right pneumonia  2. ILD, unspecified seems that he has mild fibrotic changes as well consistent with honeycombing but no typical UIP.  Increase in the GG infiltrates in the right lower lobe on latest CT chest 2/22/2023 compared to 7/8/2022.  S/p bronch with BAL and TBB 2/27, unrevealing with the exception of histiocytes and eosinophils on the BAL.  3. 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  Stability confirmed at least when compared to exam done 7/8/2022..  Suspect it is reactive secondary to ILD.  S/p TB NA 2/27 -> histiocytes  4. Pulmonary hypertension, moderate. mPAP 34, PCWP 19- Transpulmonary P: 15.  Consistent with group 2 and group 3.  5. Asthma exacerbation  6. 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 Immunosuppression: On Imuran 150 mg and prednisone 10 mg daily    8. Atrial fibrillation  9. Shortness of breath, secondary to the above      Plan:    · Oxygen by NC and titrate to keep SPO2 >90%  · Check RVP  · Antibiotics: Can treat with Rocephin and azithromycin for community-acquired pathogens.  Although patient is immunosuppressed.  He has received cefepime and vancomycin in the ED.  His last hospitalization was >3-month  · Initiate DuoNeb 4 times a day and Pulmicort twice daily  · Increase prednisone to 40 mg daily due to wheezing and suspected asthma exacerbation.  He is on 10 mg daily  at home  · Continue anticoagulation for A-fib.    Rubens Sheets MD  05/26/23  15:19 EDT          This note was dictated utilizing Dragon dictation

## 2023-05-27 LAB
ALBUMIN SERPL-MCNC: 2.7 G/DL (ref 3.5–5.2)
ALBUMIN/GLOB SERPL: 0.6 G/DL
ALP SERPL-CCNC: 146 U/L (ref 39–117)
ALT SERPL W P-5'-P-CCNC: 55 U/L (ref 1–41)
ANION GAP SERPL CALCULATED.3IONS-SCNC: 12.2 MMOL/L (ref 5–15)
AST SERPL-CCNC: 81 U/L (ref 1–40)
BILIRUB SERPL-MCNC: 4.2 MG/DL (ref 0–1.2)
BUN SERPL-MCNC: 25 MG/DL (ref 8–23)
BUN/CREAT SERPL: 18 (ref 7–25)
CALCIUM SPEC-SCNC: 9.6 MG/DL (ref 8.6–10.5)
CHLORIDE SERPL-SCNC: 99 MMOL/L (ref 98–107)
CO2 SERPL-SCNC: 21.8 MMOL/L (ref 22–29)
CREAT SERPL-MCNC: 1.39 MG/DL (ref 0.76–1.27)
DEPRECATED RDW RBC AUTO: 46.4 FL (ref 37–54)
EGFRCR SERPLBLD CKD-EPI 2021: 53.9 ML/MIN/1.73
ERYTHROCYTE [DISTWIDTH] IN BLOOD BY AUTOMATED COUNT: 13.9 % (ref 12.3–15.4)
GLOBULIN UR ELPH-MCNC: 4.5 GM/DL
GLUCOSE SERPL-MCNC: 113 MG/DL (ref 65–99)
HCT VFR BLD AUTO: 39.2 % (ref 37.5–51)
HGB BLD-MCNC: 13.4 G/DL (ref 13–17.7)
L PNEUMO1 AG UR QL IA: POSITIVE
LYMPHOCYTES # BLD MANUAL: 1.15 10*3/MM3 (ref 0.7–3.1)
LYMPHOCYTES NFR BLD MANUAL: 2 % (ref 5–12)
MCH RBC QN AUTO: 32 PG (ref 26.6–33)
MCHC RBC AUTO-ENTMCNC: 34.2 G/DL (ref 31.5–35.7)
MCV RBC AUTO: 93.6 FL (ref 79–97)
MONOCYTES # BLD: 0.38 10*3/MM3 (ref 0.1–0.9)
NEUTROPHILS # BLD AUTO: 17.65 10*3/MM3 (ref 1.7–7)
NEUTROPHILS NFR BLD MANUAL: 92 % (ref 42.7–76)
PLAT MORPH BLD: NORMAL
PLATELET # BLD AUTO: 270 10*3/MM3 (ref 140–450)
PMV BLD AUTO: 10.6 FL (ref 6–12)
POIKILOCYTOSIS BLD QL SMEAR: ABNORMAL
POTASSIUM SERPL-SCNC: 4.6 MMOL/L (ref 3.5–5.2)
PROT SERPL-MCNC: 7.2 G/DL (ref 6–8.5)
RBC # BLD AUTO: 4.19 10*6/MM3 (ref 4.14–5.8)
S PNEUM AG SPEC QL LA: NEGATIVE
SODIUM SERPL-SCNC: 133 MMOL/L (ref 136–145)
VARIANT LYMPHS NFR BLD MANUAL: 6 % (ref 19.6–45.3)
WBC MORPH BLD: NORMAL
WBC NRBC COR # BLD: 19.18 10*3/MM3 (ref 3.4–10.8)

## 2023-05-27 PROCEDURE — 25010000002 CEFTRIAXONE PER 250 MG: Performed by: INTERNAL MEDICINE

## 2023-05-27 PROCEDURE — 25010000002 AZITHROMYCIN PER 500 MG: Performed by: INTERNAL MEDICINE

## 2023-05-27 PROCEDURE — 85025 COMPLETE CBC W/AUTO DIFF WBC: CPT | Performed by: INTERNAL MEDICINE

## 2023-05-27 PROCEDURE — 63710000001 PREDNISONE PER 1 MG: Performed by: INTERNAL MEDICINE

## 2023-05-27 PROCEDURE — 87449 NOS EACH ORGANISM AG IA: CPT | Performed by: INTERNAL MEDICINE

## 2023-05-27 PROCEDURE — 94799 UNLISTED PULMONARY SVC/PX: CPT

## 2023-05-27 PROCEDURE — 87899 AGENT NOS ASSAY W/OPTIC: CPT | Performed by: INTERNAL MEDICINE

## 2023-05-27 PROCEDURE — 85007 BL SMEAR W/DIFF WBC COUNT: CPT | Performed by: INTERNAL MEDICINE

## 2023-05-27 PROCEDURE — 80053 COMPREHEN METABOLIC PANEL: CPT | Performed by: INTERNAL MEDICINE

## 2023-05-27 RX ORDER — ALLOPURINOL 100 MG/1
100 TABLET ORAL DAILY
Status: DISCONTINUED | OUTPATIENT
Start: 2023-05-27 | End: 2023-06-06 | Stop reason: HOSPADM

## 2023-05-27 RX ORDER — ALBUTEROL SULFATE 2.5 MG/3ML
2.5 SOLUTION RESPIRATORY (INHALATION) EVERY 6 HOURS PRN
Status: DISCONTINUED | OUTPATIENT
Start: 2023-05-27 | End: 2023-06-06 | Stop reason: HOSPADM

## 2023-05-27 RX ORDER — MONTELUKAST SODIUM 10 MG/1
10 TABLET ORAL NIGHTLY
Status: DISCONTINUED | OUTPATIENT
Start: 2023-05-27 | End: 2023-06-06 | Stop reason: HOSPADM

## 2023-05-27 RX ORDER — AZELASTINE HYDROCHLORIDE 137 UG/1
2 SPRAY, METERED NASAL 2 TIMES DAILY
Status: DISCONTINUED | OUTPATIENT
Start: 2023-05-27 | End: 2023-06-06 | Stop reason: HOSPADM

## 2023-05-27 RX ORDER — TRAZODONE HYDROCHLORIDE 100 MG/1
100 TABLET ORAL DAILY
Status: DISCONTINUED | OUTPATIENT
Start: 2023-05-27 | End: 2023-06-06 | Stop reason: HOSPADM

## 2023-05-27 RX ORDER — ROSUVASTATIN CALCIUM 10 MG/1
10 TABLET, COATED ORAL DAILY
Status: DISCONTINUED | OUTPATIENT
Start: 2023-05-27 | End: 2023-06-06 | Stop reason: HOSPADM

## 2023-05-27 RX ADMIN — IPRATROPIUM BROMIDE AND ALBUTEROL SULFATE 3 ML: 2.5; .5 SOLUTION RESPIRATORY (INHALATION) at 10:36

## 2023-05-27 RX ADMIN — ROSUVASTATIN CALCIUM 10 MG: 10 TABLET, FILM COATED ORAL at 09:14

## 2023-05-27 RX ADMIN — IPRATROPIUM BROMIDE AND ALBUTEROL SULFATE 3 ML: 2.5; .5 SOLUTION RESPIRATORY (INHALATION) at 06:51

## 2023-05-27 RX ADMIN — SODIUM CHLORIDE 1 G: 9 INJECTION, SOLUTION INTRAVENOUS at 05:28

## 2023-05-27 RX ADMIN — AZELASTINE HYDROCHLORIDE 274 MCG: 137 SPRAY, METERED NASAL at 09:14

## 2023-05-27 RX ADMIN — PREDNISONE 40 MG: 20 TABLET ORAL at 09:14

## 2023-05-27 RX ADMIN — ALLOPURINOL 100 MG: 100 TABLET ORAL at 09:14

## 2023-05-27 RX ADMIN — MONTELUKAST SODIUM 10 MG: 10 TABLET, FILM COATED ORAL at 21:33

## 2023-05-27 RX ADMIN — TRAZODONE HYDROCHLORIDE 100 MG: 100 TABLET ORAL at 09:14

## 2023-05-27 RX ADMIN — IPRATROPIUM BROMIDE AND ALBUTEROL SULFATE 3 ML: 2.5; .5 SOLUTION RESPIRATORY (INHALATION) at 21:10

## 2023-05-27 RX ADMIN — IPRATROPIUM BROMIDE AND ALBUTEROL SULFATE 3 ML: 2.5; .5 SOLUTION RESPIRATORY (INHALATION) at 15:08

## 2023-05-27 RX ADMIN — APIXABAN 5 MG: 5 TABLET, FILM COATED ORAL at 09:14

## 2023-05-27 RX ADMIN — AZITHROMYCIN MONOHYDRATE 500 MG: 500 INJECTION, POWDER, LYOPHILIZED, FOR SOLUTION INTRAVENOUS at 09:15

## 2023-05-27 RX ADMIN — Medication 10 ML: at 21:33

## 2023-05-27 RX ADMIN — AZELASTINE HYDROCHLORIDE 274 MCG: 137 SPRAY, METERED NASAL at 21:34

## 2023-05-27 RX ADMIN — APIXABAN 5 MG: 5 TABLET, FILM COATED ORAL at 21:32

## 2023-05-27 NOTE — PLAN OF CARE
Goal Outcome Evaluation:  Plan of Care Reviewed With: patient        Progress: no change   Pt admitted tonight r/t Pneumonia. Pt is alert and oriented x4. Pt is currently on 3 Lof O2 per n/c. Pt denies any pain or discomfort. Pt on IV atbs no adverse reaction noted. Will continue to monitor.

## 2023-05-27 NOTE — PLAN OF CARE
Goal Outcome Evaluation:      Patient increased to 5L o2 today. Patient appears to be intermittently confused. Up ad ofelia to the bathroom, pulling off all monitors as he goes as well as oxygen. O2 sats down to low 80s once placed back on monitor. Intermittently agitated. Refused bed change multiple times this afternoon.

## 2023-05-27 NOTE — PROGRESS NOTES
"                                              LOS: 1 day   Patient Care Team:  Niecy Galvan MD as PCP - General (Family Medicine)  Patti Navarro MD as Consulting Physician (Gastroenterology)    Chief Complaint:  F/up pneumonia, respiratory failure and ILD.    Subjective   Interval History  Continues to have shortness of breath, worse with minimal activities.  On 5 L oxygen.    Mild cough, no sputum.    REVIEW OF SYSTEMS:     GASTROINTESTINAL: No more diarrhea.  No nausea or vomiting.  CONSTITUTIONAL: No fever or chills.     Ventilator/Non-Invasive Ventilation Settings (From admission, onward)    None                Physical Exam:     Vital Signs  Temp:  [97.8 °F (36.6 °C)-98.9 °F (37.2 °C)] 98 °F (36.7 °C)  Heart Rate:  [] 111  Resp:  [18-22] 20  BP: (144-155)/(84-93) 144/92    Intake/Output Summary (Last 24 hours) at 5/27/2023 1630  Last data filed at 5/27/2023 0810  Gross per 24 hour   Intake 1880 ml   Output --   Net 1880 ml     Flowsheet Rows    Flowsheet Row First Filed Value   Admission Height 186.7 cm (73.5\") Documented at 05/26/2023 1339   Admission Weight 103 kg (228 lb) Documented at 05/26/2023 1339          PPE used per hospital policy    General Appearance:   Alert, cooperative, in no acute distress   ENMT:  Mallampati score 3, moist mucous membrane   Eyes:  Pupils equal and reactive to light. EOMI   Neck:   Large. Trachea midline. No thyromegaly.   Lungs:    Crackles on the right, diffuse.  Mild expiratory wheezing.  No labored breathing.    Heart:   Regular rhythm and normal rate, normal S1 and S2, no         murmur   Skin:   No rash or ecchymosis   Abdomen:    Obese. Soft. No tenderness. No HSM.   Neuro/psych:  Conscious, alert, oriented x3. Strength 5/5 in upper and lower  ext.  Appropriate mood and affect   Extremities:  No cyanosis, clubbing or edema.  Warm extremities and well-perfused          Results Review:        Results from last 7 days   Lab Units 05/27/23  0627 " 05/26/23  1429   SODIUM mmol/L 133* 131*   POTASSIUM mmol/L 4.6 4.1   CHLORIDE mmol/L 99 95*   CO2 mmol/L 21.8* 24.0   BUN mg/dL 25* 18   CREATININE mg/dL 1.39* 1.83*   GLUCOSE mg/dL 113* 95   CALCIUM mg/dL 9.6 8.9         Results from last 7 days   Lab Units 05/27/23  0627 05/26/23  1429   WBC 10*3/mm3 19.18* 22.52*   HEMOGLOBIN g/dL 13.4 13.7   HEMATOCRIT % 39.2 40.2   PLATELETS 10*3/mm3 270 266                           Results from last 7 days   Lab Units 05/26/23  1526   PH, ARTERIAL pH units 7.468*   PCO2, ARTERIAL mm Hg 33.7*   PO2 ART mm Hg 50.9*   FLOW RATE lpm 1   MODALITY  Cannula   O2 SATURATION CALC % 88.2*         I reviewed the patient's new clinical results.        Medication Review:   allopurinol, 100 mg, Oral, Daily  apixaban, 5 mg, Oral, BID  Azelastine HCl, 2 spray, Each Nare, BID  azithromycin, 500 mg, Intravenous, Q24H  cefTRIAXone, 1 g, Intravenous, Q24H  ipratropium-albuterol, 3 mL, Nebulization, 4x Daily - RT  [START ON 5/30/2023] methotrexate, 2.5 mg, Oral, Weekly  montelukast, 10 mg, Oral, Nightly  predniSONE, 40 mg, Oral, Daily With Breakfast  rosuvastatin, 10 mg, Oral, Daily  senna-docusate sodium, 2 tablet, Oral, BID  sodium chloride, 10 mL, Intravenous, Q12H  traZODone, 100 mg, Oral, Daily            Assessment     1. Right pneumonia  2. ILD, unspecified seems that he has mild fibrotic changes as well consistent with honeycombing but no typical UIP.  Increase in the GG infiltrates in the right lower lobe on latest CT chest 2/22/2023 compared to 7/8/2022.  S/p bronch with BAL and TBB 2/27, unrevealing with the exception of histiocytes and eosinophils on the BAL.  3. 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  Stability confirmed at least when compared to exam done 7/8/2022..  Suspect it is reactive secondary to ILD.  S/p TB NA 2/27 -> histiocytes  4. Pulmonary hypertension, moderate. mPAP 34, PCWP 19- Transpulmonary P: 15.  Consistent  with group 2 and group 3.  5. Asthma exacerbation  6. 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 Immunosuppression: On Imuran 150 mg and prednisone 10 mg daily     8. Atrial fibrillation  9. Shortness of breath, secondary to the above      Plan     · Antibiotics with Rocephin and azithromycin  · Oxygen by NC and titrate keep SPO2 >90%  · Prednisone 40 mg daily.  Imuran on hold now due to pneumonia.  Can be resumed later on when patient recovers from the pneumonia.  · Check urine strep and Legionella antigen  · VTE prophylaxis: Eliquis.        Rubens Sheets MD  05/27/23  16:30 EDT          This note was dictated utilizing Dragon dictation

## 2023-05-27 NOTE — DISCHARGE PLACEMENT REQUEST
"Orion Vidal (72 y.o. Male)     Date of Birth   1950    Social Security Number       Address   [address] [place] [address]  [facility] [place] [address]    Home Phone   [phone]    MRN   [id number]       Gnosticism   Scientologist of [employer]    Marital Status                               Admission Date   5/26/23    Admission Type   Emergency    Admitting Provider   Daniel Kaur MD    Attending Provider   Roderick Basilio MD    Department, Room/Bed   [address], [address]/1       Discharge Date       Discharge Disposition       Discharge Destination                               Attending Provider: Roderick Basilio MD    Allergies: Ibuprofen, Aspirin    Isolation: None   Infection: COVID (rule out) (05/26/23)   Code Status: CPR    Ht: 186.7 cm (73.5\")   Wt: 105 kg (230 lb 14.4 oz)    Admission Cmt: None   Principal Problem: Healthcare-associated pneumonia [J18.9]                 Active Insurance as of 5/26/2023     Primary Coverage     Payor Plan Insurance Group Employer/Plan Group    HUMANA MEDICARE REPLACEMENT HUMANA MEDICARE REPLACEMENT [id number]     Payor Plan Address Payor Plan Phone Number Payor Plan Fax Number Effective Dates    [address] [address] [phone]  1/1/2018 - None Entered    [facility] [id number]       Subscriber Name Subscriber Birth Date Member ID       ORION VIDAL 1950 [id number]                 Emergency Contacts      (Rel.) Home Phone Work Phone Mobile Phone    RORY VIDAL (Son) -- -- [phone]            Emergency Contact Information     Name Relation Home Work Mobile    RORY VIDAL Son   [phone]          Treatment Team     Provider Relationship Specialty Contact    Roderick Basilio MD Attending, Consulting Physician Hospitalist   [phone]      Eunice Del Castillo MA Medical Assistant -- --    Yael Villasenor, RN Registered Nurse Emergency Medicine --    Rubens Sheets MD Consulting Physician Pulmonary Disease   [phone]      " Imani Wei, LISS Respiratory Therapist Respiratory Therapy --    Lashae Yang, RN Registered Nurse -- --    Lizzy Jurado Patient Care Technician --   [phone]               History & Physical      Daniel Kaur MD at 23 1833          Internal medicine history and physical  INTERNAL MEDICINE   [facility]       Patient Identification:  Name: Orion Casiano  Age: 72 y.o.  Sex: male  :  1950  MRN: [id number]                   Primary Care Physician: Niecy Galvan MD                               Date of admission:2023    Chief Complaint: Worsening cough and shortness of breath since Tuesday    History of Present Illness:   Patient is a 72-year-old male with complicated past medical history including history of interstitial lung disease, chronic back pain, asthma, hypertension who is on ongoing treatment with prednisone and methotrexate and Imuran for his lung disease and follows with Dr. Martinez, has also history of atrial fibrillation on anticoagulation therapy since February of this year was in his usual state of his health with progressive shortness of breath fatigue and weakness until Tuesday when his symptoms got worse with discomfort in the chest getting worse with coughing and taking deep breaths.  He was running low-grade temperature and was producing orangeish greenish phlegm with coughing.  Because of his fatigue weakness and fever patient decided to come to the emergency room for further evaluation where work-up revealed worsening right-sided infiltrate in the setting of interstitial lung disease.  Patient was noted to have hypoxia on his ABG.  Work-up in the emergency room also included respiratory viral panel which came back negative.  Sputum culture and blood cultures have been sent patient was empirically started on a dose of cefepime and vancomycin for suspected healthcare associated pneumonia and pulmonary service was consulted.  He has seen  the patient in the emergency room and recommended higher dose of steroid while he is recovering from his underlying asthma exacerbation associated with pneumonia.  They recommended de-escalation of antibiotics to ceftriaxone and Zithromax.  After nebulizer treatment and steroids and antibiotics patient is feeling somewhat better.      Past Medical History:  Past Medical History:   Diagnosis Date   • Acute bronchitis    • Adenomatous polyp of colon 01/08/2020    Added automatically from request for surgery [id number]   • Allergic rhinitis    • Asthma    • Bacterial pneumonia    • Bilateral lower leg cellulitis    • Colon polyp    • Cutaneous abscess of left lower limb    • Deficiency of other specified B group vitamins    • Dermatitis    • Disc disorder of lumbar region     worse at L4-5 with protusion/herniation   • Dysuria    • Effusion, right hand     resolved   • Essential (primary) hypertension    • Frequency of micturition    • Gout    • Hematuria    • Hyperglycemia    • Hyperlipidemia    • Hypertension    • ILD (interstitial lung disease)    • Insomnia    • Localized swelling, mass and lump, unspecified    • Low back pain    • Lumbago    • Microscopic hematuria    • Pain in left knee    • Sleep disturbances    • Vertigo     resolved   • Vitamin B12 deficiency      Past Surgical History:  Past Surgical History:   Procedure Laterality Date   • BRONCHOSCOPY  02/27/2023    Procedure: BRONCHOSCOPY WITH FLUORO, BAL, AND BIOPSIES AND ENDOBRONCHIAL ULTRASOUND WITH FNA;  Surgeon: Rubens Sheets MD;  Location: [facility] ENDOSCOPY;  Service: Pulmonary;;  INTERSTITIAL LUNG DISEASE   • CARDIAC CATHETERIZATION N/A 02/24/2023    Procedure: Right Heart Cath;  Surgeon: Ravi Fonseca MD;  Location: [facility] CATH INVASIVE LOCATION;  Service: Cardiovascular;  Laterality: N/A;   • COLONOSCOPY      maybe 2016   • COLONOSCOPY N/A 03/11/2020    Procedure: COLONOSCOPY TO CECUM AND TI WITH COLD AND HOT SNARE AND POLYPECTOMIES;   Surgeon: Patti Navarro MD;  Location: [facility] ENDOSCOPY;  Service: Gastroenterology;  Laterality: N/A;  PRE: H/O COLON POLYPS  POST: POLYPS, HEMORRHOIDS, DIVERTICULOSIS   • LUMBAR DISC SURGERY      2008   • NOSE SURGERY        Home Meds:  (Not in a hospital admission)    Current Meds:     Current Facility-Administered Medications:   •  ipratropium-albuterol (DUO-NEB) nebulizer solution 3 mL, 3 mL, Nebulization, 4x Daily - RT, Rubens Sheets MD, 3 mL at 05/26/23 1622  •  [START ON 5/27/2023] predniSONE (DELTASONE) tablet 40 mg, 40 mg, Oral, Daily With Breakfast, Rubens Sheets MD    Current Outpatient Medications:   •  albuterol sulfate  (90 Base) MCG/ACT inhaler, Inhale 2 puffs Every 6 (Six) Hours As Needed for Wheezing., Disp: 8 g, Rfl: 0  •  allopurinol (ZYLOPRIM) 100 MG tablet, TAKE ONE TABLET BY MOUTH DAILY, Disp: 30 tablet, Rfl: 2  •  amLODIPine (NORVASC) 10 MG tablet, Take  by mouth Daily. (Patient not taking: Reported on 5/8/2023), Disp: , Rfl:   •  azaTHIOprine (IMURAN) 50 MG tablet, Take  by mouth Daily., Disp: , Rfl:   •  Azelastine HCl 137 MCG/SPRAY solution, PLACE 2 SPRAYS IN EACH NOSTRIL TWO TIMES A DAY (Patient not taking: Reported on 5/8/2023), Disp: 30 mL, Rfl: 1  •  clobetasol (TEMOVATE) 0.05 % ointment, , Disp: , Rfl:   •  doxepin (SINEquan) 25 MG capsule, , Disp: , Rfl:   •  doxycycline (VIBRAMYCIN) 100 MG capsule, Take 1 capsule by mouth 2 (Two) Times a Day. (Patient not taking: Reported on 5/8/2023), Disp: , Rfl:   •  Eliquis 5 MG tablet tablet, TAKE ONE TABLET BY MOUTH TWICE A DAY, Disp: 60 tablet, Rfl: 0  •  flunisolide (NASALIDE) 25 MCG/ACT (0.025%) solution nasal spray, , Disp: , Rfl:   •  Fluticasone Furoate-Vilanterol (BREO ELLIPTA) 200-25 MCG/ACT inhaler, Daily. (Patient not taking: Reported on 5/8/2023), Disp: , Rfl:   •  furosemide (LASIX) 20 MG tablet, TAKE ONE TABLET BY MOUTH DAILY, Disp: 30 tablet, Rfl: 3  •  hydrocortisone 2.5 % ointment, , Disp: , Rfl:   •   "ipratropium-albuterol (DUO-NEB) 0.5-2.5 mg/3 ml nebulizer, Inhale the contents of 1 vial by nebulization 4 (Four) Times a Day. (Patient not taking: Reported on 5/8/2023), Disp: 360 mL, Rfl: 0  •  ketoconazole (NIZORAL) 2 % shampoo, , Disp: , Rfl:   •  lisinopril (PRINIVIL,ZESTRIL) 10 MG tablet, Take 1 tablet by mouth Daily. (Patient not taking: Reported on 5/8/2023), Disp: , Rfl:   •  methocarbamol (ROBAXIN) 500 MG tablet, TAKE ONE TABLET BY MOUTH FOUR TIMES A DAY AS NEEDED FOR BACK PAIN, Disp: 90 tablet, Rfl: 0  •  methotrexate 2.5 MG tablet, , Disp: , Rfl:   •  montelukast (Singulair) 10 MG tablet, Take 1 tablet by mouth Every Night., Disp: 90 tablet, Rfl: 1  •  rosuvastatin (CRESTOR) 10 MG tablet, Take  by mouth Daily. (Patient not taking: Reported on 5/8/2023), Disp: , Rfl:   •  tadalafil (CIALIS) 20 MG tablet, TAKE 1 TABLET BY MOUTH AS NEEDED APPROXIMATELY 1 HOUR BEFORE SEXUAL ACTIVITY (Patient not taking: Reported on 5/8/2023), Disp: , Rfl:   •  traZODone (DESYREL) 100 MG tablet, Take 1 tablet by mouth Daily., Disp: , Rfl:   Allergies:  Allergies   Allergen Reactions   • Ibuprofen Shortness Of Breath   • Aspirin      Social History:   Social History     Tobacco Use   • Smoking status: Never   • Smokeless tobacco: Never   Substance Use Topics   • Alcohol use: Yes     Comment: occassionally      Family History:  Family History   Problem Relation Age of Onset   • Coronary artery disease Father           Review of Systems  See history of present illness and past medical history.    Constitutional: Positive for fatigue and fever.   Respiratory: Positive for cough and shortness of breath.    Cardiovascular: Negative for chest pain.   Musculoskeletal: Positive for arthralgias.   All other systems reviewed and are negative.    Vitals:   /76   Pulse 106   Temp (!) 100.7 °F (38.2 °C) (Temporal)   Resp 26   Ht 186.7 cm (73.5\")   Wt 103 kg (228 lb)   SpO2 100%   BMI 29.67 kg/m²   I/O:     Intake/Output " Summary (Last 24 hours) at 5/26/2023 1833  Last data filed at 5/26/2023 1727  Gross per 24 hour   Intake 1100 ml   Output --   Net 1100 ml     Exam:  Patient is examined using the personal protective equipment as per guidelines from infection control for this particular patient as enacted.  Hand washing was performed before and after patient interaction.  General Appearance:   Ill-appearing nontoxic male who is comfortable interactive and does not appear to be in any acute distress unless he exerts himself.   Head:    Normocephalic, without obvious abnormality, atraumatic   Eyes:    PERRL, conjunctiva/corneas clear, EOM's intact, both eyes   Ears:    Normal external ear canals, both ears   Nose:   Nares normal, septum midline, mucosa normal, no drainage    or sinus tenderness   Throat:   Lips, tongue, gums normal; oral mucosa pink and moist   Neck:   Supple, symmetrical, trachea midline, no adenopathy;     thyroid:  no enlargement/tenderness/nodules; no carotid    bruit or JVD   Back:     Symmetric, no curvature, ROM normal, no CVA tenderness   Lungs:    Bilateral fine crackles with occasional rhonchi decreased breath sounds at the right lung base but no obvious use of accessory muscles of breathing noted.   Chest Wall:    No tenderness or deformity    Heart:   S1-S2 regular   Abdomen:     Soft, non-tender, bowel sounds active all four quadrants,     no masses, no hepatomegaly, no splenomegaly   Extremities:   Extremities normal, atraumatic, no cyanosis or edema   Pulses:   Pulses palpable in all extremities; symmetric all extremities   Skin:   Skin color normal, Skin is warm and dry,  no rashes or palpable lesions   Neurologic:  Alert and oriented and grossly nonfocal       Data Review:      I reviewed the patient's new clinical results.  Results from last 7 days   Lab Units 05/26/23  1429   WBC 10*3/mm3 22.52*   HEMOGLOBIN g/dL 13.7   PLATELETS 10*3/mm3 266     Results from last 7 days   Lab Units 05/26/23  1429    SODIUM mmol/L 131*   POTASSIUM mmol/L 4.1   CHLORIDE mmol/L 95*   CO2 mmol/L 24.0   BUN mg/dL 18   CREATININE mg/dL 1.83*   CALCIUM mg/dL 8.9   GLUCOSE mg/dL 95     Microbiology Results (last 10 days)     Procedure Component Value - Date/Time    Respiratory Panel PCR w/COVID-19(SARS-CoV-2) SURAJ/ZEUS/ENDY/PAD/COR/MAD/RADHA In-House, NP Swab in UTM/VTM, 3-4 HR TAT - Swab, Nasopharynx [[id number]]  (Normal) Collected: 05/26/23 1631    Lab Status: Final result Specimen: Swab from Nasopharynx Updated: 05/26/23 2034     ADENOVIRUS, PCR Not Detected     Coronavirus 229E Not Detected     Coronavirus HKU1 Not Detected     Coronavirus NL63 Not Detected     Coronavirus OC43 Not Detected     COVID19 Not Detected     Human Metapneumovirus Not Detected     Human Rhinovirus/Enterovirus Not Detected     Influenza A PCR Not Detected     Influenza B PCR Not Detected     Parainfluenza Virus 1 Not Detected     Parainfluenza Virus 2 Not Detected     Parainfluenza Virus 3 Not Detected     Parainfluenza Virus 4 Not Detected     RSV, PCR Not Detected     Bordetella pertussis pcr Not Detected     Bordetella parapertussis PCR Not Detected     Chlamydophila pneumoniae PCR Not Detected     Mycoplasma pneumo by PCR Not Detected    Narrative:      In the setting of a positive respiratory panel with a viral infection PLUS a negative procalcitonin without other underlying concern for bacterial infection, consider observing off antibiotics or discontinuation of antibiotics and continue supportive care. If the respiratory panel is positive for atypical bacterial infection (Bordetella pertussis, Chlamydophila pneumoniae, or Mycoplasma pneumoniae), consider antibiotic de-escalation to target atypical bacterial infection.        No radiology results for the last 30 days.  ECG 12 Lead Dyspnea    (Results Pending)     Brief Urine Lab Results     None          Assessment:  Active Hospital Problems    Diagnosis  POA   • **Healthcare-associated pneumonia  [J18.9]  Yes   • A-fib [I48.91]  Unknown   • MURIEL (acute kidney injury) [N17.9]  Unknown   • Acute respiratory failure with hypoxia [J96.01]  Yes   • Chronic interstitial lung disease [J84.9]  Yes   • Primary hypertension [I10]  Yes   • Asthma [J45.909]  Yes       Medical decision making/care plan: See admitting orders  · Pneumonia with progressive hypoxia and exacerbation of underlying asthma and interstitial lung disease-plan is to admit the patient continue with IV fluids oxygen supplementation and IV antibiotics consisting of ceftriaxone and Zithromax with close pulmonary follow-up.  Follow-up on blood cultures.  · Asthma with exacerbation-continue with steroids and nebulizer treatment provided oxygen supplementation and monitor his progress with continuous pulse ox monitoring.  · Acute kidney injury on chronic kidney disease-continue with fluids and avoid nephrotoxic agent and hypotensive episodes.  · Hypertension-continue antihypertensive and avoid hypotensive episodes.  · Chronic interstitial lung disease on immunosuppressive regimen-defer further management to our pulmonary colleagues who are on the case and seen the patient in the emergency room.  Continue with higher dose of prednisone as it was initiated along with methotrexate and Imuran as he is taking at home.  · Atrial fibrillation-currently rate is controlled-continue his current regimen including anticoagulation therapy.      Daniel Kaur MD   5/26/2023  18:33 EDT    Parts of this note may be an electronic transcription/translation of spoken language to printed text using the Dragon dictation system.      Electronically signed by Daniel Kaur MD at 05/27/23 0532          Emergency Department Notes      Brandin Brenner MD at 05/26/23 1339      Procedure Orders    1. Critical Care [[id number]] ordered by Brandin Brenner MD                EMERGENCY DEPARTMENT ENCOUNTER    Room Number:  34/34  Date seen:  5/26/2023  PCP: Niecy Galvan,  MD  Historian: Patient, son at bedside      HPI:  Chief Complaint: Cough, shortness of breath  A complete HPI/ROS/PMH/PSH/SH/FH are unobtainable due to:   Context: Orion Casiano is a 72 y.o. male who presents to the ED c/o cough, shortness of breath.  Patient states has been more short of breath than usual starting about Tuesday.  He has had cough which is occasionally productive of orangeish colored sputum which is unusual for him.  He does report low-grade fever at home.  He reports soreness across the chest with coughing.  Patient is compliant with medications including Eliquis for anticoagulation as well as inhalers and Imuran and prednisone for inflammatory lung disease.      MEDICAL RECORD REVIEW (non ED)  I reviewed prior medical records including hospitalization from February of this year.  Patient has history of inflammatory lung disease and is followed by Dr. Justin Martinez.  He has been treated with Imuran and prednisone.  He does have history of severe pulmonary hypertension.  During that hospitalization he did develop atrial fibrillation and was started on Eliquis for anticoagulation.    PAST MEDICAL HISTORY  Active Ambulatory Problems     Diagnosis Date Noted   • Adenomatous polyp of colon 01/08/2020   • Asthma 05/30/2015   • Chronic pain 02/24/2023   • Mixed hyperlipidemia 02/24/2023   • Primary hypertension 02/24/2023   • Lumbar radiculopathy 02/24/2023   • Acute respiratory failure with hypoxia 02/24/2023   • Chronic interstitial lung disease 02/24/2023   • Elevated troponin 02/24/2023   • New onset atrial fibrillation 02/24/2023   • History of adenomatous polyp of colon 03/24/2023   • Hyperglycemia 05/08/2023     Resolved Ambulatory Problems     Diagnosis Date Noted   • Hyponatremia 02/24/2023     Past Medical History:   Diagnosis Date   • Acute bronchitis    • Allergic rhinitis    • Bacterial pneumonia    • Bilateral lower leg cellulitis    • Colon polyp    • Cutaneous abscess of left lower limb     • Deficiency of other specified B group vitamins    • Dermatitis    • Disc disorder of lumbar region    • Dysuria    • Effusion, right hand    • Essential (primary) hypertension    • Frequency of micturition    • Gout    • Hematuria    • Hyperlipidemia    • Hypertension    • ILD (interstitial lung disease)    • Insomnia    • Localized swelling, mass and lump, unspecified    • Low back pain    • Lumbago    • Microscopic hematuria    • Pain in left knee    • Sleep disturbances    • Vertigo    • Vitamin B12 deficiency          PAST SURGICAL HISTORY  Past Surgical History:   Procedure Laterality Date   • BRONCHOSCOPY  02/27/2023    Procedure: BRONCHOSCOPY WITH FLUORO, BAL, AND BIOPSIES AND ENDOBRONCHIAL ULTRASOUND WITH FNA;  Surgeon: Rubens Sheets MD;  Location: [facility] ENDOSCOPY;  Service: Pulmonary;;  INTERSTITIAL LUNG DISEASE   • CARDIAC CATHETERIZATION N/A 02/24/2023    Procedure: Right Heart Cath;  Surgeon: Ravi Fonseca MD;  Location: [facility] CATH INVASIVE LOCATION;  Service: Cardiovascular;  Laterality: N/A;   • COLONOSCOPY      maybe 2016   • COLONOSCOPY N/A 03/11/2020    Procedure: COLONOSCOPY TO CECUM AND TI WITH COLD AND HOT SNARE AND POLYPECTOMIES;  Surgeon: Patti Navarro MD;  Location: [facility] ENDOSCOPY;  Service: Gastroenterology;  Laterality: N/A;  PRE: H/O COLON POLYPS  POST: POLYPS, HEMORRHOIDS, DIVERTICULOSIS   • LUMBAR DISC SURGERY      2008   • NOSE SURGERY           FAMILY HISTORY  Family History   Problem Relation Age of Onset   • Coronary artery disease Father          SOCIAL HISTORY  Social History     Socioeconomic History   • Marital status:    Tobacco Use   • Smoking status: Never   • Smokeless tobacco: Never   Vaping Use   • Vaping Use: Never used   Substance and Sexual Activity   • Alcohol use: Yes     Comment: occassionally   • Drug use: Never   • Sexual activity: Defer         ALLERGIES  Ibuprofen and Aspirin        REVIEW OF SYSTEMS  Review of Systems    Constitutional: Positive for fatigue and fever.   Respiratory: Positive for cough and shortness of breath.    Cardiovascular: Negative for chest pain.   Musculoskeletal: Positive for arthralgias.   All other systems reviewed and are negative.           PHYSICAL EXAM  ED Triage Vitals   Temp Pulse Resp BP SpO2   05/26/23 1316 -- -- -- 05/26/23 1329   (!) 100.7 °F (38.2 °C)    99 %      Temp src Heart Rate Source Patient Position BP Location FiO2 (%)   05/26/23 1316 -- -- -- --   Temporal           Physical Exam    GENERAL: Alert and pleasant male in no obvious distress.  Triage vitals reviewed notable for elevated temp of 100.7.  O2 sats 99% on 2 L nasal cannula  HENT: nares patent  EYES: no scleral icterus  CV: regular rhythm, regular rate-no murmur  RESPIRATORY: Mildly tachypneic, decreased breath sounds bilaterally-O2 sats upper 90s on 2 L nasal cannula  ABDOMEN: soft, nontender to palpation  MUSCULOSKELETAL: no deformity-no significant swelling or tenderness to palpation  NEURO: Strength sensation and coordination are grossly intact.  Speech and mentation are unremarkable  SKIN: warm, dry      Vital signs and nursing notes reviewed.          LAB RESULTS  Recent Results (from the past 24 hour(s))   Comprehensive Metabolic Panel    Collection Time: 05/26/23  2:29 PM    Specimen: Blood   Result Value Ref Range    Glucose 95 65 - 99 mg/dL    BUN 18 8 - 23 mg/dL    Creatinine 1.83 (H) 0.76 - 1.27 mg/dL    Sodium 131 (L) 136 - 145 mmol/L    Potassium 4.1 3.5 - 5.2 mmol/L    Chloride 95 (L) 98 - 107 mmol/L    CO2 24.0 22.0 - 29.0 mmol/L    Calcium 8.9 8.6 - 10.5 mg/dL    Total Protein 7.4 6.0 - 8.5 g/dL    Albumin 3.5 3.5 - 5.2 g/dL    ALT (SGPT) 50 (H) 1 - 41 U/L    AST (SGOT) 85 (H) 1 - 40 U/L    Alkaline Phosphatase 153 (H) 39 - 117 U/L    Total Bilirubin 5.7 (H) 0.0 - 1.2 mg/dL    Globulin 3.9 gm/dL    A/G Ratio 0.9 g/dL    BUN/Creatinine Ratio 9.8 7.0 - 25.0    Anion Gap 12.0 5.0 - 15.0 mmol/L    eGFR 38.7 (L)  >60.0 mL/min/1.73   Lactic Acid, Plasma    Collection Time: 05/26/23  2:29 PM    Specimen: Blood   Result Value Ref Range    Lactate 2.8 (C) 0.5 - 2.0 mmol/L   Procalcitonin    Collection Time: 05/26/23  2:29 PM    Specimen: Blood   Result Value Ref Range    Procalcitonin 9.19 (H) 0.00 - 0.25 ng/mL   CBC Auto Differential    Collection Time: 05/26/23  2:29 PM    Specimen: Blood   Result Value Ref Range    WBC 22.52 (H) 3.40 - 10.80 10*3/mm3    RBC 4.28 4.14 - 5.80 10*6/mm3    Hemoglobin 13.7 13.0 - 17.7 g/dL    Hematocrit 40.2 37.5 - 51.0 %    MCV 93.9 79.0 - 97.0 fL    MCH 32.0 26.6 - 33.0 pg    MCHC 34.1 31.5 - 35.7 g/dL    RDW 14.0 12.3 - 15.4 %    RDW-SD 47.5 37.0 - 54.0 fl    MPV 10.6 6.0 - 12.0 fL    Platelets 266 140 - 450 10*3/mm3   Manual Differential    Collection Time: 05/26/23  2:29 PM    Specimen: Blood   Result Value Ref Range    Neutrophil % 82.4 (H) 42.7 - 76.0 %    Lymphocyte % 8.2 (L) 19.6 - 45.3 %    Monocyte % 8.2 5.0 - 12.0 %    Eosinophil % 1.2 0.3 - 6.2 %    Neutrophils Absolute 18.56 (H) 1.70 - 7.00 10*3/mm3    Lymphocytes Absolute 1.85 0.70 - 3.10 10*3/mm3    Monocytes Absolute 1.85 (H) 0.10 - 0.90 10*3/mm3    Eosinophils Absolute 0.27 0.00 - 0.40 10*3/mm3    Anisocytosis Mod/2+ None Seen    Macrocytes Mod/2+ None Seen    Smudge Cells Large/3+ None Seen    Platelet Morphology Normal Normal   Blood Gas, Arterial -    Collection Time: 05/26/23  3:26 PM    Specimen: Arterial Blood   Result Value Ref Range    Site Arterial: right radial     Tito's Test Positive     pH, Arterial 7.468 (H) 7.350 - 7.450 pH units    pCO2, Arterial 33.7 (L) 35.0 - 45.0 mm Hg    pO2, Arterial 50.9 (C) 80.0 - 100.0 mm Hg    HCO3, Arterial 24.4 22.0 - 28.0 mmol/L    Base Excess, Arterial 1.3 0.0 - 2.0 mmol/L    O2 Saturation Calculated 88.2 (L) 92.0 - 99.0 %    Barometric Pressure for Blood Gas 752.9 mmHg    Modality Cannula     Flow Rate 1 lpm    Rate 18 Breaths/minute       Ordered the above labs and independently  interpreted results. My findings will be discussed in the medical decision making section below        RADIOLOGY  XR Chest 1 View    Result Date: 5/26/2023  XR CHEST 1 VW-  HISTORY:  Fever, shortness of breath.  COMPARISON:  Chest radiograph 02/25/2023  FINDINGS:  A single view of the chest was obtained. The cardiac silhouette and mediastinal and hilar contours are not significantly changed. There are low lung volumes. There are diffuse airspace opacities throughout the right lung, progressed from 02/25/2023, concerning for pneumonia in the appropriate clinical setting. Short-term follow-up imaging is recommended. There is no large pleural effusion. There is incompletely assessed fusion hardware in the cervical spine.  This report was finalized on 5/26/2023 2:55 PM by Dr. Caryn Frost M.D.        I ordered and independently reviewed the above noted radiographic studies.      I viewed images of chest x-ray which showed worsening right-sided opacities per my independent interpretation.    See radiologist's dictation for official interpretation.             PROCEDURES  Critical Care  Performed by: Brandin Brenner MD  Authorized by: Brandin Brenner MD     Critical care provider statement:     Critical care time (minutes):  35    Critical care was necessary to treat or prevent imminent or life-threatening deterioration of the following conditions:  Respiratory failure    Critical care was time spent personally by me on the following activities:  Ordering and review of laboratory studies, ordering and review of radiographic studies, pulse oximetry, re-evaluation of patient's condition, review of old charts, evaluation of patient's response to treatment, discussions with consultants, development of treatment plan with patient or surrogate, obtaining history from patient or surrogate and examination of patient              MEDICATIONS GIVEN IN ER  Medications   cefepime 2 gm IVPB in 100 ml NS (VTB) (has no administration  in time range)   vancomycin IVPB 2000 mg in 0.9% Sodium Chloride (premix) 500 mL (has no administration in time range)   sodium chloride 0.9 % bolus 1,000 mL (has no administration in time range)   ipratropium-albuterol (DUO-NEB) nebulizer solution 3 mL (has no administration in time range)   methylPREDNISolone sodium succinate (SOLU-Medrol) injection 125 mg (has no administration in time range)   predniSONE (DELTASONE) tablet 40 mg (has no administration in time range)               MEDICAL DECISION MAKING, PROGRESS, and CONSULTS    All labs have been independently reviewed by me.  All radiology studies have been reviewed by me and I have also reviewed the radiology report.   EKG's independently viewed and interpreted by me.  Discussion below represents my analysis of pertinent findings related to patient's condition, differential diagnosis, treatment plan and final disposition.      Additional sources:  - Discussed/ obtained information from independent historians: Son at bedside, EMS    - External (non-ED) record review: Please see documented above    - Chronic or social conditions impacting care: Chronic interstitial lung disease, atrial fibrillation, immunosuppression on Imuran and prednisone    - Shared decision making: I discussed ED evaluation and treatment plan with patient who is in agreement.  Complicated patient with increased shortness of breath.  He is febrile and x-ray suggests possible acute bacterial infection.    We will cover with cefepime and vancomycin.  Will admit to the hospital with pulmonary consultation.      Orders placed during this visit:  Orders Placed This Encounter   Procedures   • Critical Care   • Blood Culture - Blood,   • Blood Culture - Blood,   • Respiratory Culture - Sputum, Cough   • Respiratory Panel PCR w/COVID-19(SARS-CoV-2) SURAJ/ZEUS/ENDY/PAD/COR/MAD/RADHA In-House, NP Swab in UTM/VTM, 3-4 HR TAT - Swab, Nasopharynx   • XR Chest 1 View   • Comprehensive Metabolic Panel   •  Lactic Acid, Plasma   • Procalcitonin   • CBC Auto Differential   • Manual Differential   • STAT Lactic Acid, Reflex   • Blood Gas, Arterial -   • Blood Gas, Arterial -   • L[facility] (on-call MD unless specified) Details   • Pulmonology (on-call MD unless specified)   • ECG 12 Lead Dyspnea   • Inpatient Admission   • CBC & Differential           Differential diagnosis:    Please see as documented below in ED course      Independent interpretation of labs, radiology studies, and discussions with consultants:  ED Course as of 05/26/23 1600   Fri May 26, 2023   1341 EKG independently interpreted by me    Time 1:38 PM    Sinus tachycardia 107  Normal P waves and SC intervals  QRS-normal axis, unremarkable QRS  ST, T waves-nonspecific changes    When compared to 2/2023, A-fib is no longer present [DB]   [age] [id number]-[id number]-old male with history of inflammatory lung disease, recent A-fib presents with worsening shortness of breath and productive cough.    On exam he is relatively well-appearing but febrile with temp of 100.7.  O2 sats upper 90s on 2 L nasal cannula.  He is mildly tachypneic but I do not hear Rales or obvious infiltrate on his pulmonary exam.    Assessment-most likely dealing with upper respiratory infection in a patient with severe lung disease.  Would consider heart failure or dysrhythmia in a patient with recent A-fib.  Certainly could consider hepatic or renal disease given his comorbidities and age.  Consider anemia given his anticoagulation status.    Most likely we are dealing however with a respiratory infection and will get viral panel, blood cultures as well as procalcitonin and lactic acid.  Anticipate likely admission given patient's fever and comorbidities. [DB]   1504 Chest x-ray independently interpreted by me shows evidence of cardiomegaly.  There is diffuse interstitial opacities right greater than left concerning for possible pneumonia.    Official radiology interpretation is in agreement with  this assessment. [DB]   1515 I spoke with Dr. Kaur will admit on behalf of [facility].  He did request that we do an ABG which we will go ahead and accomplish. [DB]   1516 Patient with elevated white blood cell count lactic acid suggestive of pneumonia.  We will go ahead and cover with Vanco and cefepime given patient's multiple comorbidities. [DB]   1523 I discussed treatment and evaluation this patient with Dr. Rolando León who will see in consultation from pulmonary service. [DB]      ED Course User Index  [DB] Brandin Brenner MD             I used full protective equipment while examining this patient.  This includes face mask, gloves and protective eyewear.  I washed my hands before entering the room and immediately upon leaving the room    DIAGNOSIS  Final diagnoses:   Healthcare-associated pneumonia   Chronic interstitial lung disease   Paroxysmal atrial fibrillation   Anticoagulated by anticoagulation treatment         DISPOSITION  Admission            Latest Documented Vital Signs:  As of 16:00 EDT  BP- 126/72 HR- 109 Temp- (!) 100.7 °F (38.2 °C) (Temporal) O2 sat- 99%              --    Please note that portions of this were completed with a voice recognition program.       Note Disclaimer: At [facility], we believe that sharing information builds trust and better relationships. You are receiving this note because you are receiving care at [facility] or recently visited. It is possible you will see health information before a provider has talked with you about it. This kind of information can be easy to misunderstand. To help you fully understand what it means for your health, we urge you to discuss this note with your provider.           Brandin Brenner MD  05/26/23 1600      Electronically signed by Brandin Brenner MD at 05/26/23 1600     Jt Swanson, RN at 05/26/23 1528          .Nursing report ED to floor  Orion Casiano  72 y.o.  male    HPI :   Chief Complaint   Patient presents with   •  "Weakness - Generalized   • Shortness of Breath       Admitting doctor:   Daniel Kaur MD    Admitting diagnosis:   The primary encounter diagnosis was Healthcare-associated pneumonia. Diagnoses of Chronic interstitial lung disease, Paroxysmal atrial fibrillation, and Anticoagulated by anticoagulation treatment were also pertinent to this visit.    Code status:   Current Code Status       Date Active Code Status Order ID Comments User Context       Prior            Allergies:   Ibuprofen and Aspirin    Isolation:   No active isolations    Intake and Output  No intake or output data in the 24 hours ending 05/26/23 1528    Weight:       05/26/23  1339   Weight: 103 kg (228 lb)       Most recent vitals:   Vitals:    05/26/23 1339 05/26/23 1341 05/26/23 1402 05/26/23 1435   BP: 126/72 126/72     Pulse:  110 105 110   Resp:   26    Temp:       TempSrc:       SpO2:  99%  95%   Weight: 103 kg (228 lb)      Height: 186.7 cm (73.5\")          Active LDAs/IV Access:   Lines, Drains & Airways       Active LDAs       Name Placement date Placement time Site Days    Peripheral IV 05/26/23 1431 Right Antecubital 05/26/23  1431  Antecubital  less than 1                    Labs (abnormal labs have a star):   Labs Reviewed   LACTIC ACID, PLASMA - Abnormal; Notable for the following components:       Result Value    Lactate 2.8 (*)     All other components within normal limits   CBC WITH AUTO DIFFERENTIAL - Abnormal; Notable for the following components:    WBC 22.52 (*)     All other components within normal limits   MANUAL DIFFERENTIAL - Abnormal; Notable for the following components:    Neutrophil % 82.4 (*)     Lymphocyte % 8.2 (*)     Neutrophils Absolute 18.56 (*)     Monocytes Absolute 1.85 (*)     All other components within normal limits   BLOOD CULTURE   BLOOD CULTURE   RESPIRATORY CULTURE   BLOOD GAS, ARTERIAL   COMPREHENSIVE METABOLIC PANEL   PROCALCITONIN   LACTIC ACID, REFLEX   CBC AND DIFFERENTIAL    Narrative:     The " following orders were created for panel order CBC & Differential.  Procedure                               Abnormality         Status                     ---------                               -----------         ------                     CBC Auto Differential[[id number]]        Abnormal            Final result                 Please view results for these tests on the individual orders.       EKG:   ECG 12 Lead Dyspnea    (Results Pending)       Meds given in ED:   Medications   cefepime 2 gm IVPB in 100 ml NS (VTB) (has no administration in time range)   vancomycin IVPB 2000 mg in 0.9% Sodium Chloride (premix) 500 mL (has no administration in time range)       Imaging results:  No radiology results for the last day    Ambulatory status:   - BR    Social issues:   Social History     Socioeconomic History   • Marital status:    Tobacco Use   • Smoking status: Never   • Smokeless tobacco: Never   Vaping Use   • Vaping Use: Never used   Substance and Sexual Activity   • Alcohol use: Yes     Comment: occassionally   • Drug use: Never   • Sexual activity: Defer         Jt Swanson RN  05/26/23 15:28 EDT         Electronically signed by Jt Swanson RN at 05/26/23 1528       Vital Signs (last day)     Date/Time Temp Temp src Pulse Resp BP Patient Position SpO2    05/27/23 1508 -- -- 111 20 -- -- 92    05/27/23 1300 98 (36.7) -- -- -- -- -- --    05/27/23 1226 -- Oral 111 18 144/92 Lying 90    05/27/23 1036 -- -- 116 18 -- -- 95    05/27/23 0934 -- -- 118 -- -- -- --    05/27/23 0853 -- -- 156 -- -- -- --    05/27/23 0810 -- -- 116 -- -- -- --    05/27/23 0702 97.8 (36.6) Oral 119 18 155/93 Lying 91    05/27/23 0651 -- -- 109 18 -- -- 92    05/26/23 2304 98.9 (37.2) Oral 97 22 154/84 Sitting 91    05/26/23 2242 -- -- 92 22 -- -- 92    05/26/23 2146 98.8 (37.1) Oral 98 20 154/84 Lying 90    05/26/23 1920 -- -- 102 -- -- -- 93    05/26/23 1640 -- -- 106 -- -- -- 100    05/26/23 1630 -- -- -- -- 118/76 -- --     05/26/23 1506 -- -- 109 -- -- -- 100    05/26/23 1458 -- -- 109 -- -- -- 99    05/26/23 1435 -- -- 110 -- -- -- 95    05/26/23 1402 -- -- 105 26 -- -- --    05/26/23 1341 -- -- 110 -- 126/72 -- 99    05/26/23 1339 -- -- -- -- 126/72 -- --    05/26/23 1329 -- -- -- -- -- -- 99    05/26/23 1316 100.7 (38.2) Temporal -- -- -- -- --          Prior to Admission Medications     Prescriptions Last Dose Informant Patient Reported? Taking?    albuterol sulfate  (90 Base) MCG/ACT inhaler 5/26/2023  No Yes    Inhale 2 puffs Every 6 (Six) Hours As Needed for Wheezing.    allopurinol (ZYLOPRIM) 100 MG tablet 5/26/2023  No Yes    TAKE ONE TABLET BY MOUTH DAILY    amLODIPine (NORVASC) 10 MG tablet 5/26/2023  Yes Yes    Take  by mouth Daily.    azaTHIOprine (IMURAN) 50 MG tablet 5/26/2023  Yes Yes    Take  by mouth Daily.    Azelastine HCl 137 MCG/SPRAY solution 5/26/2023  No Yes    PLACE 2 SPRAYS IN EACH NOSTRIL TWO TIMES A DAY    clobetasol (TEMOVATE) 0.05 % ointment 5/26/2023  Yes Yes    doxepin (SINEquan) 25 MG capsule 5/26/2023  Yes Yes    doxycycline (VIBRAMYCIN) 100 MG capsule 5/26/2023  Yes Yes    Take 1 capsule by mouth 2 (Two) Times a Day.    Eliquis 5 MG tablet tablet 5/26/2023  No Yes    TAKE ONE TABLET BY MOUTH TWICE A DAY    flunisolide (NASALIDE) 25 MCG/ACT (0.025%) solution nasal spray 5/26/2023  Yes Yes    Fluticasone Furoate-Vilanterol (BREO ELLIPTA) 200-25 MCG/ACT inhaler 5/26/2023  Yes Yes    Daily.    hydrocortisone 2.5 % ointment 5/26/2023  Yes Yes    ipratropium-albuterol (DUO-NEB) 0.5-2.5 mg/3 ml nebulizer 5/26/2023  No Yes    Inhale the contents of 1 vial by nebulization 4 (Four) Times a Day.    ketoconazole (NIZORAL) 2 % shampoo 5/26/2023  Yes Yes    lisinopril (PRINIVIL,ZESTRIL) 10 MG tablet 5/26/2023  Yes Yes    Take 1 tablet by mouth Daily.    methocarbamol (ROBAXIN) 500 MG tablet 5/26/2023  No Yes    TAKE ONE TABLET BY MOUTH FOUR TIMES A DAY AS NEEDED FOR BACK PAIN    methotrexate 2.5 MG  tablet 5/26/2023  Yes Yes    montelukast (Singulair) 10 MG tablet 5/26/2023  No Yes    Take 1 tablet by mouth Every Night.    rosuvastatin (CRESTOR) 10 MG tablet 5/26/2023  Yes Yes    Take  by mouth Daily.    tadalafil (CIALIS) 20 MG tablet 5/26/2023  Yes Yes    traZODone (DESYREL) 100 MG tablet 5/26/2023  Yes Yes    Take 1 tablet by mouth Daily.    furosemide (LASIX) 20 MG tablet   No No    TAKE ONE TABLET BY MOUTH DAILY          Facility-Administered Medications as of 5/27/2023   Medication Dose Route Frequency Provider Last Rate Last Admin   • acetaminophen (TYLENOL) tablet 650 mg  650 mg Oral Q4H PRN Daniel Kaur MD        Or   • acetaminophen (TYLENOL) 160 MG/5ML solution 650 mg  650 mg Oral Q4H PRN Daniel Kaur MD        Or   • acetaminophen (TYLENOL) suppository 650 mg  650 mg Rectal Q4H PRN Daniel Kaur MD       • albuterol (PROVENTIL) nebulizer solution 0.083% 2.5 mg/3mL  2.5 mg Nebulization Q6H PRN Daniel Kaur MD       • allopurinol (ZYLOPRIM) tablet 100 mg  100 mg Oral Daily Daniel Kaur MD   100 mg at 05/27/23 0914   • apixaban (ELIQUIS) tablet 5 mg  5 mg Oral BID Daniel Kaur MD   5 mg at 05/27/23 0914   • Azelastine HCl solution 274 mcg  2 spray Each Nare BID Daniel Kaur MD   274 mcg at 05/27/23 0914   • azithromycin (ZITHROMAX) 500 mg in sodium chloride 0.9 % 250 mL IVPB-VTB  500 mg Intravenous Q24H Daniel Kaur MD   500 mg at 05/27/23 0915   • sennosides-docusate (PERICOLACE) 8.6-50 MG per tablet 2 tablet  2 tablet Oral BID Daniel Kaur MD        And   • polyethylene glycol (MIRALAX) packet 17 g  17 g Oral Daily PRN Daniel Kaur MD        And   • bisacodyl (DULCOLAX) EC tablet 5 mg  5 mg Oral Daily PRN Daniel Kaur MD        And   • bisacodyl (DULCOLAX) suppository 10 mg  10 mg Rectal Daily PRN Daniel Kaur MD       • [COMPLETED] cefepime 2 gm IVPB in 100 ml NS (VTB)  2 g Intravenous Once [place]   Stopped at 05/26/23 1720   • cefTRIAXone (ROCEPHIN) 1 g in sodium chloride  0.9 % 100 mL IVPB-VTB  1 g Intravenous Q24H Daniel Kaur  mL/hr at 05/27/23 0528 1 g at 05/27/23 0528   • ipratropium-albuterol (DUO-NEB) nebulizer solution 3 mL  3 mL Nebulization 4x Daily - RT Daniel Kaur MD   3 mL at 05/27/23 1508   • [START ON 5/30/2023] methotrexate tablet 2.5 mg  2.5 mg Oral Weekly Daniel Kaur MD       • [COMPLETED] methylPREDNISolone sodium succinate (SOLU-Medrol) injection 125 mg  125 mg Intravenous Once Rubens Sheets MD   125 mg at 05/26/23 1635   • montelukast (SINGULAIR) tablet 10 mg  10 mg Oral Nightly Daniel Kaur MD       • nitroglycerin (NITROSTAT) SL tablet 0.4 mg  0.4 mg Sublingual Q5 Min PRN Daniel Kaur MD       • ondansetron (ZOFRAN) injection 4 mg  4 mg Intravenous Q6H PRN Daniel Kaur MD       • predniSONE (DELTASONE) tablet 40 mg  40 mg Oral Daily With Breakfast Daniel Kaur MD   40 mg at 05/27/23 0914   • rosuvastatin (CRESTOR) tablet 10 mg  10 mg Oral Daily Daniel Kaur MD   10 mg at 05/27/23 0914   • [COMPLETED] sodium chloride 0.9 % bolus 1,000 mL  1,000 mL Intravenous Once [place], Brandin ANGUIANO MD   Stopped at 05/26/23 1727   • sodium chloride 0.9 % flush 10 mL  10 mL Intravenous Q12H Daniel Kaur MD   10 mL at 05/26/23 2328   • sodium chloride 0.9 % flush 10 mL  10 mL Intravenous PRN Daniel Kaur MD       • sodium chloride 0.9 % infusion 40 mL  40 mL Intravenous PRN Daniel Kaur MD       • traZODone (DESYREL) tablet 100 mg  100 mg Oral Daily Daniel Kaur MD   100 mg at 05/27/23 0914   • [COMPLETED] vancomycin IVPB 2000 mg in 0.9% Sodium Chloride (premix) 500 mL  20 mg/kg Intravenous Once [place], Brandin ANGUIANO MD   2,000 mg at 05/26/23 1727         Lab Results (last 24 hours)     Procedure Component Value Units Date/Time    S. Pneumo Ag Urine or CSF - Urine, Urine, Clean Catch [[id number]] Collected: 05/27/23 1929    Specimen: Urine, Clean Catch Updated: 05/27/23 1932    Legionella Antigen, Urine - Urine, Urine, Clean Catch [[id number]] Collected: 05/27/23 1929     Specimen: Urine, Clean Catch Updated: 05/27/23 1932    Respiratory Culture - Sputum, Cough [[id number]] Collected: 05/26/23 1430    Specimen: Sputum from Cough Updated: 05/27/23 1717     Respiratory Culture Heavy growth (4+) The culture consists of normal respiratory alyce. This is a preliminary report; final report to follow.     Gram Stain Moderate (3+) WBCs seen      Rare (1+) Epithelial cells per low power field      Mixed bacterial morphotypes seen on Gram Stain    Blood Culture - Blood, Arm, Left [[id number]]  (Normal) Collected: 05/26/23 1524    Specimen: Blood from Arm, Left Updated: 05/27/23 1532     Blood Culture No growth at 24 hours    Blood Culture - Blood, Arm, Left [[id number]]  (Normal) Collected: 05/26/23 1429    Specimen: Blood from Arm, Left Updated: 05/27/23 1446     Blood Culture No growth at 24 hours    Manual Differential [[id number]]  (Abnormal) Collected: 05/27/23 0627    Specimen: Blood Updated: 05/27/23 0754     Neutrophil % 92.0 %      Lymphocyte % 6.0 %      Monocyte % 2.0 %      Neutrophils Absolute 17.65 10*3/mm3      Lymphocytes Absolute 1.15 10*3/mm3      Monocytes Absolute 0.38 10*3/mm3      Poikilocytes Mod/2+     WBC Morphology Normal     Platelet Morphology Normal    CBC Auto Differential [[id number]]  (Abnormal) Collected: 05/27/23 0627    Specimen: Blood Updated: 05/27/23 0754     WBC 19.18 10*3/mm3      RBC 4.19 10*6/mm3      Hemoglobin 13.4 g/dL      Hematocrit 39.2 %      MCV 93.6 fL      MCH 32.0 pg      MCHC 34.2 g/dL      RDW 13.9 %      RDW-SD 46.4 fl      MPV 10.6 fL      Platelets 270 10*3/mm3     Comprehensive Metabolic Panel [[id number]]  (Abnormal) Collected: 05/27/23 0627    Specimen: Blood Updated: 05/27/23 0712     Glucose 113 mg/dL      BUN 25 mg/dL      Creatinine 1.39 mg/dL      Sodium 133 mmol/L      Potassium 4.6 mmol/L      Chloride 99 mmol/L      CO2 21.8 mmol/L      Calcium 9.6 mg/dL      Total Protein 7.2 g/dL      Albumin 2.7 g/dL      ALT (SGPT) 55 U/L       AST (SGOT) 81 U/L      Alkaline Phosphatase 146 U/L      Total Bilirubin 4.2 mg/dL      Globulin 4.5 gm/dL      A/G Ratio 0.6 g/dL      BUN/Creatinine Ratio 18.0     Anion Gap 12.2 mmol/L      eGFR 53.9 mL/min/1.73     Narrative:      GFR Normal >60  Chronic Kidney Disease <60  Kidney Failure <15    The GFR formula is only valid for adults with stable renal function between ages 18 and 70.    Respiratory Panel PCR w/COVID-19(SARS-CoV-2) SURAJ/ZEUS/ENDY/PAD/COR/MAD/RADHA In-House, NP Swab in UTM/VTM, 3-4 HR TAT - Swab, Nasopharynx [[id number]]  (Normal) Collected: 05/26/23 1631    Specimen: Swab from Nasopharynx Updated: 05/26/23 2034     ADENOVIRUS, PCR Not Detected     Coronavirus 229E Not Detected     Coronavirus HKU1 Not Detected     Coronavirus NL63 Not Detected     Coronavirus OC43 Not Detected     COVID19 Not Detected     Human Metapneumovirus Not Detected     Human Rhinovirus/Enterovirus Not Detected     Influenza A PCR Not Detected     Influenza B PCR Not Detected     Parainfluenza Virus 1 Not Detected     Parainfluenza Virus 2 Not Detected     Parainfluenza Virus 3 Not Detected     Parainfluenza Virus 4 Not Detected     RSV, PCR Not Detected     Bordetella pertussis pcr Not Detected     Bordetella parapertussis PCR Not Detected     Chlamydophila pneumoniae PCR Not Detected     Mycoplasma pneumo by PCR Not Detected    Narrative:      In the setting of a positive respiratory panel with a viral infection PLUS a negative procalcitonin without other underlying concern for bacterial infection, consider observing off antibiotics or discontinuation of antibiotics and continue supportive care. If the respiratory panel is positive for atypical bacterial infection (Bordetella pertussis, Chlamydophila pneumoniae, or Mycoplasma pneumoniae), consider antibiotic de-escalation to target atypical bacterial infection.        Imaging Results (Last 24 Hours)     ** No results found for the last 24 hours. **        ECG/EMG Results  (last 24 hours)     ** No results found for the last 24 hours. **           Physician Progress Notes (last 24 hours)      Roderick Basilio MD at 23 1720              Name: Orion Casiano ADMIT: 2023   : 1950  PCP: Niecy Galvan MD    MRN: [id number] LOS: 1 days   AGE/SEX: 72 y.o. male  ROOM: [facility]     Subjective   Subjective     Patient is seen at bedside, no new complaints.        Objective   Objective   Vital Signs  Temp:  [97.8 °F (36.6 °C)-98.9 °F (37.2 °C)] 98 °F (36.7 °C)  Heart Rate:  [] 111  Resp:  [18-22] 20  BP: (144-155)/(84-93) 144/92  SpO2:  [90 %-95 %] 92 %  on  Flow (L/min):  [3-5] 5;   Device (Oxygen Therapy): nasal cannula  Body mass index is 30.05 kg/m².  Physical Exam   General, awake and alert.  Head and ENT, normocephalic and atraumatic.  Lungs, symmetric expansion, equal air entry bilaterally.  Heart, regular rate and rhythm.  Abdomen, soft and nontender.  Extremities, no clubbing or cyanosis.  Neuro, no focal deficits.  Skin: Warm and no rash.  Psych, normal mood and affect.  Musculoskeletal, joint examination is grossly normal.        Results Review     I reviewed the patient's new clinical results.  Results from last 7 days   Lab Units 23  0623  1429   WBC 10*3/mm3 19.18* 22.52*   HEMOGLOBIN g/dL 13.4 13.7   PLATELETS 10*3/mm3 270 266     Results from last 7 days   Lab Units 23  0623  1429   SODIUM mmol/L 133* 131*   POTASSIUM mmol/L 4.6 4.1   CHLORIDE mmol/L 99 95*   CO2 mmol/L 21.8* 24.0   BUN mg/dL 25* 18   CREATININE mg/dL 1.39* 1.83*   GLUCOSE mg/dL 113* 95   EGFR mL/min/1.73 53.9* 38.7*     Results from last 7 days   Lab Units 23  0623  1429   ALBUMIN g/dL 2.7* 3.5   BILIRUBIN mg/dL 4.2* 5.7*   ALK PHOS U/L 146* 153*   AST (SGOT) U/L 81* 85*   ALT (SGPT) U/L 55* 50*     Results from last 7 days   Lab Units 23  0627 23  1429   CALCIUM mg/dL 9.6 8.9   ALBUMIN g/dL 2.7* 3.5     Results from  last 7 days   Lab Units 05/26/23  1727 05/26/23  1429   PROCALCITONIN ng/mL  --  9.19*   LACTATE mmol/L 2.0 2.8*     No results found for: HGBA1C, POCGLU    No radiology results for the last day  I have personally reviewed all medications:  Scheduled Medications  allopurinol, 100 mg, Oral, Daily  apixaban, 5 mg, Oral, BID  Azelastine HCl, 2 spray, Each Nare, BID  azithromycin, 500 mg, Intravenous, Q24H  cefTRIAXone, 1 g, Intravenous, Q24H  ipratropium-albuterol, 3 mL, Nebulization, 4x Daily - RT  [START ON 5/30/2023] methotrexate, 2.5 mg, Oral, Weekly  montelukast, 10 mg, Oral, Nightly  predniSONE, 40 mg, Oral, Daily With Breakfast  rosuvastatin, 10 mg, Oral, Daily  senna-docusate sodium, 2 tablet, Oral, BID  sodium chloride, 10 mL, Intravenous, Q12H  traZODone, 100 mg, Oral, Daily    Infusions   Diet  Diet: Renal Diets; Low Sodium (2-3g), Low Potassium, Low Phosphorus; Texture: Regular Texture (IDDSI 7); Fluid Consistency: Thin (IDDSI 0)    I have personally reviewed:  [x]  Laboratory   [x]  Microbiology   [x]  Radiology   [x]  EKG/Telemetry  [x]  Cardiology/Vascular   []  Pathology    []  Records      Assessment/Plan     Active Hospital Problems    Diagnosis  POA   • **Healthcare-associated pneumonia [J18.9]  Yes   • A-fib [I48.91]  Unknown   • MURIEL (acute kidney injury) [N17.9]  Unknown   • Acute respiratory failure with hypoxia [J96.01]  Yes   • Chronic interstitial lung disease [J84.9]  Yes   • Primary hypertension [I10]  Yes   • Asthma [J45.909]  Yes      Resolved Hospital Problems   No resolved problems to display.       72 y.o. male admitted with Healthcare-associated pneumonia.    Assessment and plan:  1.  Acute hypoxemic respiratory failure, with asthma exacerbation and pneumonia.  Patient has underlying interstitial lung disease, continue to follow pulmonary recommendations.  Continue supplemental oxygen.  Continue Rocephin and Zithromax.    2.  Asthma exacerbation, management as above.    3.  Acute on  "chronic kidney injury, renal function is improving.    4.  Hypertension, continue to monitor BP trend.    5.  Atrial fibrillation, patient is currently rate controlled.  He is on anticoagulation with Eliquis.    6.  CODE STATUS is full code.  Further plans based on hospital course.    Roderick Basilio MD  [facility]  05/27/23  17:20 EDT      Electronically signed by Roderick Basilio MD at 05/27/23 1723     Rubens Sheets MD at 05/27/23 1629                                                        LOS: 1 day   Patient Care Team:  Niecy Galvan MD as PCP - General (Family Medicine)  Patti Navarro MD as Consulting Physician (Gastroenterology)    Chief Complaint:  F/up pneumonia, respiratory failure and ILD.    Subjective   Interval History  Continues to have shortness of breath, worse with minimal activities.  On 5 L oxygen.    Mild cough, no sputum.    REVIEW OF SYSTEMS:     GASTROINTESTINAL: No more diarrhea.  No nausea or vomiting.  CONSTITUTIONAL: No fever or chills.     Ventilator/Non-Invasive Ventilation Settings (From admission, onward)    None                Physical Exam:     Vital Signs  Temp:  [97.8 °F (36.6 °C)-98.9 °F (37.2 °C)] 98 °F (36.7 °C)  Heart Rate:  [] 111  Resp:  [18-22] 20  BP: (144-155)/(84-93) 144/92    Intake/Output Summary (Last 24 hours) at 5/27/2023 1630  Last data filed at 5/27/2023 0810  Gross per 24 hour   Intake 1880 ml   Output --   Net 1880 ml     Flowsheet Rows    Flowsheet Row First Filed Value   Admission Height 186.7 cm (73.5\") Documented at 05/26/2023 1339   Admission Weight 103 kg (228 lb) Documented at 05/26/2023 1339          PPE used per hospital policy    General Appearance:   Alert, cooperative, in no acute distress   ENMT:  Mallampati score 3, moist mucous membrane   Eyes:  Pupils equal and reactive to light. EOMI   Neck:   Large. Trachea midline. No thyromegaly.   Lungs:    Crackles on the right, diffuse.  Mild expiratory " wheezing.  No labored breathing.    Heart:   Regular rhythm and normal rate, normal S1 and S2, no         murmur   Skin:   No rash or ecchymosis   Abdomen:    Obese. Soft. No tenderness. No HSM.   Neuro/psych:  Conscious, alert, oriented x3. Strength 5/5 in upper and lower  ext.  Appropriate mood and affect   Extremities:  No cyanosis, clubbing or edema.  Warm extremities and well-perfused          Results Review:        Results from last 7 days   Lab Units 05/27/23  0627 05/26/23  1429   SODIUM mmol/L 133* 131*   POTASSIUM mmol/L 4.6 4.1   CHLORIDE mmol/L 99 95*   CO2 mmol/L 21.8* 24.0   BUN mg/dL 25* 18   CREATININE mg/dL 1.39* 1.83*   GLUCOSE mg/dL 113* 95   CALCIUM mg/dL 9.6 8.9         Results from last 7 days   Lab Units 05/27/23  0627 05/26/23  1429   WBC 10*3/mm3 19.18* 22.52*   HEMOGLOBIN g/dL 13.4 13.7   HEMATOCRIT % 39.2 40.2   PLATELETS 10*3/mm3 270 266                           Results from last 7 days   Lab Units 05/26/23  1526   PH, ARTERIAL pH units 7.468*   PCO2, ARTERIAL mm Hg 33.7*   PO2 ART mm Hg 50.9*   FLOW RATE lpm 1   MODALITY  Cannula   O2 SATURATION CALC % 88.2*         I reviewed the patient's new clinical results.        Medication Review:   allopurinol, 100 mg, Oral, Daily  apixaban, 5 mg, Oral, BID  Azelastine HCl, 2 spray, Each Nare, BID  azithromycin, 500 mg, Intravenous, Q24H  cefTRIAXone, 1 g, Intravenous, Q24H  ipratropium-albuterol, 3 mL, Nebulization, 4x Daily - RT  [START ON 5/30/2023] methotrexate, 2.5 mg, Oral, Weekly  montelukast, 10 mg, Oral, Nightly  predniSONE, 40 mg, Oral, Daily With Breakfast  rosuvastatin, 10 mg, Oral, Daily  senna-docusate sodium, 2 tablet, Oral, BID  sodium chloride, 10 mL, Intravenous, Q12H  traZODone, 100 mg, Oral, Daily            Assessment     1. Right pneumonia  2. ILD, unspecified seems that he has mild fibrotic changes as well consistent with honeycombing but no typical UIP.  Increase in the GG infiltrates in the right lower lobe on latest CT  chest 2/22/2023 compared to 7/8/2022.  S/p bronch with BAL and TBB 2/27, unrevealing with the exception of histiocytes and eosinophils on the BAL.  3. 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  Stability confirmed at least when compared to exam done 7/8/2022..  Suspect it is reactive secondary to ILD.  S/p TB NA 2/27 -> histiocytes  4. Pulmonary hypertension, moderate. mPAP 34, PCWP 19- Transpulmonary P: 15.  Consistent with group 2 and group 3.  5. Asthma exacerbation  6. 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 Immunosuppression: On Imuran 150 mg and prednisone 10 mg daily     8. Atrial fibrillation  9. Shortness of breath, secondary to the above      Plan     · Antibiotics with Rocephin and azithromycin  · Oxygen by NC and titrate keep SPO2 >90%  · Prednisone 40 mg daily.  Imuran on hold now due to pneumonia.  Can be resumed later on when patient recovers from the pneumonia.  · Check urine strep and Legionella antigen  · VTE prophylaxis: Eliquis.        Rubens Sheets MD  05/27/23  16:30 EDT          This note was dictated utilizing Dragon dictation    Electronically signed by Rubens Sheets MD at 05/27/23 1632       Consult Notes (last 24 hours)  Notes from 05/26/23 1951 through 05/27/23 1951   No notes of this type exist for this encounter.         Physical Therapy Notes (last 24 hours)  Notes from 05/26/23 1951 through 05/27/23 1951   No notes exist for this encounter.         Occupational Therapy Notes (last 24 hours)  Notes from 05/26/23 1951 through 05/27/23 1951   No notes exist for this encounter.         Respiratory Therapy Notes (last 24 hours)  Notes from 05/26/23 1951 through 05/27/23 1951   No notes exist for this encounter.

## 2023-05-27 NOTE — PROGRESS NOTES
Name: Orion Casiano ADMIT: 2023   : 1950  PCP: Niecy Galvan MD    MRN: [id number] LOS: 1 days   AGE/SEX: 72 y.o. male  ROOM: [facility]     Subjective   Subjective     Patient is seen at bedside, no new complaints.         Objective   Objective   Vital Signs  Temp:  [97.8 °F (36.6 °C)-98.9 °F (37.2 °C)] 98 °F (36.7 °C)  Heart Rate:  [] 111  Resp:  [18-22] 20  BP: (144-155)/(84-93) 144/92  SpO2:  [90 %-95 %] 92 %  on  Flow (L/min):  [3-5] 5;   Device (Oxygen Therapy): nasal cannula  Body mass index is 30.05 kg/m².  Physical Exam   General, awake and alert.  Head and ENT, normocephalic and atraumatic.  Lungs, symmetric expansion, equal air entry bilaterally.  Heart, regular rate and rhythm.  Abdomen, soft and nontender.  Extremities, no clubbing or cyanosis.  Neuro, no focal deficits.  Skin: Warm and no rash.  Psych, normal mood and affect.  Musculoskeletal, joint examination is grossly normal.        Results Review     I reviewed the patient's new clinical results.  Results from last 7 days   Lab Units 23  0623  1429   WBC 10*3/mm3 19.18* 22.52*   HEMOGLOBIN g/dL 13.4 13.7   PLATELETS 10*3/mm3 270 266     Results from last 7 days   Lab Units 23  0623  1429   SODIUM mmol/L 133* 131*   POTASSIUM mmol/L 4.6 4.1   CHLORIDE mmol/L 99 95*   CO2 mmol/L 21.8* 24.0   BUN mg/dL 25* 18   CREATININE mg/dL 1.39* 1.83*   GLUCOSE mg/dL 113* 95   EGFR mL/min/1.73 53.9* 38.7*     Results from last 7 days   Lab Units 23  0623  1429   ALBUMIN g/dL 2.7* 3.5   BILIRUBIN mg/dL 4.2* 5.7*   ALK PHOS U/L 146* 153*   AST (SGOT) U/L 81* 85*   ALT (SGPT) U/L 55* 50*     Results from last 7 days   Lab Units 23  0627 23  1429   CALCIUM mg/dL 9.6 8.9   ALBUMIN g/dL 2.7* 3.5     Results from last 7 days   Lab Units 23  1727 23  1429   PROCALCITONIN ng/mL  --  9.19*   LACTATE mmol/L 2.0 2.8*     No results found for: HGBA1C, POCGLU    No radiology  results for the last day  I have personally reviewed all medications:  Scheduled Medications  allopurinol, 100 mg, Oral, Daily  apixaban, 5 mg, Oral, BID  Azelastine HCl, 2 spray, Each Nare, BID  azithromycin, 500 mg, Intravenous, Q24H  cefTRIAXone, 1 g, Intravenous, Q24H  ipratropium-albuterol, 3 mL, Nebulization, 4x Daily - RT  [START ON 5/30/2023] methotrexate, 2.5 mg, Oral, Weekly  montelukast, 10 mg, Oral, Nightly  predniSONE, 40 mg, Oral, Daily With Breakfast  rosuvastatin, 10 mg, Oral, Daily  senna-docusate sodium, 2 tablet, Oral, BID  sodium chloride, 10 mL, Intravenous, Q12H  traZODone, 100 mg, Oral, Daily    Infusions   Diet  Diet: Renal Diets; Low Sodium (2-3g), Low Potassium, Low Phosphorus; Texture: Regular Texture (IDDSI 7); Fluid Consistency: Thin (IDDSI 0)    I have personally reviewed:  [x]  Laboratory   [x]  Microbiology   [x]  Radiology   [x]  EKG/Telemetry  [x]  Cardiology/Vascular   []  Pathology    []  Records       Assessment/Plan     Active Hospital Problems    Diagnosis  POA   • **Healthcare-associated pneumonia [J18.9]  Yes   • A-fib [I48.91]  Unknown   • MURIEL (acute kidney injury) [N17.9]  Unknown   • Acute respiratory failure with hypoxia [J96.01]  Yes   • Chronic interstitial lung disease [J84.9]  Yes   • Primary hypertension [I10]  Yes   • Asthma [J45.909]  Yes      Resolved Hospital Problems   No resolved problems to display.       72 y.o. male admitted with Healthcare-associated pneumonia.    Assessment and plan:  1.  Acute hypoxemic respiratory failure, with asthma exacerbation and pneumonia.  Patient has underlying interstitial lung disease, continue to follow pulmonary recommendations.  Continue supplemental oxygen.  Continue Rocephin and Zithromax.    2.  Asthma exacerbation, management as above.    3.  Acute on chronic kidney injury, renal function is improving.    4.  Hypertension, continue to monitor BP trend.    5.  Atrial fibrillation, patient is currently rate controlled.  He  is on anticoagulation with Eliquis.    6.  CODE STATUS is full code.  Further plans based on hospital course.    Roderick Basilio MD  [facility]  05/27/23  17:20 EDT

## 2023-05-28 LAB
ALBUMIN SERPL-MCNC: 2.9 G/DL (ref 3.5–5.2)
ALBUMIN/GLOB SERPL: 0.7 G/DL
ALP SERPL-CCNC: 134 U/L (ref 39–117)
ALT SERPL W P-5'-P-CCNC: 66 U/L (ref 1–41)
ANION GAP SERPL CALCULATED.3IONS-SCNC: 12.8 MMOL/L (ref 5–15)
AST SERPL-CCNC: 130 U/L (ref 1–40)
BACTERIA SPEC RESP CULT: NORMAL
BILIRUB SERPL-MCNC: 2.8 MG/DL (ref 0–1.2)
BUN SERPL-MCNC: 33 MG/DL (ref 8–23)
BUN/CREAT SERPL: 18.3 (ref 7–25)
CALCIUM SPEC-SCNC: 9.2 MG/DL (ref 8.6–10.5)
CHLORIDE SERPL-SCNC: 95 MMOL/L (ref 98–107)
CO2 SERPL-SCNC: 20.2 MMOL/L (ref 22–29)
CREAT SERPL-MCNC: 1.8 MG/DL (ref 0.76–1.27)
DEPRECATED RDW RBC AUTO: 46 FL (ref 37–54)
EGFRCR SERPLBLD CKD-EPI 2021: 39.5 ML/MIN/1.73
ERYTHROCYTE [DISTWIDTH] IN BLOOD BY AUTOMATED COUNT: 14 % (ref 12.3–15.4)
GLOBULIN UR ELPH-MCNC: 3.9 GM/DL
GLUCOSE SERPL-MCNC: 116 MG/DL (ref 65–99)
GRAM STN SPEC: NORMAL
HCT VFR BLD AUTO: 35 % (ref 37.5–51)
HGB BLD-MCNC: 12.3 G/DL (ref 13–17.7)
LYMPHOCYTES # BLD MANUAL: 2.68 10*3/MM3 (ref 0.7–3.1)
LYMPHOCYTES NFR BLD MANUAL: 6 % (ref 5–12)
MCH RBC QN AUTO: 32.1 PG (ref 26.6–33)
MCHC RBC AUTO-ENTMCNC: 35.1 G/DL (ref 31.5–35.7)
MCV RBC AUTO: 91.4 FL (ref 79–97)
MONOCYTES # BLD: 1.61 10*3/MM3 (ref 0.1–0.9)
NEUTROPHILS # BLD AUTO: 22.47 10*3/MM3 (ref 1.7–7)
NEUTROPHILS NFR BLD MANUAL: 84 % (ref 42.7–76)
PLAT MORPH BLD: NORMAL
PLATELET # BLD AUTO: 310 10*3/MM3 (ref 140–450)
PMV BLD AUTO: 10 FL (ref 6–12)
POIKILOCYTOSIS BLD QL SMEAR: ABNORMAL
POTASSIUM SERPL-SCNC: 4.7 MMOL/L (ref 3.5–5.2)
PROT SERPL-MCNC: 6.8 G/DL (ref 6–8.5)
RBC # BLD AUTO: 3.83 10*6/MM3 (ref 4.14–5.8)
SODIUM SERPL-SCNC: 128 MMOL/L (ref 136–145)
VARIANT LYMPHS NFR BLD MANUAL: 10 % (ref 19.6–45.3)
WBC MORPH BLD: NORMAL
WBC NRBC COR # BLD: 26.75 10*3/MM3 (ref 3.4–10.8)

## 2023-05-28 PROCEDURE — 94761 N-INVAS EAR/PLS OXIMETRY MLT: CPT

## 2023-05-28 PROCEDURE — 94799 UNLISTED PULMONARY SVC/PX: CPT

## 2023-05-28 PROCEDURE — 93005 ELECTROCARDIOGRAM TRACING: CPT | Performed by: INTERNAL MEDICINE

## 2023-05-28 PROCEDURE — 85007 BL SMEAR W/DIFF WBC COUNT: CPT | Performed by: INTERNAL MEDICINE

## 2023-05-28 PROCEDURE — 25010000002 AZITHROMYCIN PER 500 MG: Performed by: INTERNAL MEDICINE

## 2023-05-28 PROCEDURE — 25010000002 METHYLPREDNISOLONE PER 40 MG: Performed by: INTERNAL MEDICINE

## 2023-05-28 PROCEDURE — 25010000002 CEFTRIAXONE PER 250 MG: Performed by: INTERNAL MEDICINE

## 2023-05-28 PROCEDURE — 63710000001 PREDNISONE PER 1 MG: Performed by: INTERNAL MEDICINE

## 2023-05-28 PROCEDURE — 80053 COMPREHEN METABOLIC PANEL: CPT | Performed by: INTERNAL MEDICINE

## 2023-05-28 PROCEDURE — 85025 COMPLETE CBC W/AUTO DIFF WBC: CPT | Performed by: INTERNAL MEDICINE

## 2023-05-28 PROCEDURE — 93010 ELECTROCARDIOGRAM REPORT: CPT | Performed by: STUDENT IN AN ORGANIZED HEALTH CARE EDUCATION/TRAINING PROGRAM

## 2023-05-28 RX ORDER — METHYLPREDNISOLONE SODIUM SUCCINATE 40 MG/ML
40 INJECTION, POWDER, LYOPHILIZED, FOR SOLUTION INTRAMUSCULAR; INTRAVENOUS EVERY 8 HOURS
Status: DISCONTINUED | OUTPATIENT
Start: 2023-05-28 | End: 2023-05-31

## 2023-05-28 RX ADMIN — AZITHROMYCIN MONOHYDRATE 500 MG: 500 INJECTION, POWDER, LYOPHILIZED, FOR SOLUTION INTRAVENOUS at 06:53

## 2023-05-28 RX ADMIN — IPRATROPIUM BROMIDE AND ALBUTEROL SULFATE 3 ML: 2.5; .5 SOLUTION RESPIRATORY (INHALATION) at 19:32

## 2023-05-28 RX ADMIN — SODIUM CHLORIDE 1 G: 9 INJECTION, SOLUTION INTRAVENOUS at 06:53

## 2023-05-28 RX ADMIN — DOCUSATE SODIUM 50MG AND SENNOSIDES 8.6MG 2 TABLET: 8.6; 5 TABLET, FILM COATED ORAL at 09:08

## 2023-05-28 RX ADMIN — IPRATROPIUM BROMIDE AND ALBUTEROL SULFATE 3 ML: 2.5; .5 SOLUTION RESPIRATORY (INHALATION) at 11:01

## 2023-05-28 RX ADMIN — APIXABAN 5 MG: 5 TABLET, FILM COATED ORAL at 09:08

## 2023-05-28 RX ADMIN — PREDNISONE 40 MG: 20 TABLET ORAL at 09:08

## 2023-05-28 RX ADMIN — IPRATROPIUM BROMIDE AND ALBUTEROL SULFATE 3 ML: 2.5; .5 SOLUTION RESPIRATORY (INHALATION) at 07:34

## 2023-05-28 RX ADMIN — ROSUVASTATIN CALCIUM 10 MG: 10 TABLET, FILM COATED ORAL at 09:08

## 2023-05-28 RX ADMIN — Medication 10 ML: at 21:28

## 2023-05-28 RX ADMIN — AZELASTINE HYDROCHLORIDE 274 MCG: 137 SPRAY, METERED NASAL at 21:27

## 2023-05-28 RX ADMIN — IPRATROPIUM BROMIDE AND ALBUTEROL SULFATE 3 ML: 2.5; .5 SOLUTION RESPIRATORY (INHALATION) at 14:25

## 2023-05-28 RX ADMIN — APIXABAN 5 MG: 5 TABLET, FILM COATED ORAL at 21:27

## 2023-05-28 RX ADMIN — METHYLPREDNISOLONE SODIUM SUCCINATE 40 MG: 40 INJECTION, POWDER, LYOPHILIZED, FOR SOLUTION INTRAMUSCULAR; INTRAVENOUS at 16:31

## 2023-05-28 RX ADMIN — TRAZODONE HYDROCHLORIDE 100 MG: 100 TABLET ORAL at 09:08

## 2023-05-28 RX ADMIN — MONTELUKAST SODIUM 10 MG: 10 TABLET, FILM COATED ORAL at 21:27

## 2023-05-28 RX ADMIN — ALLOPURINOL 100 MG: 100 TABLET ORAL at 09:08

## 2023-05-28 RX ADMIN — AZELASTINE HYDROCHLORIDE 274 MCG: 137 SPRAY, METERED NASAL at 09:09

## 2023-05-28 RX ADMIN — Medication 10 ML: at 09:09

## 2023-05-28 NOTE — PROGRESS NOTES
Name: Orion Casiano ADMIT: 2023   : 1950  PCP: Niecy Galvan MD    MRN: [id number] LOS: 2 days   AGE/SEX: 72 y.o. male  ROOM: [facility]     Subjective   Subjective   Patient is seen at bedside, no new complaints.       Objective   Objective   Vital Signs  Temp:  [97.5 °F (36.4 °C)-98.5 °F (36.9 °C)] 97.5 °F (36.4 °C)  Heart Rate:  [101-118] 107  Resp:  [18-28] 18  BP: (133-159)/(75-94) 133/75  SpO2:  [90 %-100 %] 91 %  on  Flow (L/min):  [5] 5;   Device (Oxygen Therapy): nasal cannula  Body mass index is 30.05 kg/m².  Physical Exam   General, awake and alert.  Head and ENT, normocephalic and atraumatic.  Lungs, symmetric expansion, equal air entry bilaterally.  Heart, regular rate and rhythm.  Abdomen, soft and nontender.  Extremities, no clubbing or cyanosis.  Neuro, no focal deficits.  Skin: Warm and no rash.  Psych, normal mood and affect.  Musculoskeletal, joint examination is grossly normal.    Copied text material from yesterday's note has been reviewed for appropriate changes and remains accurate as of 23.          Results Review     I reviewed the patient's new clinical results.  Results from last 7 days   Lab Units 23  0559 23  0627 23  1429   WBC 10*3/mm3 26.75* 19.18* 22.52*   HEMOGLOBIN g/dL 12.3* 13.4 13.7   PLATELETS 10*3/mm3 310 270 266     Results from last 7 days   Lab Units 23  0559 23  0627 23  1429   SODIUM mmol/L 128* 133* 131*   POTASSIUM mmol/L 4.7 4.6 4.1   CHLORIDE mmol/L 95* 99 95*   CO2 mmol/L 20.2* 21.8* 24.0   BUN mg/dL 33* 25* 18   CREATININE mg/dL 1.80* 1.39* 1.83*   GLUCOSE mg/dL 116* 113* 95   EGFR mL/min/1.73 39.5* 53.9* 38.7*     Results from last 7 days   Lab Units 23  0559 23  0627 23  1429   ALBUMIN g/dL 2.9* 2.7* 3.5   BILIRUBIN mg/dL 2.8* 4.2* 5.7*   ALK PHOS U/L 134* 146* 153*   AST (SGOT) U/L 130* 81* 85*   ALT (SGPT) U/L 66* 55* 50*     Results from last 7 days   Lab Units 23  0559  05/27/23  0627 05/26/23  1429   CALCIUM mg/dL 9.2 9.6 8.9   ALBUMIN g/dL 2.9* 2.7* 3.5     Results from last 7 days   Lab Units 05/26/23  1727 05/26/23  1429   PROCALCITONIN ng/mL  --  9.19*   LACTATE mmol/L 2.0 2.8*     No results found for: HGBA1C, POCGLU    No radiology results for the last day  I have personally reviewed all medications:  Scheduled Medications  allopurinol, 100 mg, Oral, Daily  apixaban, 5 mg, Oral, BID  Azelastine HCl, 2 spray, Each Nare, BID  azithromycin, 500 mg, Intravenous, Q24H  [START ON 5/29/2023] cefTRIAXone, 2 g, Intravenous, Q24H  ipratropium-albuterol, 3 mL, Nebulization, 4x Daily - RT  [START ON 5/30/2023] methotrexate, 2.5 mg, Oral, Weekly  methylPREDNISolone sodium succinate, 40 mg, Intravenous, Q8H  montelukast, 10 mg, Oral, Nightly  rosuvastatin, 10 mg, Oral, Daily  senna-docusate sodium, 2 tablet, Oral, BID  sodium chloride, 10 mL, Intravenous, Q12H  traZODone, 100 mg, Oral, Daily    Infusions   Diet  Diet: Renal Diets; Low Sodium (2-3g), Low Potassium, Low Phosphorus; Texture: Regular Texture (IDDSI 7); Fluid Consistency: Thin (IDDSI 0)    I have personally reviewed:  [x]  Laboratory   [x]  Microbiology   [x]  Radiology   [x]  EKG/Telemetry  [x]  Cardiology/Vascular   []  Pathology    []  Records       Assessment/Plan     Active Hospital Problems    Diagnosis  POA   • **Healthcare-associated pneumonia [J18.9]  Yes   • A-fib [I48.91]  Unknown   • MURIEL (acute kidney injury) [N17.9]  Unknown   • Acute respiratory failure with hypoxia [J96.01]  Yes   • Chronic interstitial lung disease [J84.9]  Yes   • Primary hypertension [I10]  Yes   • Asthma [J45.909]  Yes      Resolved Hospital Problems   No resolved problems to display.       72 y.o. male admitted with Healthcare-associated pneumonia.         Assessment and plan:  1.  Acute hypoxemic respiratory failure, with asthma exacerbation and pneumonia.  Patient has underlying interstitial lung disease, continue to follow pulmonary  recommendations.  Continue supplemental oxygen.  Continue Rocephin and Zithromax.     2.  Asthma exacerbation, management as above.     3.  Acute on chronic kidney injury, renal function is improving.     4.  Hypertension, continue to monitor BP trend.     5.  Atrial fibrillation, patient is currently rate controlled.  He is on anticoagulation with Eliquis.     6.  CODE STATUS is full code.  Further plans based on hospital course.    Roderick Basilio MD  [facility]  05/28/23  19:15 EDT

## 2023-05-28 NOTE — NURSING NOTE
to up to  brp without o2  back to bed 02 sats  Upper %  Took 10 minutes to get sats back to low 90% when 02 placed beulah in nose  Pt wheezing  Call placed to respiratory  therapy   Instructed pt to use urinal and not brp-pt agrees with plan

## 2023-05-28 NOTE — PROGRESS NOTES
"                                              LOS: 2 days   Patient Care Team:  Niecy Galvan MD as PCP - General (Family Medicine)  Patti Navarro MD as Consulting Physician (Gastroenterology)    Chief Complaint:  F/up pneumonia, respiratory failure and ILD.    Subjective   Interval History  Increased oxygen requirement.  Currently on 9-10 L.  Continues to have dyspnea but have not changed since yesterday.  Mild cough, no sputum.    REVIEW OF SYSTEMS:     GASTROINTESTINAL: No nausea or vomiting.  Mild diarrhea.  CONSTITUTIONAL: No fever or chills.     Ventilator/Non-Invasive Ventilation Settings (From admission, onward)    None                Physical Exam:     Vital Signs  Temp:  [97.5 °F (36.4 °C)-98.5 °F (36.9 °C)] 97.5 °F (36.4 °C)  Heart Rate:  [101-118] 107  Resp:  [18-32] 18  BP: (133-159)/(75-96) 133/75    Intake/Output Summary (Last 24 hours) at 5/28/2023 1552  Last data filed at 5/28/2023 1319  Gross per 24 hour   Intake 1440 ml   Output 100 ml   Net 1340 ml     Flowsheet Rows    Flowsheet Row First Filed Value   Admission Height 186.7 cm (73.5\") Documented at 05/26/2023 1339   Admission Weight 103 kg (228 lb) Documented at 05/26/2023 1339          PPE used per hospital policy    General Appearance:   Alert, cooperative, in no acute distress   ENMT:  Mallampati score 3, moist mucous membrane   Eyes:  Pupils equal and reactive to light. EOMI   Neck:   Large. Trachea midline. No thyromegaly.   Lungs:    Bilateral expiratory wheezing more prominent on the right.  Crackles on the right side.  No labored breathing.    Heart:   Regular rhythm and normal rate, normal S1 and S2, no         murmur   Skin:   No rash or ecchymosis   Abdomen:    Obese. Soft. No tenderness. No HSM.   Neuro/psych:  Conscious, alert, oriented x3. Strength 5/5 in upper and lower  ext.  Appropriate mood and affect   Extremities:  No cyanosis, clubbing or edema.  Warm extremities and well-perfused          Results Review:  "       Results from last 7 days   Lab Units 05/28/23  0559 05/27/23  0627 05/26/23  1429   SODIUM mmol/L 128* 133* 131*   POTASSIUM mmol/L 4.7 4.6 4.1   CHLORIDE mmol/L 95* 99 95*   CO2 mmol/L 20.2* 21.8* 24.0   BUN mg/dL 33* 25* 18   CREATININE mg/dL 1.80* 1.39* 1.83*   GLUCOSE mg/dL 116* 113* 95   CALCIUM mg/dL 9.2 9.6 8.9         Results from last 7 days   Lab Units 05/28/23  0559 05/27/23  0627 05/26/23  1429   WBC 10*3/mm3 26.75* 19.18* 22.52*   HEMOGLOBIN g/dL 12.3* 13.4 13.7   HEMATOCRIT % 35.0* 39.2 40.2   PLATELETS 10*3/mm3 310 270 266                           Results from last 7 days   Lab Units 05/26/23  1526   PH, ARTERIAL pH units 7.468*   PCO2, ARTERIAL mm Hg 33.7*   PO2 ART mm Hg 50.9*   FLOW RATE lpm 1   MODALITY  Cannula   O2 SATURATION CALC % 88.2*         I reviewed the patient's new clinical results.        Medication Review:   allopurinol, 100 mg, Oral, Daily  apixaban, 5 mg, Oral, BID  Azelastine HCl, 2 spray, Each Nare, BID  azithromycin, 500 mg, Intravenous, Q24H  [START ON 5/29/2023] cefTRIAXone, 2 g, Intravenous, Q24H  ipratropium-albuterol, 3 mL, Nebulization, 4x Daily - RT  [START ON 5/30/2023] methotrexate, 2.5 mg, Oral, Weekly  montelukast, 10 mg, Oral, Nightly  predniSONE, 40 mg, Oral, Daily With Breakfast  rosuvastatin, 10 mg, Oral, Daily  senna-docusate sodium, 2 tablet, Oral, BID  sodium chloride, 10 mL, Intravenous, Q12H  traZODone, 100 mg, Oral, Daily            Assessment     1. Right pneumonia, Legionella  2. ILD, unspecified seems that he has mild fibrotic changes as well consistent with honeycombing but no typical UIP.  Increase in the GG infiltrates in the right lower lobe on latest CT chest 2/22/2023 compared to 7/8/2022.  S/p bronch with BAL and TBB 2/27, unrevealing with the exception of histiocytes and eosinophils on the BAL.  3. 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  Stability confirmed at least when compared to  exam done 7/8/2022..  Suspect it is reactive secondary to ILD.  S/p TB NA 2/27 -> histiocytes  4. Pulmonary hypertension, moderate. mPAP 34, PCWP 19- Transpulmonary P: 15.  Consistent with group 2 and group 3.  5. Asthma exacerbation  6. 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 Immunosuppression: On Imuran 150 mg and prednisone 10 mg daily     8. Atrial fibrillation  9. Shortness of breath, secondary to the above      Plan     · Antibiotics with Rocephin and azithromycin  · Oxygen by NC and titrate keep SPO2 >90%   · Change prednisone to Solu-Medrol 40 mg 3 times daily due to severe pneumonia and wheezing.  Later can go back to his usual dose of prednisone 40 mg daily..  Imuran on hold now due to pneumonia.  Can be resumed later on when patient recovers from the pneumonia.  · DuoNeb 4 times a day  · VTE prophylaxis: Eliquis.        Rubens Sheets MD  05/28/23  15:52 EDT          This note was dictated utilizing Dragon dictation

## 2023-05-28 NOTE — PLAN OF CARE
"Goal Outcome Evaluation:  Pt on 5l with mask this afternoon  Strong productive cough  Has many episodes of SOB  Pt went into atrial fib-confirmed by EKG  's Praful and Kortney both informed-pt on eliquis  Had to be reminded to not  take off his o2 when her  gets up because 02 Sats dropped to 80\"s  Short of breath with any and all activity  Started on IV steroids-receiving IV antibiotics  Update given to son with father's permission  Safety maintained this shift                        "

## 2023-05-29 ENCOUNTER — APPOINTMENT (OUTPATIENT)
Dept: GENERAL RADIOLOGY | Facility: HOSPITAL | Age: 73
End: 2023-05-29
Payer: MEDICARE

## 2023-05-29 LAB
ALBUMIN SERPL-MCNC: 2.7 G/DL (ref 3.5–5.2)
ALBUMIN/GLOB SERPL: 0.7 G/DL
ALP SERPL-CCNC: 143 U/L (ref 39–117)
ALT SERPL W P-5'-P-CCNC: 88 U/L (ref 1–41)
ANION GAP SERPL CALCULATED.3IONS-SCNC: 11.8 MMOL/L (ref 5–15)
ANISOCYTOSIS BLD QL: ABNORMAL
AST SERPL-CCNC: 180 U/L (ref 1–40)
BILIRUB SERPL-MCNC: 2.4 MG/DL (ref 0–1.2)
BUN SERPL-MCNC: 37 MG/DL (ref 8–23)
BUN/CREAT SERPL: 25.5 (ref 7–25)
CALCIUM SPEC-SCNC: 9.3 MG/DL (ref 8.6–10.5)
CHLORIDE SERPL-SCNC: 94 MMOL/L (ref 98–107)
CO2 SERPL-SCNC: 22.2 MMOL/L (ref 22–29)
CORTIS SERPL-MCNC: 9.32 MCG/DL
CREAT SERPL-MCNC: 1.45 MG/DL (ref 0.76–1.27)
DEPRECATED RDW RBC AUTO: 44.1 FL (ref 37–54)
EGFRCR SERPLBLD CKD-EPI 2021: 51.2 ML/MIN/1.73
ERYTHROCYTE [DISTWIDTH] IN BLOOD BY AUTOMATED COUNT: 13.7 % (ref 12.3–15.4)
GLOBULIN UR ELPH-MCNC: 4 GM/DL
GLUCOSE SERPL-MCNC: 136 MG/DL (ref 65–99)
HCT VFR BLD AUTO: 33 % (ref 37.5–51)
HGB BLD-MCNC: 11.7 G/DL (ref 13–17.7)
LYMPHOCYTES # BLD MANUAL: 1.27 10*3/MM3 (ref 0.7–3.1)
LYMPHOCYTES NFR BLD MANUAL: 4 % (ref 5–12)
MCH RBC QN AUTO: 31.5 PG (ref 26.6–33)
MCHC RBC AUTO-ENTMCNC: 35.5 G/DL (ref 31.5–35.7)
MCV RBC AUTO: 88.7 FL (ref 79–97)
MONOCYTES # BLD: 0.84 10*3/MM3 (ref 0.1–0.9)
NEUTROPHILS # BLD AUTO: 18.98 10*3/MM3 (ref 1.7–7)
NEUTROPHILS NFR BLD MANUAL: 90 % (ref 42.7–76)
PLAT MORPH BLD: NORMAL
PLATELET # BLD AUTO: 322 10*3/MM3 (ref 140–450)
PMV BLD AUTO: 10.3 FL (ref 6–12)
POTASSIUM SERPL-SCNC: 4.5 MMOL/L (ref 3.5–5.2)
PROT SERPL-MCNC: 6.7 G/DL (ref 6–8.5)
QT INTERVAL: 325 MS
RBC # BLD AUTO: 3.72 10*6/MM3 (ref 4.14–5.8)
SODIUM SERPL-SCNC: 128 MMOL/L (ref 136–145)
URATE SERPL-MCNC: 7.6 MG/DL (ref 3.4–7)
VARIANT LYMPHS NFR BLD MANUAL: 6 % (ref 19.6–45.3)
WBC MORPH BLD: NORMAL
WBC NRBC COR # BLD: 21.09 10*3/MM3 (ref 3.4–10.8)

## 2023-05-29 PROCEDURE — 94664 DEMO&/EVAL PT USE INHALER: CPT

## 2023-05-29 PROCEDURE — 85025 COMPLETE CBC W/AUTO DIFF WBC: CPT | Performed by: INTERNAL MEDICINE

## 2023-05-29 PROCEDURE — 94799 UNLISTED PULMONARY SVC/PX: CPT

## 2023-05-29 PROCEDURE — 25010000002 CEFTRIAXONE PER 250 MG: Performed by: INTERNAL MEDICINE

## 2023-05-29 PROCEDURE — 80053 COMPREHEN METABOLIC PANEL: CPT | Performed by: INTERNAL MEDICINE

## 2023-05-29 PROCEDURE — 83935 ASSAY OF URINE OSMOLALITY: CPT | Performed by: INTERNAL MEDICINE

## 2023-05-29 PROCEDURE — 84550 ASSAY OF BLOOD/URIC ACID: CPT | Performed by: INTERNAL MEDICINE

## 2023-05-29 PROCEDURE — 94761 N-INVAS EAR/PLS OXIMETRY MLT: CPT

## 2023-05-29 PROCEDURE — 25010000002 AZITHROMYCIN PER 500 MG: Performed by: INTERNAL MEDICINE

## 2023-05-29 PROCEDURE — 71045 X-RAY EXAM CHEST 1 VIEW: CPT

## 2023-05-29 PROCEDURE — 25010000002 METHYLPREDNISOLONE PER 40 MG: Performed by: INTERNAL MEDICINE

## 2023-05-29 PROCEDURE — 85007 BL SMEAR W/DIFF WBC COUNT: CPT | Performed by: INTERNAL MEDICINE

## 2023-05-29 PROCEDURE — 82533 TOTAL CORTISOL: CPT | Performed by: INTERNAL MEDICINE

## 2023-05-29 PROCEDURE — 84300 ASSAY OF URINE SODIUM: CPT | Performed by: INTERNAL MEDICINE

## 2023-05-29 RX ORDER — TRAZODONE HYDROCHLORIDE 100 MG/1
TABLET ORAL
Qty: 30 TABLET | Refills: 2 | Status: SHIPPED | OUTPATIENT
Start: 2023-05-29

## 2023-05-29 RX ORDER — SODIUM CHLORIDE 1 G/1
1 TABLET ORAL
Status: DISCONTINUED | OUTPATIENT
Start: 2023-05-29 | End: 2023-05-30

## 2023-05-29 RX ORDER — LEVOFLOXACIN 750 MG/1
750 TABLET ORAL EVERY 24 HOURS
Status: COMPLETED | OUTPATIENT
Start: 2023-05-30 | End: 2023-06-05

## 2023-05-29 RX ADMIN — SODIUM CHLORIDE TAB 1 GM 1 G: 1 TAB at 23:29

## 2023-05-29 RX ADMIN — APIXABAN 5 MG: 5 TABLET, FILM COATED ORAL at 09:08

## 2023-05-29 RX ADMIN — Medication 10 ML: at 09:11

## 2023-05-29 RX ADMIN — APIXABAN 5 MG: 5 TABLET, FILM COATED ORAL at 19:48

## 2023-05-29 RX ADMIN — ALLOPURINOL 100 MG: 100 TABLET ORAL at 09:09

## 2023-05-29 RX ADMIN — METHYLPREDNISOLONE SODIUM SUCCINATE 40 MG: 40 INJECTION, POWDER, LYOPHILIZED, FOR SOLUTION INTRAMUSCULAR; INTRAVENOUS at 09:08

## 2023-05-29 RX ADMIN — CEFTRIAXONE 2 G: 2 INJECTION, POWDER, FOR SOLUTION INTRAMUSCULAR; INTRAVENOUS at 06:00

## 2023-05-29 RX ADMIN — AZITHROMYCIN MONOHYDRATE 500 MG: 500 INJECTION, POWDER, LYOPHILIZED, FOR SOLUTION INTRAVENOUS at 07:22

## 2023-05-29 RX ADMIN — IPRATROPIUM BROMIDE AND ALBUTEROL SULFATE 3 ML: 2.5; .5 SOLUTION RESPIRATORY (INHALATION) at 19:25

## 2023-05-29 RX ADMIN — Medication 10 ML: at 19:48

## 2023-05-29 RX ADMIN — IPRATROPIUM BROMIDE AND ALBUTEROL SULFATE 3 ML: 2.5; .5 SOLUTION RESPIRATORY (INHALATION) at 10:00

## 2023-05-29 RX ADMIN — MONTELUKAST SODIUM 10 MG: 10 TABLET, FILM COATED ORAL at 19:48

## 2023-05-29 RX ADMIN — AZELASTINE HYDROCHLORIDE 274 MCG: 137 SPRAY, METERED NASAL at 09:11

## 2023-05-29 RX ADMIN — METHYLPREDNISOLONE SODIUM SUCCINATE 40 MG: 40 INJECTION, POWDER, LYOPHILIZED, FOR SOLUTION INTRAMUSCULAR; INTRAVENOUS at 23:29

## 2023-05-29 RX ADMIN — METHYLPREDNISOLONE SODIUM SUCCINATE 40 MG: 40 INJECTION, POWDER, LYOPHILIZED, FOR SOLUTION INTRAMUSCULAR; INTRAVENOUS at 00:59

## 2023-05-29 RX ADMIN — IPRATROPIUM BROMIDE AND ALBUTEROL SULFATE 3 ML: 2.5; .5 SOLUTION RESPIRATORY (INHALATION) at 06:55

## 2023-05-29 RX ADMIN — AZELASTINE HYDROCHLORIDE 274 MCG: 137 SPRAY, METERED NASAL at 19:48

## 2023-05-29 RX ADMIN — ROSUVASTATIN CALCIUM 10 MG: 10 TABLET, FILM COATED ORAL at 09:11

## 2023-05-29 RX ADMIN — IPRATROPIUM BROMIDE AND ALBUTEROL SULFATE 3 ML: 2.5; .5 SOLUTION RESPIRATORY (INHALATION) at 14:22

## 2023-05-29 RX ADMIN — TRAZODONE HYDROCHLORIDE 100 MG: 100 TABLET ORAL at 09:10

## 2023-05-29 RX ADMIN — METHYLPREDNISOLONE SODIUM SUCCINATE 40 MG: 40 INJECTION, POWDER, LYOPHILIZED, FOR SOLUTION INTRAMUSCULAR; INTRAVENOUS at 17:13

## 2023-05-29 NOTE — PLAN OF CARE
Problem: Adult Inpatient Plan of Care  Goal: Plan of Care Review  Outcome: Ongoing, Progressing  Flowsheets (Taken 5/29/2023 1721)  Plan of Care Reviewed With: patient  Outcome Evaluation: Patient had a run of a flutter this morning at 7am so Cardiology consult ordered. He ran afib most of today. He reports feeling better and iv steriods were started. VS stable, no pain issues and oxygen needed because if he removes it his oxygen saturation drops to low 80's.  Goal: Absence of Hospital-Acquired Illness or Injury  Outcome: Ongoing, Progressing  Intervention: Identify and Manage Fall Risk  Recent Flowsheet Documentation  Taken 5/29/2023 1705 by Corin Romero RN  Safety Promotion/Fall Prevention:   toileting scheduled   safety round/check completed   room organization consistent   nonskid shoes/slippers when out of bed   lighting adjusted   fall prevention program maintained  Taken 5/29/2023 1600 by Corin Romero RN  Safety Promotion/Fall Prevention:   toileting scheduled   safety round/check completed   room organization consistent   nonskid shoes/slippers when out of bed   lighting adjusted   fall prevention program maintained  Taken 5/29/2023 1422 by Corin Romero RN  Safety Promotion/Fall Prevention: safety round/check completed  Taken 5/29/2023 1208 by Corin Romero RN  Safety Promotion/Fall Prevention:   toileting scheduled   safety round/check completed   room organization consistent   nonskid shoes/slippers when out of bed   fall prevention program maintained   clutter free environment maintained   lighting adjusted  Taken 5/29/2023 0858 by Corin Romero RN  Safety Promotion/Fall Prevention:   toileting scheduled   safety round/check completed   room organization consistent   nonskid shoes/slippers when out of bed   lighting adjusted   fall prevention program maintained  Intervention: Prevent Skin Injury  Recent Flowsheet Documentation  Taken 5/29/2023 1705 by Corin Romero RN  Body Position: position changed  independently  Taken 5/29/2023 1600 by Corin Romero RN  Body Position: position changed independently  Taken 5/29/2023 1422 by Corin Romero RN  Body Position: position changed independently  Skin Protection:   adhesive use limited   skin-to-skin areas padded  Taken 5/29/2023 1208 by Corin Romero RN  Body Position: position changed independently  Taken 5/29/2023 0858 by Corin Romero RN  Body Position: position changed independently  Skin Protection:   adhesive use limited   skin-to-skin areas padded  Intervention: Prevent and Manage VTE (Venous Thromboembolism) Risk  Recent Flowsheet Documentation  Taken 5/29/2023 1705 by Corin Romero RN  Activity Management: activity encouraged  Taken 5/29/2023 1600 by Corin Romero RN  Activity Management: activity encouraged  Taken 5/29/2023 1422 by Corin Romero RN  Activity Management: activity encouraged  Taken 5/29/2023 1208 by Corin Romero RN  Activity Management: activity encouraged  Taken 5/29/2023 0858 by Corin Romero RN  Activity Management: activity encouraged  Range of Motion:   ROM (range of motion) performed   active ROM (range of motion) encouraged  Intervention: Prevent Infection  Recent Flowsheet Documentation  Taken 5/29/2023 1705 by Corin Romero RN  Infection Prevention:   visitors restricted/screened   personal protective equipment utilized   environmental surveillance performed  Taken 5/29/2023 1600 by Corin Romero RN  Infection Prevention:   visitors restricted/screened   single patient room provided   environmental surveillance performed   hand hygiene promoted  Taken 5/29/2023 1422 by Corin Romero RN  Infection Prevention:   environmental surveillance performed   hand hygiene promoted   single patient room provided  Taken 5/29/2023 1208 by Corin Romero RN  Infection Prevention:   single patient room provided   hand hygiene promoted   environmental surveillance performed  Taken 5/29/2023 0858 by Corin Romero RN  Infection Prevention:   environmental  surveillance performed   hand hygiene promoted   single patient room provided  Goal: Optimal Comfort and Wellbeing  Outcome: Ongoing, Progressing  Intervention: Provide Person-Centered Care  Recent Flowsheet Documentation  Taken 5/29/2023 1422 by Corin Romero RN  Trust Relationship/Rapport:   care explained   choices provided   reassurance provided   thoughts/feelings acknowledged  Taken 5/29/2023 0858 by Corin Romero RN  Trust Relationship/Rapport:   care explained   emotional support provided   choices provided   empathic listening provided   questions answered   questions encouraged   reassurance provided   thoughts/feelings acknowledged   Goal Outcome Evaluation:  Plan of Care Reviewed With: patient           Outcome Evaluation: Patient had a run of a flutter this morning at 7am so Cardiology consult ordered. He ran afib most of today. He reports feeling better and iv steriods were started. VS stable, no pain issues and oxygen needed because if he removes it his oxygen saturation drops to low 80's.

## 2023-05-29 NOTE — PROGRESS NOTES
"                                              LOS: 3 days   Patient Care Team:  Niecy Galvan MD as PCP - General (Family Medicine)  Patti Navarro MD as Consulting Physician (Gastroenterology)    Chief Complaint:  F/up pneumonia, respiratory failure and ILD.    Subjective   Interval History  Improved.  Down to 6 L oxygen today.  He reported that his and dyspnea cough is better.  Cough is productive of greenish to bloody phlegm.      REVIEW OF SYSTEMS:     GASTROINTESTINAL: No nausea or vomiting.  Mild diarrhea.  CONSTITUTIONAL: No fever or chills.     Ventilator/Non-Invasive Ventilation Settings (From admission, onward)    None                Physical Exam:     Vital Signs  Temp:  [97.8 °F (36.6 °C)-98.2 °F (36.8 °C)] 98 °F (36.7 °C)  Heart Rate:  [] 94  Resp:  [18-20] 18  BP: (127-139)/(74-91) 133/91    Intake/Output Summary (Last 24 hours) at 5/29/2023 1625  Last data filed at 5/29/2023 0900  Gross per 24 hour   Intake 720 ml   Output 975 ml   Net -255 ml     Flowsheet Rows    Flowsheet Row First Filed Value   Admission Height 186.7 cm (73.5\") Documented at 05/26/2023 1339   Admission Weight 103 kg (228 lb) Documented at 05/26/2023 1339          PPE used per hospital policy    General Appearance:   Alert, cooperative, in no acute distress   ENMT:  Mallampati score 3, moist mucous membrane   Eyes:  Pupils equal and reactive to light. EOMI   Neck:   Large. Trachea midline. No thyromegaly.   Lungs:    Diffuse crackles on the right.  No wheezing.  No use of accessory muscles.    Heart:   Regular rhythm and normal rate, normal S1 and S2, no         murmur   Skin:   No rash or ecchymosis   Abdomen:    Obese. Soft. No tenderness. No HSM.   Neuro/psych:  Conscious, alert, oriented x3. Strength 5/5 in upper and lower  ext.  Appropriate mood and affect   Extremities:  No cyanosis, clubbing or edema.  Warm extremities and well-perfused          Results Review:        Results from last 7 days   Lab Units " 05/29/23  0708 05/28/23  0559 05/27/23  0627   SODIUM mmol/L 128* 128* 133*   POTASSIUM mmol/L 4.5 4.7 4.6   CHLORIDE mmol/L 94* 95* 99   CO2 mmol/L 22.2 20.2* 21.8*   BUN mg/dL 37* 33* 25*   CREATININE mg/dL 1.45* 1.80* 1.39*   GLUCOSE mg/dL 136* 116* 113*   CALCIUM mg/dL 9.3 9.2 9.6         Results from last 7 days   Lab Units 05/29/23  0708 05/28/23  0559 05/27/23  0627   WBC 10*3/mm3 21.09* 26.75* 19.18*   HEMOGLOBIN g/dL 11.7* 12.3* 13.4   HEMATOCRIT % 33.0* 35.0* 39.2   PLATELETS 10*3/mm3 322 310 270                           Results from last 7 days   Lab Units 05/26/23  1526   PH, ARTERIAL pH units 7.468*   PCO2, ARTERIAL mm Hg 33.7*   PO2 ART mm Hg 50.9*   FLOW RATE lpm 1   MODALITY  Cannula   O2 SATURATION CALC % 88.2*         I reviewed the patient's new clinical results.        Medication Review:   allopurinol, 100 mg, Oral, Daily  apixaban, 5 mg, Oral, BID  Azelastine HCl, 2 spray, Each Nare, BID  cefTRIAXone, 2 g, Intravenous, Q24H  ipratropium-albuterol, 3 mL, Nebulization, 4x Daily - RT  [START ON 5/30/2023] methotrexate, 2.5 mg, Oral, Weekly  methylPREDNISolone sodium succinate, 40 mg, Intravenous, Q8H  montelukast, 10 mg, Oral, Nightly  rosuvastatin, 10 mg, Oral, Daily  senna-docusate sodium, 2 tablet, Oral, BID  sodium chloride, 10 mL, Intravenous, Q12H  traZODone, 100 mg, Oral, Daily            Assessment     1. Right pneumonia, Legionella  2. ILD, unspecified seems that he has mild fibrotic changes as well consistent with honeycombing but no typical UIP.  Increase in the GG infiltrates in the right lower lobe on latest CT chest 2/22/2023 compared to 7/8/2022.  S/p bronch with BAL and TBB 2/27, unrevealing with the exception of histiocytes and eosinophils on the BAL.  3. 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  Stability confirmed at least when compared to exam done 7/8/2022..  Suspect it is reactive secondary to ILD.  S/p TB NA 2/27 ->  histiocytes  4. Pulmonary hypertension, moderate. mPAP 34, PCWP 19- Transpulmonary P: 15.  Consistent with group 2 and group 3.  5. Asthma exacerbation  6. 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 Immunosuppression: On Imuran 150 mg and prednisone 10 mg daily     8. Atrial fibrillation  9. Shortness of breath, secondary to the above  10. Psoriasis    Plan     · DC Rocephin and azithromycin and start levofloxacin for Legionella pneumonia, duration of therapy 7 days due to severity of the pneumonia and underlying immunosuppression.  · Oxygen by NC and titrate keep SPO2 >90%   · Solu-Medrol: Change the dose to 20 mg 3 times daily.  As patient continues to improve then can reduce the dose down to his usual prednisone 10 mg daily  · DuoNeb 4 times a day  · VTE prophylaxis: Eliquis.        Rubens Sheets MD  05/29/23  16:25 EDT          This note was dictated utilizing Proviation dictation

## 2023-05-29 NOTE — PROGRESS NOTES
Name: Orion Casiano ADMIT: 2023   : 1950  PCP: Niecy Galvan MD    MRN: [id number] LOS: 3 days   AGE/SEX: 72 y.o. male  ROOM: [facility]     Subjective   Subjective     Patient is seen at bedside, no new complaints.       Objective   Objective   Vital Signs  Temp:  [97.5 °F (36.4 °C)-98 °F (36.7 °C)] 97.5 °F (36.4 °C)  Heart Rate:  [] 103  Resp:  [18-20] 20  BP: (127-139)/(74-91) 137/80  SpO2:  [71 %-96 %] 91 %  on  Flow (L/min):  [5-6] 5;   Device (Oxygen Therapy): nasal cannula;humidified  Body mass index is 30.05 kg/m².  Physical Exam   General, awake and alert.  Head and ENT, normocephalic and atraumatic.  Lungs, symmetric expansion, equal air entry bilaterally.  Heart, regular rate and rhythm.  Abdomen, soft and nontender.  Extremities, no clubbing or cyanosis.  Neuro, no focal deficits.  Skin: Warm and no rash.  Psych, normal mood and affect.  Musculoskeletal, joint examination is grossly normal.    Copied text material from yesterday's note has been reviewed for appropriate changes and remains accurate as of 23.          Results Review     I reviewed the patient's new clinical results.  Results from last 7 days   Lab Units 23  0708 23  0559 23  1429   WBC 10*3/mm3 21.09* 26.75* 19.18* 22.52*   HEMOGLOBIN g/dL 11.7* 12.3* 13.4 13.7   PLATELETS 10*3/mm3 322 310 270 266     Results from last 7 days   Lab Units 23  0708 23  0559 23  1429   SODIUM mmol/L 128* 128* 133* 131*   POTASSIUM mmol/L 4.5 4.7 4.6 4.1   CHLORIDE mmol/L 94* 95* 99 95*   CO2 mmol/L 22.2 20.2* 21.8* 24.0   BUN mg/dL 37* 33* 25* 18   CREATININE mg/dL 1.45* 1.80* 1.39* 1.83*   GLUCOSE mg/dL 136* 116* 113* 95   EGFR mL/min/1.73 51.2* 39.5* 53.9* 38.7*     Results from last 7 days   Lab Units 23  0708 23  0559 23  0627 23  1429   ALBUMIN g/dL 2.7* 2.9* 2.7* 3.5   BILIRUBIN mg/dL 2.4* 2.8* 4.2* 5.7*   ALK PHOS U/L 143* 134*  146* 153*   AST (SGOT) U/L 180* 130* 81* 85*   ALT (SGPT) U/L 88* 66* 55* 50*     Results from last 7 days   Lab Units 05/29/23  0708 05/28/23  0559 05/27/23  0627 05/26/23  1429   CALCIUM mg/dL 9.3 9.2 9.6 8.9   ALBUMIN g/dL 2.7* 2.9* 2.7* 3.5     Results from last 7 days   Lab Units 05/26/23  1727 05/26/23  1429   PROCALCITONIN ng/mL  --  9.19*   LACTATE mmol/L 2.0 2.8*     No results found for: HGBA1C, POCGLU    XR Chest 1 View    Result Date: 5/29/2023  Interval worsening, with increased density of right pulmonary opacification, small left basilar atelectasis or infiltrate.  This report was finalized on 5/29/2023 6:13 AM by Dr. Darwin House M.D.      I have personally reviewed all medications:  Scheduled Medications  allopurinol, 100 mg, Oral, Daily  apixaban, 5 mg, Oral, BID  Azelastine HCl, 2 spray, Each Nare, BID  ipratropium-albuterol, 3 mL, Nebulization, 4x Daily - RT  [START ON 5/30/2023] levoFLOXacin, 750 mg, Oral, Q24H  [START ON 5/30/2023] methotrexate, 2.5 mg, Oral, Weekly  methylPREDNISolone sodium succinate, 40 mg, Intravenous, Q8H  montelukast, 10 mg, Oral, Nightly  rosuvastatin, 10 mg, Oral, Daily  senna-docusate sodium, 2 tablet, Oral, BID  sodium chloride, 10 mL, Intravenous, Q12H  traZODone, 100 mg, Oral, Daily    Infusions   Diet  Diet: Renal Diets; Low Sodium (2-3g), Low Potassium, Low Phosphorus; Texture: Regular Texture (IDDSI 7); Fluid Consistency: Thin (IDDSI 0)    I have personally reviewed:  [x]  Laboratory   [x]  Microbiology   [x]  Radiology   [x]  EKG/Telemetry  [x]  Cardiology/Vascular   []  Pathology    []  Records       Assessment/Plan     Active Hospital Problems    Diagnosis  POA   • **Healthcare-associated pneumonia [J18.9]  Yes   • A-fib [I48.91]  Unknown   • MURIEL (acute kidney injury) [N17.9]  Unknown   • Acute respiratory failure with hypoxia [J96.01]  Yes   • Chronic interstitial lung disease [J84.9]  Yes   • Primary hypertension [I10]  Yes   • Asthma [J45.909]  Yes       Resolved Hospital Problems   No resolved problems to display.       72 y.o. male admitted with Healthcare-associated pneumonia.       Assessment and plan:  1.  Acute hypoxemic respiratory failure, with asthma exacerbation and pneumonia.  Patient has underlying interstitial lung disease, continue to follow pulmonary recommendations.  Continue supplemental oxygen.  Continue Rocephin and Zithromax.     2.  Asthma exacerbation, management as above.     3.  Acute on chronic kidney injury, renal function is improving.  He also has hyponatremia, will benefit from nephrology evaluation.     4.  Hypertension, continue to monitor BP trend.     5.  Atrial fibrillation, patient is currently rate controlled.  He is on anticoagulation with Eliquis.     6.  CODE STATUS is full code.  Further plans based on hospital course.       Roderick Basilio MD  [facility]  05/29/23  19:32 EDT

## 2023-05-30 LAB
ALBUMIN SERPL-MCNC: 2.9 G/DL (ref 3.5–5.2)
ALP SERPL-CCNC: 161 U/L (ref 39–117)
ALT SERPL W P-5'-P-CCNC: 90 U/L (ref 1–41)
ANION GAP SERPL CALCULATED.3IONS-SCNC: 10.4 MMOL/L (ref 5–15)
AST SERPL-CCNC: 166 U/L (ref 1–40)
BILIRUB CONJ SERPL-MCNC: 1 MG/DL (ref 0–0.3)
BILIRUB INDIRECT SERPL-MCNC: 0.4 MG/DL
BILIRUB SERPL-MCNC: 1.4 MG/DL (ref 0–1.2)
BUN SERPL-MCNC: 43 MG/DL (ref 8–23)
BUN/CREAT SERPL: 29.3 (ref 7–25)
CALCIUM SPEC-SCNC: 9.6 MG/DL (ref 8.6–10.5)
CHLORIDE SERPL-SCNC: 99 MMOL/L (ref 98–107)
CO2 SERPL-SCNC: 23.6 MMOL/L (ref 22–29)
CREAT SERPL-MCNC: 1.47 MG/DL (ref 0.76–1.27)
EGFRCR SERPLBLD CKD-EPI 2021: 50.4 ML/MIN/1.73
GLUCOSE SERPL-MCNC: 144 MG/DL (ref 65–99)
HAV IGM SERPL QL IA: NORMAL
HBV CORE IGM SERPL QL IA: NORMAL
HBV SURFACE AG SERPL QL IA: NORMAL
HCV AB SER DONR QL: NORMAL
OSMOLALITY SERPL: 292 MOSM/KG (ref 280–301)
OSMOLALITY UR: 557 MOSM/KG (ref 300–800)
POTASSIUM SERPL-SCNC: 4.9 MMOL/L (ref 3.5–5.2)
PROT SERPL-MCNC: 5.9 G/DL (ref 6–8.5)
SODIUM SERPL-SCNC: 133 MMOL/L (ref 136–145)
SODIUM UR-SCNC: <20 MMOL/L
TSH SERPL DL<=0.05 MIU/L-ACNC: 0.26 UIU/ML (ref 0.27–4.2)

## 2023-05-30 PROCEDURE — 94664 DEMO&/EVAL PT USE INHALER: CPT

## 2023-05-30 PROCEDURE — 84443 ASSAY THYROID STIM HORMONE: CPT | Performed by: INTERNAL MEDICINE

## 2023-05-30 PROCEDURE — 25010000002 METHYLPREDNISOLONE PER 40 MG: Performed by: INTERNAL MEDICINE

## 2023-05-30 PROCEDURE — 99221 1ST HOSP IP/OBS SF/LOW 40: CPT | Performed by: PHYSICIAN ASSISTANT

## 2023-05-30 PROCEDURE — 94799 UNLISTED PULMONARY SVC/PX: CPT

## 2023-05-30 PROCEDURE — 83930 ASSAY OF BLOOD OSMOLALITY: CPT | Performed by: INTERNAL MEDICINE

## 2023-05-30 PROCEDURE — 80074 ACUTE HEPATITIS PANEL: CPT | Performed by: PHYSICIAN ASSISTANT

## 2023-05-30 PROCEDURE — 99221 1ST HOSP IP/OBS SF/LOW 40: CPT | Performed by: NURSE PRACTITIONER

## 2023-05-30 PROCEDURE — 80076 HEPATIC FUNCTION PANEL: CPT | Performed by: PHYSICIAN ASSISTANT

## 2023-05-30 PROCEDURE — 80048 BASIC METABOLIC PNL TOTAL CA: CPT | Performed by: INTERNAL MEDICINE

## 2023-05-30 PROCEDURE — 63710000001 METHOTREXATE PER 2.5 MG: Performed by: INTERNAL MEDICINE

## 2023-05-30 RX ORDER — DILTIAZEM HYDROCHLORIDE 180 MG/1
180 CAPSULE, COATED, EXTENDED RELEASE ORAL
Status: DISCONTINUED | OUTPATIENT
Start: 2023-05-30 | End: 2023-05-31

## 2023-05-30 RX ADMIN — LEVOFLOXACIN 750 MG: 750 TABLET, FILM COATED ORAL at 06:39

## 2023-05-30 RX ADMIN — APIXABAN 5 MG: 5 TABLET, FILM COATED ORAL at 20:03

## 2023-05-30 RX ADMIN — ROSUVASTATIN CALCIUM 10 MG: 10 TABLET, FILM COATED ORAL at 08:07

## 2023-05-30 RX ADMIN — APIXABAN 5 MG: 5 TABLET, FILM COATED ORAL at 08:09

## 2023-05-30 RX ADMIN — DOCUSATE SODIUM 50MG AND SENNOSIDES 8.6MG 2 TABLET: 8.6; 5 TABLET, FILM COATED ORAL at 20:03

## 2023-05-30 RX ADMIN — IPRATROPIUM BROMIDE AND ALBUTEROL SULFATE 3 ML: 2.5; .5 SOLUTION RESPIRATORY (INHALATION) at 08:49

## 2023-05-30 RX ADMIN — IPRATROPIUM BROMIDE AND ALBUTEROL SULFATE 3 ML: 2.5; .5 SOLUTION RESPIRATORY (INHALATION) at 11:56

## 2023-05-30 RX ADMIN — ALLOPURINOL 100 MG: 100 TABLET ORAL at 08:07

## 2023-05-30 RX ADMIN — AZELASTINE HYDROCHLORIDE 274 MCG: 137 SPRAY, METERED NASAL at 08:09

## 2023-05-30 RX ADMIN — Medication 10 ML: at 20:04

## 2023-05-30 RX ADMIN — METHYLPREDNISOLONE SODIUM SUCCINATE 40 MG: 40 INJECTION, POWDER, LYOPHILIZED, FOR SOLUTION INTRAMUSCULAR; INTRAVENOUS at 07:48

## 2023-05-30 RX ADMIN — IPRATROPIUM BROMIDE AND ALBUTEROL SULFATE 3 ML: 2.5; .5 SOLUTION RESPIRATORY (INHALATION) at 15:50

## 2023-05-30 RX ADMIN — AZELASTINE HYDROCHLORIDE 274 MCG: 137 SPRAY, METERED NASAL at 20:03

## 2023-05-30 RX ADMIN — DILTIAZEM HYDROCHLORIDE 180 MG: 180 CAPSULE, COATED, EXTENDED RELEASE ORAL at 11:27

## 2023-05-30 RX ADMIN — METHOTREXATE 2.5 MG: 2.5 TABLET ORAL at 08:07

## 2023-05-30 RX ADMIN — Medication 10 ML: at 08:09

## 2023-05-30 RX ADMIN — IPRATROPIUM BROMIDE AND ALBUTEROL SULFATE 3 ML: 2.5; .5 SOLUTION RESPIRATORY (INHALATION) at 19:28

## 2023-05-30 RX ADMIN — MONTELUKAST SODIUM 10 MG: 10 TABLET, FILM COATED ORAL at 20:03

## 2023-05-30 RX ADMIN — METHYLPREDNISOLONE SODIUM SUCCINATE 40 MG: 40 INJECTION, POWDER, LYOPHILIZED, FOR SOLUTION INTRAMUSCULAR; INTRAVENOUS at 16:25

## 2023-05-30 RX ADMIN — TRAZODONE HYDROCHLORIDE 100 MG: 100 TABLET ORAL at 08:07

## 2023-05-30 NOTE — PLAN OF CARE
Problem: Adult Inpatient Plan of Care  Goal: Plan of Care Review  Outcome: Ongoing, Progressing  Flowsheets (Taken 5/30/2023 1538)  Plan of Care Reviewed With: patient  Outcome Evaluation: Patient is alert and oriented, remains on 5 l of oxygen, GI consulted and patient will be npo at midnight for tests tomorrow. No complaints of pain today.  Goal: Absence of Hospital-Acquired Illness or Injury  Outcome: Ongoing, Progressing  Intervention: Identify and Manage Fall Risk  Recent Flowsheet Documentation  Taken 5/30/2023 1328 by Corin Romero RN  Safety Promotion/Fall Prevention:   activity supervised   safety round/check completed   room organization consistent   toileting scheduled   lighting adjusted   fall prevention program maintained  Taken 5/30/2023 1000 by Corin Romero RN  Safety Promotion/Fall Prevention:   toileting scheduled   safety round/check completed   room organization consistent   nonskid shoes/slippers when out of bed   lighting adjusted   fall prevention program maintained  Taken 5/30/2023 0800 by Corin Romero RN  Safety Promotion/Fall Prevention:   toileting scheduled   safety round/check completed   room organization consistent   nonskid shoes/slippers when out of bed   lighting adjusted   fall prevention program maintained  Taken 5/30/2023 0757 by Corin Romero RN  Safety Promotion/Fall Prevention:   toileting scheduled   safety round/check completed   room organization consistent   nonskid shoes/slippers when out of bed   lighting adjusted   fall prevention program maintained  Intervention: Prevent Skin Injury  Recent Flowsheet Documentation  Taken 5/30/2023 1000 by Corin Romero RN  Body Position: position changed independently  Taken 5/30/2023 0800 by Corin Romero RN  Body Position: position changed independently  Taken 5/30/2023 0758 by Corin Romero RN  Body Position: position changed independently  Taken 5/30/2023 0757 by Corin Romero RN  Body Position: position changed independently  Skin  Protection: adhesive use limited  Intervention: Prevent and Manage VTE (Venous Thromboembolism) Risk  Recent Flowsheet Documentation  Taken 5/30/2023 1328 by Corin Romero RN  Activity Management: activity encouraged  Range of Motion:   active ROM (range of motion) encouraged   ROM (range of motion) performed  Taken 5/30/2023 1000 by Corin Romero RN  Activity Management: activity encouraged  Taken 5/30/2023 0800 by Corin Romero RN  Activity Management: activity encouraged  Taken 5/30/2023 0758 by Corin Romero RN  Activity Management: activity encouraged  Taken 5/30/2023 0757 by Corin Romero RN  Activity Management: activity encouraged  VTE Prevention/Management: (eliquis) --  Range of Motion:   ROM (range of motion) performed   active ROM (range of motion) encouraged  Intervention: Prevent Infection  Recent Flowsheet Documentation  Taken 5/30/2023 1328 by Corin Romero RN  Infection Prevention:   environmental surveillance performed   single patient room provided  Taken 5/30/2023 1243 by Corin Romero RN  Infection Prevention:   single patient room provided   environmental surveillance performed   hand hygiene promoted  Taken 5/30/2023 1000 by Corin Romero RN  Infection Prevention:   environmental surveillance performed   hand hygiene promoted   single patient room provided  Taken 5/30/2023 0800 by Corin Romero RN  Infection Prevention:   environmental surveillance performed   hand hygiene promoted   single patient room provided  Taken 5/30/2023 0757 by Corin Romero RN  Infection Prevention:   environmental surveillance performed   hand hygiene promoted   single patient room provided  Goal: Optimal Comfort and Wellbeing  Outcome: Ongoing, Progressing  Intervention: Provide Person-Centered Care  Recent Flowsheet Documentation  Taken 5/30/2023 1328 by Corin Romero RN  Trust Relationship/Rapport:   care explained   choices provided   reassurance provided   thoughts/feelings acknowledged  Taken 5/30/2023 0757 by Heather  Corin RN  Trust Relationship/Rapport:   care explained   choices provided   reassurance provided   thoughts/feelings acknowledged  Goal: Readiness for Transition of Care  Outcome: Ongoing, Progressing   Goal Outcome Evaluation:  Plan of Care Reviewed With: patient           Outcome Evaluation: Patient is alert and oriented, remains on 5 l of oxygen, GI consulted and patient will be npo at midnight for tests tomorrow. No complaints of pain today.

## 2023-05-30 NOTE — PROGRESS NOTES
Name: Orion Casiano ADMIT: 2023   : 1950  PCP: Niecy Galvan MD    MRN: [id number] LOS: 4 days   AGE/SEX: 72 y.o. male  ROOM: [facility]     Subjective   Subjective   Patient seen at bedside.        Objective   Objective   Vital Signs  Temp:  [97.5 °F (36.4 °C)-98.3 °F (36.8 °C)] 98.1 °F (36.7 °C)  Heart Rate:  [] 90  Resp:  [18-22] 20  BP: (131-161)/() 147/107  SpO2:  [91 %-95 %] 92 %  on  Flow (L/min):  [5] 5;   Device (Oxygen Therapy): humidified;nasal cannula  Body mass index is 30.05 kg/m².  Physical Exam   General, awake and alert.  Head and ENT, normocephalic and atraumatic.  Lungs, symmetric expansion, equal air entry bilaterally.  Heart, regular rate and rhythm.  Abdomen, soft and nontender.  Extremities, no clubbing or cyanosis.  Neuro, no focal deficits.  Skin: Warm and no rash.  Psych, normal mood and affect.  Musculoskeletal, joint examination is grossly normal.    Copied text material from yesterday's note has been reviewed for appropriate changes and remains accurate as of 23.      Results Review     I reviewed the patient's new clinical results.  Results from last 7 days   Lab Units 23  0708 23  0559 23  0627 23  1429   WBC 10*3/mm3 21.09* 26.75* 19.18* 22.52*   HEMOGLOBIN g/dL 11.7* 12.3* 13.4 13.7   PLATELETS 10*3/mm3 322 310 270 266     Results from last 7 days   Lab Units 23  0429 23  0708 23  0559 23  0627   SODIUM mmol/L 133* 128* 128* 133*   POTASSIUM mmol/L 4.9 4.5 4.7 4.6   CHLORIDE mmol/L 99 94* 95* 99   CO2 mmol/L 23.6 22.2 20.2* 21.8*   BUN mg/dL 43* 37* 33* 25*   CREATININE mg/dL 1.47* 1.45* 1.80* 1.39*   GLUCOSE mg/dL 144* 136* 116* 113*   EGFR mL/min/1.73 50.4* 51.2* 39.5* 53.9*     Results from last 7 days   Lab Units 23  0429 23  0708 23  0559 23  0627   ALBUMIN g/dL 2.9* 2.7* 2.9* 2.7*   BILIRUBIN mg/dL 1.4* 2.4* 2.8* 4.2*   ALK PHOS U/L 161* 143* 134* 146*   AST (SGOT)  U/L 166* 180* 130* 81*   ALT (SGPT) U/L 90* 88* 66* 55*     Results from last 7 days   Lab Units 05/30/23  0429 05/29/23  0708 05/28/23  0559 05/27/23  0627   CALCIUM mg/dL 9.6 9.3 9.2 9.6   ALBUMIN g/dL 2.9* 2.7* 2.9* 2.7*     Results from last 7 days   Lab Units 05/26/23  1727 05/26/23  1429   PROCALCITONIN ng/mL  --  9.19*   LACTATE mmol/L 2.0 2.8*     No results found for: HGBA1C, POCGLU    XR Chest 1 View    Result Date: 5/29/2023  Interval worsening, with increased density of right pulmonary opacification, small left basilar atelectasis or infiltrate.  This report was finalized on 5/29/2023 6:13 AM by Dr. Darwin House M.D.      I have personally reviewed all medications:  Scheduled Medications  allopurinol, 100 mg, Oral, Daily  apixaban, 5 mg, Oral, BID  Azelastine HCl, 2 spray, Each Nare, BID  dilTIAZem CD, 180 mg, Oral, Q24H  ipratropium-albuterol, 3 mL, Nebulization, 4x Daily - RT  levoFLOXacin, 750 mg, Oral, Q24H  methotrexate, 2.5 mg, Oral, Weekly  methylPREDNISolone sodium succinate, 40 mg, Intravenous, Q8H  montelukast, 10 mg, Oral, Nightly  rosuvastatin, 10 mg, Oral, Daily  senna-docusate sodium, 2 tablet, Oral, BID  sodium chloride, 10 mL, Intravenous, Q12H  traZODone, 100 mg, Oral, Daily    Infusions   Diet  Diet: Renal Diets; Low Sodium (2-3g), Low Potassium, Low Phosphorus; Texture: Regular Texture (IDDSI 7); Fluid Consistency: Thin (IDDSI 0)  NPO Diet NPO Type: Sips with Meds    I have personally reviewed:  [x]  Laboratory   [x]  Microbiology   [x]  Radiology   [x]  EKG/Telemetry  [x]  Cardiology/Vascular   []  Pathology    []  Records       Assessment/Plan     Active Hospital Problems    Diagnosis  POA   • **Healthcare-associated pneumonia [J18.9]  Yes   • A-fib [I48.91]  Unknown   • MURIEL (acute kidney injury) [N17.9]  Unknown   • Acute respiratory failure with hypoxia [J96.01]  Yes   • Chronic interstitial lung disease [J84.9]  Yes   • Primary hypertension [I10]  Yes   • Asthma [J45.909]   Yes      Resolved Hospital Problems   No resolved problems to display.       72 y.o. male admitted with Healthcare-associated pneumonia.      Assessment and plan:  1.  Acute hypoxemic respiratory failure, with asthma exacerbation and pneumonia.  Patient has underlying interstitial lung disease, continue to follow pulmonary recommendations.  Continue supplemental oxygen.  Continue Levaquin.      2.  Asthma exacerbation, management as above.     3.  Acute on chronic kidney injury, renal function is improving.  He also has hyponatremia, will benefit from nephrology evaluation.     4.  Hypertension, continue to monitor BP trend.     5.  Atrial fibrillation, patient is currently rate controlled.  He is on anticoagulation with Eliquis.     6.  CODE STATUS is full code.  Further plans based on hospital course.      Roderick Basilio MD  [facility]  05/30/23  16:27 EDT

## 2023-05-30 NOTE — PROGRESS NOTES
"                                              LOS: 4 days   Patient Care Team:  Niecy Galvan MD as PCP - General (Family Medicine)  Patti Navarro MD as Consulting Physician (Gastroenterology)    Chief Complaint:  F/up pneumonia, respiratory failure and ILD.    Subjective   Interval History  Down to 2 L oxygen nasal cannula.  Feels much better this morning.    REVIEW OF SYSTEMS:     GASTROINTESTINAL: No nausea or vomiting.  Mild diarrhea.  CONSTITUTIONAL: No fever or chills.     Ventilator/Non-Invasive Ventilation Settings (From admission, onward)    None                Physical Exam:     Vital Signs  Temp:  [97.5 °F (36.4 °C)-98.3 °F (36.8 °C)] 98.1 °F (36.7 °C)  Heart Rate:  [] 99  Resp:  [18-22] 20  BP: (131-161)/() 147/107    Intake/Output Summary (Last 24 hours) at 5/30/2023 1309  Last data filed at 5/30/2023 0757  Gross per 24 hour   Intake 1560 ml   Output 920 ml   Net 640 ml     Flowsheet Rows    Flowsheet Row First Filed Value   Admission Height 186.7 cm (73.5\") Documented at 05/26/2023 1339   Admission Weight 103 kg (228 lb) Documented at 05/26/2023 1339          PPE used per hospital policy    General Appearance:   Alert, cooperative, in no acute distress   ENMT:  Mallampati score 3, moist mucous membrane   Eyes:  Pupils equal and reactive to light. EOMI   Neck:   Large. Trachea midline. No thyromegaly.   Lungs:    Diffuse crackles on the right.  No wheezing.  No use of accessory muscles.    Heart:   Regular rhythm and normal rate, normal S1 and S2, no         murmur   Skin:   No rash or ecchymosis   Abdomen:    Obese. Soft. No tenderness. No HSM.   Neuro/psych:  Conscious, alert, oriented x3. Strength 5/5 in upper and lower  ext.  Appropriate mood and affect   Extremities:  No cyanosis, clubbing or edema.  Warm extremities and well-perfused          Results Review:        Results from last 7 days   Lab Units 05/30/23  0429 05/29/23  0708 05/28/23  0559   SODIUM mmol/L 133* 128* " 128*   POTASSIUM mmol/L 4.9 4.5 4.7   CHLORIDE mmol/L 99 94* 95*   CO2 mmol/L 23.6 22.2 20.2*   BUN mg/dL 43* 37* 33*   CREATININE mg/dL 1.47* 1.45* 1.80*   GLUCOSE mg/dL 144* 136* 116*   CALCIUM mg/dL 9.6 9.3 9.2         Results from last 7 days   Lab Units 05/29/23  0708 05/28/23  0559 05/27/23  0627   WBC 10*3/mm3 21.09* 26.75* 19.18*   HEMOGLOBIN g/dL 11.7* 12.3* 13.4   HEMATOCRIT % 33.0* 35.0* 39.2   PLATELETS 10*3/mm3 322 310 270                           Results from last 7 days   Lab Units 05/26/23  1526   PH, ARTERIAL pH units 7.468*   PCO2, ARTERIAL mm Hg 33.7*   PO2 ART mm Hg 50.9*   FLOW RATE lpm 1   MODALITY  Cannula   O2 SATURATION CALC % 88.2*         I reviewed the patient's new clinical results.        Medication Review:   allopurinol, 100 mg, Oral, Daily  apixaban, 5 mg, Oral, BID  Azelastine HCl, 2 spray, Each Nare, BID  dilTIAZem CD, 180 mg, Oral, Q24H  ipratropium-albuterol, 3 mL, Nebulization, 4x Daily - RT  levoFLOXacin, 750 mg, Oral, Q24H  methotrexate, 2.5 mg, Oral, Weekly  methylPREDNISolone sodium succinate, 40 mg, Intravenous, Q8H  montelukast, 10 mg, Oral, Nightly  rosuvastatin, 10 mg, Oral, Daily  senna-docusate sodium, 2 tablet, Oral, BID  sodium chloride, 10 mL, Intravenous, Q12H  traZODone, 100 mg, Oral, Daily            Assessment     1. Right pneumonia, Legionella  2. ILD, unspecified seems that he has mild fibrotic changes as well consistent with honeycombing but no typical UIP.  Increase in the GG infiltrates in the right lower lobe on latest CT chest 2/22/2023 compared to 7/8/2022.  S/p bronch with BAL and TBB 2/27, unrevealing with the exception of histiocytes and eosinophils on the BAL.  3. 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  Stability confirmed at least when compared to exam done 7/8/2022..  Suspect it is reactive secondary to ILD.  S/p TB NA 2/27 -> histiocytes  4. Pulmonary hypertension, moderate. mPAP 34, PCWP 19-  Transpulmonary P: 15.  Consistent with group 2 and group 3.  5. Asthma exacerbation  6. 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 Immunosuppression: On Imuran 150 mg and prednisone 10 mg daily     8. Atrial fibrillation  9. Shortness of breath, secondary to the above  10. Psoriasis    Plan     · Levaquin started to cover for Legionella pneumonia.  Duration of therapy 7 days due to severity of the pneumonia and underlying immunosuppression.  Chest x-ray reviewed noted dense pneumonia on the right side.  · Oxygen by NC and titrate keep SPO2 >90%   · Continue weaning steroid as tolerated  · DuoNeb 4 times a day  · VTE prophylaxis: Eliquis.  · Mobilize and ambulate        Casey Gutierrez MD  05/30/23  13:09 EDT          This note was dictated utilizing Dragon dictation

## 2023-05-30 NOTE — CONSULTS
[facility]  Initial Inpatient Consult Note    Referring Provider: Praful     Reason for Consultation: Transaminitis    Subjective     History of present illness:    72 y.o. male patient of Dr. Navarro with chronic interstitial lung disease, asthma, A-fib on Eliquis who presented to the hospital  with worsening shortness of breath and cough.  Patient has interstitial lung disease at baseline and is on immunosuppression.  He requires 2 L nasal cannula at home. Currently being treated for Legionella pneumonia with Levaquin and acute asthma exacerbation with IV steroids.    Labs notable for , ALT 88, total bilirubin 2.4, , creatinine 1.47, lactate 2.8.  LFTs within normal limits prior to hospitalization on 5/8/2023.    Last colonoscopy 3/11/2020 - diverticulosis, nonbleeding internal hemorrhoid, multiple polyps with adenomatous changes - recall 3 years.     Patient denies any abdominal pain. Reports that he is having normal bowel movements. Denies personal and family history of liver disease.  He denies history of hepatitis.  Patient reports he drinks 1 drink 1-2 times per week.  He appears to be in good spirits. He wears 2 L nasal cannula at home and is currently on 6 L but says he was able to tolerate walking today without significant shortness of breath.       Past Medical History:  Past Medical History:   Diagnosis Date   • Acute bronchitis    • Adenomatous polyp of colon 01/08/2020    Added automatically from request for surgery [id number]   • Allergic rhinitis    • Asthma    • Bacterial pneumonia    • Bilateral lower leg cellulitis    • Colon polyp    • Cutaneous abscess of left lower limb    • Deficiency of other specified B group vitamins    • Dermatitis    • Disc disorder of lumbar region     worse at L4-5 with protusion/herniation   • Dysuria    • Effusion, right hand     resolved   • Essential (primary) hypertension    • Frequency of micturition    • Gout    • Hematuria    •  Hyperglycemia    • Hyperlipidemia    • Hypertension    • ILD (interstitial lung disease)    • Insomnia    • Localized swelling, mass and lump, unspecified    • Low back pain    • Lumbago    • Microscopic hematuria    • Pain in left knee    • Sleep disturbances    • Vertigo     resolved   • Vitamin B12 deficiency      Past Surgical History:  Past Surgical History:   Procedure Laterality Date   • BRONCHOSCOPY  02/27/2023    Procedure: BRONCHOSCOPY WITH FLUORO, BAL, AND BIOPSIES AND ENDOBRONCHIAL ULTRASOUND WITH FNA;  Surgeon: Rubens Sheets MD;  Location: [facility] ENDOSCOPY;  Service: Pulmonary;;  INTERSTITIAL LUNG DISEASE   • CARDIAC CATHETERIZATION N/A 02/24/2023    Procedure: Right Heart Cath;  Surgeon: Ravi Fonseca MD;  Location:  [facility] CATH INVASIVE LOCATION;  Service: Cardiovascular;  Laterality: N/A;   • COLONOSCOPY      maybe 2016   • COLONOSCOPY N/A 03/11/2020    Procedure: COLONOSCOPY TO CECUM AND TI WITH COLD AND HOT SNARE AND POLYPECTOMIES;  Surgeon: Patti Navarro MD;  Location: [facility] ENDOSCOPY;  Service: Gastroenterology;  Laterality: N/A;  PRE: H/O COLON POLYPS  POST: POLYPS, HEMORRHOIDS, DIVERTICULOSIS   • LUMBAR DISC SURGERY      2008   • NOSE SURGERY        Social History:   Social History     Tobacco Use   • Smoking status: Never   • Smokeless tobacco: Never   Substance Use Topics   • Alcohol use: Yes     Comment: occassionally      Family History:  Family History   Problem Relation Age of Onset   • Coronary artery disease Father        Home Meds:  Medications Prior to Admission   Medication Sig Dispense Refill Last Dose   • albuterol sulfate  (90 Base) MCG/ACT inhaler Inhale 2 puffs Every 6 (Six) Hours As Needed for Wheezing. 8 g 0 5/26/2023   • allopurinol (ZYLOPRIM) 100 MG tablet TAKE ONE TABLET BY MOUTH DAILY 30 tablet 2 5/26/2023   • amLODIPine (NORVASC) 10 MG tablet Take  by mouth Daily.   5/26/2023   • azaTHIOprine (IMURAN) 50 MG tablet Take  by mouth Daily.   5/26/2023    • Azelastine HCl 137 MCG/SPRAY solution PLACE 2 SPRAYS IN EACH NOSTRIL TWO TIMES A DAY 30 mL 1 5/26/2023   • clobetasol (TEMOVATE) 0.05 % ointment    5/26/2023   • doxepin (SINEquan) 25 MG capsule    5/26/2023   • doxycycline (VIBRAMYCIN) 100 MG capsule Take 1 capsule by mouth 2 (Two) Times a Day.   5/26/2023   • Eliquis 5 MG tablet tablet TAKE ONE TABLET BY MOUTH TWICE A DAY 60 tablet 0 5/26/2023   • flunisolide (NASALIDE) 25 MCG/ACT (0.025%) solution nasal spray    5/26/2023   • Fluticasone Furoate-Vilanterol (BREO ELLIPTA) 200-25 MCG/ACT inhaler Daily.   5/26/2023   • hydrocortisone 2.5 % ointment    5/26/2023   • ipratropium-albuterol (DUO-NEB) 0.5-2.5 mg/3 ml nebulizer Inhale the contents of 1 vial by nebulization 4 (Four) Times a Day. 360 mL 0 5/26/2023   • ketoconazole (NIZORAL) 2 % shampoo    5/26/2023   • lisinopril (PRINIVIL,ZESTRIL) 10 MG tablet Take 1 tablet by mouth Daily.   5/26/2023   • methocarbamol (ROBAXIN) 500 MG tablet TAKE ONE TABLET BY MOUTH FOUR TIMES A DAY AS NEEDED FOR BACK PAIN 90 tablet 0 5/26/2023   • methotrexate 2.5 MG tablet    5/26/2023   • montelukast (Singulair) 10 MG tablet Take 1 tablet by mouth Every Night. 90 tablet 1 5/26/2023   • rosuvastatin (CRESTOR) 10 MG tablet Take  by mouth Daily.   5/26/2023   • tadalafil (CIALIS) 20 MG tablet    5/26/2023   • furosemide (LASIX) 20 MG tablet TAKE ONE TABLET BY MOUTH DAILY 30 tablet 3      Current Meds:   allopurinol, 100 mg, Oral, Daily  apixaban, 5 mg, Oral, BID  Azelastine HCl, 2 spray, Each Nare, BID  dilTIAZem CD, 180 mg, Oral, Q24H  ipratropium-albuterol, 3 mL, Nebulization, 4x Daily - RT  levoFLOXacin, 750 mg, Oral, Q24H  methotrexate, 2.5 mg, Oral, Weekly  methylPREDNISolone sodium succinate, 40 mg, Intravenous, Q8H  montelukast, 10 mg, Oral, Nightly  rosuvastatin, 10 mg, Oral, Daily  senna-docusate sodium, 2 tablet, Oral, BID  sodium chloride, 10 mL, Intravenous, Q12H  traZODone, 100 mg, Oral, Daily      Allergies:  Allergies    Allergen Reactions   • Ibuprofen Shortness Of Breath   • Aspirin        Objective     Vital Signs  Temp:  [97.5 °F (36.4 °C)-98.3 °F (36.8 °C)] 98.3 °F (36.8 °C)  Heart Rate:  [] 98  Resp:  [18-22] 20  BP: (131-161)/() 161/98  Physical Exam:  General Appearance:     Alert, cooperative, in no acute distress, pleasant mood    Abdomen:     Normal bowel sounds, no masses, no organomegaly, soft     nontender, nondistended, no guarding, no rebound                 tenderness           Results Review:  I reviewed the patient's new clinical results.  I reviewed the patient's other test results and agree with the interpretation    Results from last 7 days   Lab Units 05/29/23  0708 05/28/23  0559 05/27/23  0627   WBC 10*3/mm3 21.09* 26.75* 19.18*   HEMOGLOBIN g/dL 11.7* 12.3* 13.4   HEMATOCRIT % 33.0* 35.0* 39.2   PLATELETS 10*3/mm3 322 310 270     Results from last 7 days   Lab Units 05/30/23  0429 05/29/23  0708 05/28/23  0559 05/27/23  0627   SODIUM mmol/L 133* 128* 128* 133*   POTASSIUM mmol/L 4.9 4.5 4.7 4.6   CHLORIDE mmol/L 99 94* 95* 99   CO2 mmol/L 23.6 22.2 20.2* 21.8*   BUN mg/dL 43* 37* 33* 25*   CREATININE mg/dL 1.47* 1.45* 1.80* 1.39*   CALCIUM mg/dL 9.6 9.3 9.2 9.6   BILIRUBIN mg/dL  --  2.4* 2.8* 4.2*   ALK PHOS U/L  --  143* 134* 146*   ALT (SGPT) U/L  --  88* 66* 55*   AST (SGOT) U/L  --  180* 130* 81*   GLUCOSE mg/dL 144* 136* 116* 113*         No results found for: LIPASE    Radiology:  XR Chest 1 View   Final Result   Interval worsening, with increased density of right   pulmonary opacification, small left basilar atelectasis or infiltrate.       This report was finalized on 5/29/2023 6:13 AM by Dr. Darwin House M.D.          XR Chest 1 View   Final Result          Assessment & Plan   Assessment:     1.  Transaminitis    2.  Legionella pneumonia being treated with levofloxacin  3.  Asthma exacerbation  4.  Interstitial lung disease on immunosuppression and supplemental oxygen  5.   MURIEL on CKD  6.  Atrial fibrillation requiring long-term anticoagulation  7.  Personal history of colon polyps with adenomatous changes - recall 3 years     Plan:   Liver studies including acute hepatitis panel  Liver US and Liver duplex  Continue to trend LFTs - add on hepatic function panel today  Colonoscopy scheduled with Dr. Navarro 9/19/2023 for colorectal cancer screening  Patient with worsening ALT and AST but improvement in T. bili and ALP.  Transaminitis likely secondary to sepsis secondary to Legionella pneumonia.  But we will continue to follow and review above studies.     I discussed the patients findings and my recommendations with patient.         Beverly Mallory PA-C   [facility]  [address] [place] [address]  Office: [phone]

## 2023-05-30 NOTE — CONSULTS
Patient Name: Orion Casiano  :1950  72 y.o.    Date of Admission: 2023  Date of Consultation:  23  Encounter Provider: CATHERINE Qiu  Place of Service: [facility] CARDIOLOGY  Referring Provider: Daniel Kaur MD  Patient Care Team:  Niecy Galvan MD as PCP - General (Family Medicine)  Patti Navarro MD as Consulting Physician (Gastroenterology)      Chief complaint: shortness of breath    History of Present Illness: He is a 72 year old male with interstitial lung disease, chronic back pain, asthma, hypertension, and atrial fibrillation (diagnosed in February of this year).  He was first seen by our group in February of this year when he presented with progressive shortness of breath.  At that time, he underwent a right heart catheterization demonstrated moderate pulmonary hypertension.  His symptoms improved without diuretic therapy.  Due to the atrial fibrillation he was started on anticoagulation.  He has not been seen since that time.    On , he presented to the ED due to cough and shortness of breath.  Additionally, he reported orangeish colored sputum and low-grade fevers at home.  He was also noted to have hypoxia on his ABG.  Chest x-ray demonstrated worsening right-sided infiltrate.  Respiratory panel negative.  Sputum and blood cultures were collected and he was empirically started on cefepime and vancomycin for suspected healthcare associated pneumonia.  Pulmonary was consulted and recommended higher dose of steroids as well as de-escalation of antibiotics to ceftriaxone and Zithromax.    We have been consulted for our opinion and management of the atrial fibrillation.    He says his breathing has improved.  Although he is still requiring 5L of oxygen.  He normally wears 2L at home.  He cannot feel his heart racing.          Past Medical History:   Diagnosis Date   • Acute bronchitis    • Adenomatous polyp of colon 2020     Added automatically from request for surgery [id number]   • Allergic rhinitis    • Asthma    • Bacterial pneumonia    • Bilateral lower leg cellulitis    • Colon polyp    • Cutaneous abscess of left lower limb    • Deficiency of other specified B group vitamins    • Dermatitis    • Disc disorder of lumbar region     worse at L4-5 with protusion/herniation   • Dysuria    • Effusion, right hand     resolved   • Essential (primary) hypertension    • Frequency of micturition    • Gout    • Hematuria    • Hyperglycemia    • Hyperlipidemia    • Hypertension    • ILD (interstitial lung disease)    • Insomnia    • Localized swelling, mass and lump, unspecified    • Low back pain    • Lumbago    • Microscopic hematuria    • Pain in left knee    • Sleep disturbances    • Vertigo     resolved   • Vitamin B12 deficiency        Past Surgical History:   Procedure Laterality Date   • BRONCHOSCOPY  02/27/2023    Procedure: BRONCHOSCOPY WITH FLUORO, BAL, AND BIOPSIES AND ENDOBRONCHIAL ULTRASOUND WITH FNA;  Surgeon: Rubens Sheets MD;  Location: [facility] ENDOSCOPY;  Service: Pulmonary;;  INTERSTITIAL LUNG DISEASE   • CARDIAC CATHETERIZATION N/A 02/24/2023    Procedure: Right Heart Cath;  Surgeon: Ravi Fonseca MD;  Location: [facility] CATH INVASIVE LOCATION;  Service: Cardiovascular;  Laterality: N/A;   • COLONOSCOPY      maybe 2016   • COLONOSCOPY N/A 03/11/2020    Procedure: COLONOSCOPY TO CECUM AND TI WITH COLD AND HOT SNARE AND POLYPECTOMIES;  Surgeon: Patti Navarro MD;  Location: [facility] ENDOSCOPY;  Service: Gastroenterology;  Laterality: N/A;  PRE: H/O COLON POLYPS  POST: POLYPS, HEMORRHOIDS, DIVERTICULOSIS   • LUMBAR DISC SURGERY      2008   • NOSE SURGERY           Prior to Admission medications    Medication Sig Start Date End Date Taking? Authorizing Provider   albuterol sulfate  (90 Base) MCG/ACT inhaler Inhale 2 puffs Every 6 (Six) Hours As Needed for Wheezing. 2/22/23  Yes Juanito Velazco MD    allopurinol (ZYLOPRIM) 100 MG tablet TAKE ONE TABLET BY MOUTH DAILY 5/23/23  Yes Niecy Galvan MD   amLODIPine (NORVASC) 10 MG tablet Take  by mouth Daily. 5/22/13  Yes Emergency, Nurse Epic, RN   azaTHIOprine (IMURAN) 50 MG tablet Take  by mouth Daily. 12/23/22  Yes ProviderLarry MD   Azelastine HCl 137 MCG/SPRAY solution PLACE 2 SPRAYS IN EACH NOSTRIL TWO TIMES A DAY 4/9/23  Yes Niecy Galvan MD   clobetasol (TEMOVATE) 0.05 % ointment  3/14/23  Yes ProviderLarry MD   doxepin (SINEquan) 25 MG capsule  4/3/23  Yes ProviderLarry MD   doxycycline (VIBRAMYCIN) 100 MG capsule Take 1 capsule by mouth 2 (Two) Times a Day.   Yes Provider, MD Larry   Eliquis 5 MG tablet tablet TAKE ONE TABLET BY MOUTH TWICE A DAY 5/16/23  Yes Niecy Galvan MD   flunisolide (NASALIDE) 25 MCG/ACT (0.025%) solution nasal spray  1/6/23  Yes Provider, MD Larry   Fluticasone Furoate-Vilanterol (BREO ELLIPTA) 200-25 MCG/ACT inhaler Daily. 2/9/23  Yes ProviderLarry MD   hydrocortisone 2.5 % ointment  3/13/23  Yes Provider, MD Larry   ipratropium-albuterol (DUO-NEB) 0.5-2.5 mg/3 ml nebulizer Inhale the contents of 1 vial by nebulization 4 (Four) Times a Day. 2/28/23  Yes Saritha Wallace APRN   ketoconazole (NIZORAL) 2 % shampoo  4/3/23  Yes ProviderLarry MD   lisinopril (PRINIVIL,ZESTRIL) 10 MG tablet Take 1 tablet by mouth Daily.   Yes ProviderLarry MD   methocarbamol (ROBAXIN) 500 MG tablet TAKE ONE TABLET BY MOUTH FOUR TIMES A DAY AS NEEDED FOR BACK PAIN 4/26/23  Yes Niecy Galvan MD   methotrexate 2.5 MG tablet  4/26/23  Yes ProviderLarry MD   montelukast (Singulair) 10 MG tablet Take 1 tablet by mouth Every Night. 2/18/23  Yes Niecy Galvan MD   rosuvastatin (CRESTOR) 10 MG tablet Take  by mouth Daily. 10/5/19  Yes Emergency, Nurse Celine, RN   tadalafil (CIALIS) 20 MG tablet  11/15/22  Yes Provider, MD Larry    furosemide (LASIX) 20 MG tablet TAKE ONE TABLET BY MOUTH DAILY 5/12/23   Niecy Galvan MD   traZODone (DESYREL) 100 MG tablet TAKE ONE TABLET BY MOUTH EVERY NIGHT AT BEDTIME 5/29/23   Niecy Galvan MD       Allergies   Allergen Reactions   • Ibuprofen Shortness Of Breath   • Aspirin        Social History     Socioeconomic History   • Marital status:    Tobacco Use   • Smoking status: Never   • Smokeless tobacco: Never   Vaping Use   • Vaping Use: Never used   Substance and Sexual Activity   • Alcohol use: Yes     Comment: occassionally   • Drug use: Never   • Sexual activity: Defer       Family History   Problem Relation Age of Onset   • Coronary artery disease Father        REVIEW OF SYSTEMS:   Review of Systems   Constitutional: Negative for chills, fever and malaise/fatigue.   Cardiovascular: Negative for chest pain, dyspnea on exertion, leg swelling, near-syncope, orthopnea, palpitations, paroxysmal nocturnal dyspnea and syncope.   Respiratory: Negative for cough and shortness of breath.    Musculoskeletal: Negative for joint pain, joint swelling and myalgias.   Gastrointestinal: Negative for abdominal pain, diarrhea, melena, nausea and vomiting.   Genitourinary: Negative for frequency and hematuria.   Neurological: Negative for light-headedness, numbness, paresthesias and seizures.   Allergic/Immunologic: Negative.    All other systems reviewed and are negative.          Objective:     Vitals:    05/30/23 0346 05/30/23 0642 05/30/23 0714 05/30/23 0849   BP:   156/100    BP Location:   Left arm    Patient Position:   Lying    Pulse: 87 93 107 95   Resp:   20 20   Temp:   98.3 °F (36.8 °C)    TempSrc:   Oral    SpO2: 93% 91% 92% 91%   Weight:       Height:         Body mass index is 30.05 kg/m².    Physical Exam:  Constitutional:       General: Not in acute distress.     Appearance: Well-developed. Not diaphoretic.   Eyes:      Pupils: Pupils are equal, round, and reactive to light.    HENT:      Head: Normocephalic and atraumatic.   Neck:      Thyroid: No thyromegaly.      Vascular: No JVD.   Pulmonary:      Effort: Pulmonary effort is normal. No respiratory distress.      Breath sounds: Wheezing present. Rhonchi present.   Cardiovascular:      Tachycardia present. Irregular rhythm.   Pulses:     Intact distal pulses.   Abdominal:      General: Bowel sounds are normal. There is no distension.      Palpations: Abdomen is soft. There is no hepatomegaly or splenomegaly.      Tenderness: There is no abdominal tenderness.   Musculoskeletal: Normal range of motion. Skin:     General: Skin is warm and dry.      Findings: No erythema.   Neurological:      Mental Status: Alert and oriented to person, place, and time.   Psychiatric:         Behavior: Behavior normal.         Judgment: Judgment normal.           Lab Review:   Results from last 7 days   Lab Units 05/30/23  0429 05/29/23  0708 05/28/23  0559   SODIUM mmol/L 133* 128* 128*   POTASSIUM mmol/L 4.9 4.5 4.7   CHLORIDE mmol/L 99 94* 95*   CO2 mmol/L 23.6 22.2 20.2*   BUN mg/dL 43* 37* 33*   CREATININE mg/dL 1.47* 1.45* 1.80*   GLUCOSE mg/dL 144* 136* 116*   CALCIUM mg/dL 9.6 9.3 9.2         Results from last 7 days   Lab Units 05/29/23  0708 05/28/23  0559 05/27/23  0627   WBC 10*3/mm3 21.09* 26.75* 19.18*   HEMOGLOBIN g/dL 11.7* 12.3* 13.4   HEMATOCRIT % 33.0* 35.0* 39.2   PLATELETS 10*3/mm3 322 310 270                      Previous EKG 2/23/2023        Admission EKG 5/28/2023    I personally viewed and interpreted the patient's EKG/Telemetry data.      Current Facility-Administered Medications:   •  acetaminophen (TYLENOL) tablet 650 mg, 650 mg, Oral, Q4H PRN **OR** acetaminophen (TYLENOL) 160 MG/5ML solution 650 mg, 650 mg, Oral, Q4H PRN **OR** acetaminophen (TYLENOL) suppository 650 mg, 650 mg, Rectal, Q4H PRN, Daniel Kaur MD  •  albuterol (PROVENTIL) nebulizer solution 0.083% 2.5 mg/3mL, 2.5 mg, Nebulization, Q6H PRN, Daniel Kaur MD  •   allopurinol (ZYLOPRIM) tablet 100 mg, 100 mg, Oral, Daily, Daniel Kaur MD, 100 mg at 05/30/23 0807  •  apixaban (ELIQUIS) tablet 5 mg, 5 mg, Oral, BID, Daniel Kaur MD, 5 mg at 05/30/23 0809  •  Azelastine HCl solution 274 mcg, 2 spray, Each Nare, BID, Daniel Kaur MD, 274 mcg at 05/30/23 0809  •  sennosides-docusate (PERICOLACE) 8.6-50 MG per tablet 2 tablet, 2 tablet, Oral, BID, 2 tablet at 05/28/23 0908 **AND** polyethylene glycol (MIRALAX) packet 17 g, 17 g, Oral, Daily PRN **AND** bisacodyl (DULCOLAX) EC tablet 5 mg, 5 mg, Oral, Daily PRN **AND** bisacodyl (DULCOLAX) suppository 10 mg, 10 mg, Rectal, Daily PRN, Daniel Kaur MD  •  dilTIAZem CD (CARDIZEM CD) 24 hr capsule 180 mg, 180 mg, Oral, Q24H, Mariza Page APRN  •  ipratropium-albuterol (DUO-NEB) nebulizer solution 3 mL, 3 mL, Nebulization, 4x Daily - RT, Daniel Kaur MD, 3 mL at 05/30/23 0849  •  levoFLOXacin (LEVAQUIN) tablet 750 mg, 750 mg, Oral, Q24H, Rubens Sheets MD, 750 mg at 05/30/23 0639  •  methotrexate tablet 2.5 mg, 2.5 mg, Oral, Weekly, Daniel Kaur MD, 2.5 mg at 05/30/23 0807  •  methylPREDNISolone sodium succinate (SOLU-Medrol) injection 40 mg, 40 mg, Intravenous, Q8H, Rubens Sheets MD, 40 mg at 05/30/23 0748  •  montelukast (SINGULAIR) tablet 10 mg, 10 mg, Oral, Nightly, Daniel Kaur MD, 10 mg at 05/29/23 1948  •  nitroglycerin (NITROSTAT) SL tablet 0.4 mg, 0.4 mg, Sublingual, Q5 Min PRN, Daniel Kaur MD  •  ondansetron (ZOFRAN) injection 4 mg, 4 mg, Intravenous, Q6H PRN, Daniel Kaur MD  •  rosuvastatin (CRESTOR) tablet 10 mg, 10 mg, Oral, Daily, VincentDaniel tavares MD, 10 mg at 05/30/23 0807  •  sodium chloride 0.9 % flush 10 mL, 10 mL, Intravenous, Q12H, Daniel Kaur MD, 10 mL at 05/30/23 0809  •  sodium chloride 0.9 % flush 10 mL, 10 mL, Intravenous, PRN, Daniel Kaur MD  •  sodium chloride 0.9 % infusion 40 mL, 40 mL, Intravenous, PRN, Daniel Kaur MD  •  traZODone (DESYREL) tablet 100 mg, 100 mg, Oral, Daily, Vincent,  MD Daniel, 100 mg at 05/30/23 0807    Assessment and Plan:       Active Hospital Problems    Diagnosis  POA   • **Healthcare-associated pneumonia [J18.9]  Yes   • A-fib [I48.91]  Unknown   • MURIEL (acute kidney injury) [N17.9]  Unknown   • Acute respiratory failure with hypoxia [J96.01]  Yes   • Chronic interstitial lung disease [J84.9]  Yes   • Primary hypertension [I10]  Yes   • Asthma [J45.909]  Yes      Resolved Hospital Problems   No resolved problems to display.       1.  Acute hypoxic respiratory failure/asthma exacerbation/pneumonia.  He has underlying interstitial lung disease.  Now on levofloxacin for Legionella pneumonia.  Further recommendations per pulmonary.  2.  Atrial fibrillation. Echocardiogram from February demonstrated normal LV systolic function.  Elevated heart rates not suprising in the setting on underlying lung disease and acute pneumonia. He was not on rate controlling agents at home. Will initiate diltiazem for rate and blood pressure control.  Would hold amlodipine at discharge. Continue Eliquis.   3.  Hypertension. Start diltiazem.    4.  Acute on chronic CKD. Stable.    -Overall his cardiac status is stable.  I would expect his rate control to improve as the pneumonia does. Diltiazem should help.  He will need to follow up with us after discharge. We will continue to follow along.      CATHERINE Qiu  05/30/23  08:53 EDT

## 2023-05-30 NOTE — PLAN OF CARE
Goal Outcome Evaluation:           Progress: no change  Outcome Evaluation: pt is alert and oriented, 5L o2 nasal cannula, antibiotic, IV steroid, no falls, up x1, nephro consult, no c/o pain

## 2023-05-30 NOTE — CONSULTS
[facility] Consult Note      Patient Name: Orion Casiano  : 1950  MRN: [id number]  Primary Care Physician:  Niecy Galvan MD  Referring Physician: Daniel Kaur MD  Date of admission: 2023    Subjective     Reason for Consult:  [place]    HPI:   Orion Casiano is a 72 y.o. male with a past medical history of hypertension, interstitial lung disease on Imuran prednisone who came to the hospital because of increasing shortness of air.  Patient was found to have Legionella pneumonia and was started on antibiotic as well as IV steroid.  His creatinine on admission was 1.8 mg/dL up from 0.9 mg/dL at baseline.  His creatinine today is down to 1.4 mg/dL so we were consulted to help with management of acute kidney injury and volume status.    Review of Systems:   14 point review of systems is otherwise negative except for mentioned above on HPI    Personal History     Past Medical History:   Diagnosis Date   • Acute bronchitis    • Adenomatous polyp of colon 2020    Added automatically from request for surgery [id number]   • Allergic rhinitis    • Asthma    • Bacterial pneumonia    • Bilateral lower leg cellulitis    • Colon polyp    • Cutaneous abscess of left lower limb    • Deficiency of other specified B group vitamins    • Dermatitis    • Disc disorder of lumbar region     worse at L4-5 with protusion/herniation   • Dysuria    • Effusion, right hand     resolved   • Essential (primary) hypertension    • Frequency of micturition    • Gout    • Hematuria    • Hyperglycemia    • Hyperlipidemia    • Hypertension    • ILD (interstitial lung disease)    • Insomnia    • Localized swelling, mass and lump, unspecified    • Low back pain    • Lumbago    • Microscopic hematuria    • Pain in left knee    • Sleep disturbances    • Vertigo     resolved   • Vitamin B12 deficiency        Past Surgical History:   Procedure Laterality Date   • BRONCHOSCOPY  2023    Procedure:  BRONCHOSCOPY WITH FLUORO, BAL, AND BIOPSIES AND ENDOBRONCHIAL ULTRASOUND WITH FNA;  Surgeon: Rubens Sheets MD;  Location:  [facility] ENDOSCOPY;  Service: Pulmonary;;  INTERSTITIAL LUNG DISEASE   • CARDIAC CATHETERIZATION N/A 02/24/2023    Procedure: Right Heart Cath;  Surgeon: Ravi Fonseca MD;  Location:  [facility] CATH INVASIVE LOCATION;  Service: Cardiovascular;  Laterality: N/A;   • COLONOSCOPY      maybe 2016   • COLONOSCOPY N/A 03/11/2020    Procedure: COLONOSCOPY TO CECUM AND TI WITH COLD AND HOT SNARE AND POLYPECTOMIES;  Surgeon: Patti Navarro MD;  Location:  [facility] ENDOSCOPY;  Service: Gastroenterology;  Laterality: N/A;  PRE: H/O COLON POLYPS  POST: POLYPS, HEMORRHOIDS, DIVERTICULOSIS   • LUMBAR DISC SURGERY      2008   • NOSE SURGERY         Family History: family history includes Coronary artery disease in his father.    Social History:  reports that he has never smoked. He has never used smokeless tobacco. He reports current alcohol use. He reports that he does not use drugs.    Home Medications:  Prior to Admission medications    Medication Sig Start Date End Date Taking? Authorizing Provider   albuterol sulfate  (90 Base) MCG/ACT inhaler Inhale 2 puffs Every 6 (Six) Hours As Needed for Wheezing. 2/22/23  Yes Juanito Velazco MD   allopurinol (ZYLOPRIM) 100 MG tablet TAKE ONE TABLET BY MOUTH DAILY 5/23/23  Yes Niecy Galvan MD   amLODIPine (NORVASC) 10 MG tablet Take  by mouth Daily. 5/22/13  Yes Emergency, Nurse Epic, RN   azaTHIOprine (IMURAN) 50 MG tablet Take  by mouth Daily. 12/23/22  Yes Larry Stark MD   Azelastine HCl 137 MCG/SPRAY solution PLACE 2 SPRAYS IN EACH NOSTRIL TWO TIMES A DAY 4/9/23  Yes Niecy Galvan MD   clobetasol (TEMOVATE) 0.05 % ointment  3/14/23  Yes Larry Stark MD   doxepin (SINEquan) 25 MG capsule  4/3/23  Yes Larry Stark MD   doxycycline (VIBRAMYCIN) 100 MG capsule Take 1 capsule by mouth 2 (Two) Times a Day.    Yes ProviderLarry MD   Eliquis 5 MG tablet tablet TAKE ONE TABLET BY MOUTH TWICE A DAY 5/16/23  Yes Niecy Galvan MD   flunisolide (NASALIDE) 25 MCG/ACT (0.025%) solution nasal spray  1/6/23  Yes Larry Stark MD   Fluticasone Furoate-Vilanterol (BREO ELLIPTA) 200-25 MCG/ACT inhaler Daily. 2/9/23  Yes Larry Stark MD   hydrocortisone 2.5 % ointment  3/13/23  Yes ProviderLarry MD   ipratropium-albuterol (DUO-NEB) 0.5-2.5 mg/3 ml nebulizer Inhale the contents of 1 vial by nebulization 4 (Four) Times a Day. 2/28/23  Yes Saritha Wallace APRN   ketoconazole (NIZORAL) 2 % shampoo  4/3/23  Yes ProviderLarry MD   lisinopril (PRINIVIL,ZESTRIL) 10 MG tablet Take 1 tablet by mouth Daily.   Yes Provider, MD Larry   methocarbamol (ROBAXIN) 500 MG tablet TAKE ONE TABLET BY MOUTH FOUR TIMES A DAY AS NEEDED FOR BACK PAIN 4/26/23  Yes Niecy Galvan MD   methotrexate 2.5 MG tablet  4/26/23  Yes ProviderLarry MD   montelukast (Singulair) 10 MG tablet Take 1 tablet by mouth Every Night. 2/18/23  Yes Niecy Galvan MD   rosuvastatin (CRESTOR) 10 MG tablet Take  by mouth Daily. 10/5/19  Yes Emergency, Nurse Celine, RN   tadalafil (CIALIS) 20 MG tablet  11/15/22  Yes Provider, MD Larry   furosemide (LASIX) 20 MG tablet TAKE ONE TABLET BY MOUTH DAILY 5/12/23   Niecy Galvan MD   traZODone (DESYREL) 100 MG tablet TAKE ONE TABLET BY MOUTH EVERY NIGHT AT BEDTIME 5/29/23   Niecy Galvan MD       Allergies:  Allergies   Allergen Reactions   • Ibuprofen Shortness Of Breath   • Aspirin        Objective     Vitals:   Temp:  [97.5 °F (36.4 °C)-98.3 °F (36.8 °C)] 98.1 °F (36.7 °C)  Heart Rate:  [] 99  Resp:  [18-22] 20  BP: (131-161)/() 147/107  Flow (L/min):  [5-6] 5    Intake/Output Summary (Last 24 hours) at 5/30/2023 1322  Last data filed at 5/30/2023 0757  Gross per 24 hour   Intake 1560 ml   Output 920 ml   Net 640 ml        Physical Exam:    General Appearance: alert, oriented x 3, no acute distress   Skin: warm and dry  HEENT: oral mucosa normal, nonicteric sclera  Neck: supple, no JVD  Lungs: CTA  Heart: RRR, normal S1 and S2  Abdomen: soft, nontender, nondistended  : no palpable bladder  Extremities: no edema, cyanosis or clubbing  Neuro: normal speech and mental status     Scheduled Meds:     allopurinol, 100 mg, Oral, Daily  apixaban, 5 mg, Oral, BID  Azelastine HCl, 2 spray, Each Nare, BID  dilTIAZem CD, 180 mg, Oral, Q24H  ipratropium-albuterol, 3 mL, Nebulization, 4x Daily - RT  levoFLOXacin, 750 mg, Oral, Q24H  methotrexate, 2.5 mg, Oral, Weekly  methylPREDNISolone sodium succinate, 40 mg, Intravenous, Q8H  montelukast, 10 mg, Oral, Nightly  rosuvastatin, 10 mg, Oral, Daily  senna-docusate sodium, 2 tablet, Oral, BID  sodium chloride, 10 mL, Intravenous, Q12H  traZODone, 100 mg, Oral, Daily      IV Meds:        Results Reviewed:   I have personally reviewed the results from the time of this admission to 5/30/2023 13:22 EDT     Lab Results   Component Value Date    GLUCOSE 144 (H) 05/30/2023    CALCIUM 9.6 05/30/2023     (L) 05/30/2023    K 4.9 05/30/2023    CO2 23.6 05/30/2023    CL 99 05/30/2023    BUN 43 (H) 05/30/2023    CREATININE 1.47 (H) 05/30/2023    EGFRIFAFRI 111 01/18/2021    BCR 29.3 (H) 05/30/2023    ANIONGAP 10.4 05/30/2023      Lab Results   Component Value Date    MG 2.1 02/24/2023    ALBUMIN 2.7 (L) 05/29/2023           Assessment / Plan     ASSESSMENT:    1.  Acute kidney injury: Creatinine is up at 1.8 mg/dL at time of admission from baseline of 0.9 however it has been improving with latest creatinine 1.4 mg/dL that is likely due to sepsis and decreased renal perfusion in setting of renal autoregulation impairment due to lisinopril.  I will continue to hold diuretic and ACE inhibitor at this point.  Kidney function continues to improve back to baseline.  We will check bladder scan postvoid  residual.  Adjust medications AND clearance less than 30 mils per minute per meter square we will plan to start diuretic in the next day or 2    2.  Hyponatremia: Improving with last sodium 133  3.  Interstitial lung disease in the right Legionella pneumonia management per pulmonary.  Patient currently on steroid and Levaquin  4.  History of pulm hypertension with last pulmonary arterial pressures 34 and PCWP at 19.  5.  Immunosuppression currently on Imuran and prednisone  6.  History of atrial fibrillation      PLAN:    1.  Continue to hold lisinopril and torsemide.  Plan to restart in the next coming days  2.  Patient was started on Cardizem for heart rate control.  We will monitor blood pressure closely  3.  Surveillance labs    Thank you for involving us in the care of Orion Casiano.  Please feel free to call with any questions.    Lesly Blackman MD  05/30/23  13:22 EDT    [facility]  [phone]

## 2023-05-31 ENCOUNTER — APPOINTMENT (OUTPATIENT)
Dept: CARDIOLOGY | Facility: HOSPITAL | Age: 73
End: 2023-05-31
Payer: MEDICARE

## 2023-05-31 ENCOUNTER — APPOINTMENT (OUTPATIENT)
Dept: ULTRASOUND IMAGING | Facility: HOSPITAL | Age: 73
End: 2023-05-31
Payer: MEDICARE

## 2023-05-31 LAB
ALBUMIN SERPL-MCNC: 3.1 G/DL (ref 3.5–5.2)
ALP SERPL-CCNC: 155 U/L (ref 39–117)
ALT SERPL W P-5'-P-CCNC: 94 U/L (ref 1–41)
ANION GAP SERPL CALCULATED.3IONS-SCNC: 10.6 MMOL/L (ref 5–15)
AST SERPL-CCNC: 119 U/L (ref 1–40)
BACTERIA SPEC AEROBE CULT: NORMAL
BACTERIA SPEC AEROBE CULT: NORMAL
BH CV VAS HEPATIC PORTAL VEIN DIAMETER: 1.3 CM
BH CV VAS HEPATOPORTAL HEPATIC V LT DIRECTION: NORMAL
BH CV VAS HEPATOPORTAL HEPATIC V MID DIRECTION: NORMAL
BH CV VAS HEPATOPORTAL HEPATIC V RT DIRECTION: NORMAL
BH CV VAS HEPATOPORTAL IVC SPONT: NORMAL
BH CV VAS HEPATOPORTAL PORTAL V EXTRAHEPATIC DIRECTION: NORMAL
BH CV VAS HEPATOPORTAL PORTAL V LT INTRA DIRECTION: NORMAL
BH CV VAS HEPATOPORTAL PORTAL V MAIN INTRA DIRECTION: NORMAL
BH CV VAS HEPATOPORTAL PORTAL V PSV: 31 CM/S
BH CV VAS HEPATOPORTAL PORTAL V RT INTRA DIRECTION: NORMAL
BH CV VAS HEPATOPORTAL SPLENIC DIRECTION: NORMAL
BH CV VAS SMA HEPATIC EDV: 27 CM/S
BH CV VAS SMA HEPATIC PSV: 96 CM/S
BH CV VAS SMA SPLENIC EDV: 21 CM/S
BH CV VAS SMA SPLENIC PSV: 50 CM/S
BILIRUB CONJ SERPL-MCNC: 0.7 MG/DL (ref 0–0.3)
BILIRUB INDIRECT SERPL-MCNC: 0.5 MG/DL
BILIRUB SERPL-MCNC: 1.2 MG/DL (ref 0–1.2)
BUN SERPL-MCNC: 44 MG/DL (ref 8–23)
BUN/CREAT SERPL: 35.5 (ref 7–25)
CALCIUM SPEC-SCNC: 10.1 MG/DL (ref 8.6–10.5)
CHLORIDE SERPL-SCNC: 100 MMOL/L (ref 98–107)
CO2 SERPL-SCNC: 25.4 MMOL/L (ref 22–29)
CREAT SERPL-MCNC: 1.24 MG/DL (ref 0.76–1.27)
DEPRECATED RDW RBC AUTO: 46.9 FL (ref 37–54)
EGFRCR SERPLBLD CKD-EPI 2021: 61.8 ML/MIN/1.73
ERYTHROCYTE [DISTWIDTH] IN BLOOD BY AUTOMATED COUNT: 14 % (ref 12.3–15.4)
GLUCOSE SERPL-MCNC: 140 MG/DL (ref 65–99)
HCT VFR BLD AUTO: 38.7 % (ref 37.5–51)
HGB BLD-MCNC: 13.2 G/DL (ref 13–17.7)
LYMPHOCYTES # BLD MANUAL: 2.23 10*3/MM3 (ref 0.7–3.1)
LYMPHOCYTES NFR BLD MANUAL: 4 % (ref 5–12)
MAXIMAL PREDICTED HEART RATE: 148 BPM
MCH RBC QN AUTO: 31.4 PG (ref 26.6–33)
MCHC RBC AUTO-ENTMCNC: 34.1 G/DL (ref 31.5–35.7)
MCV RBC AUTO: 91.9 FL (ref 79–97)
MONOCYTES # BLD: 0.64 10*3/MM3 (ref 0.1–0.9)
MYELOCYTES NFR BLD MANUAL: 3 % (ref 0–0)
NEUTROPHILS # BLD AUTO: 12.6 10*3/MM3 (ref 1.7–7)
NEUTROPHILS NFR BLD MANUAL: 79 % (ref 42.7–76)
NRBC BLD AUTO-RTO: 0.1 /100 WBC (ref 0–0.2)
PLAT MORPH BLD: NORMAL
PLATELET # BLD AUTO: 459 10*3/MM3 (ref 140–450)
PMV BLD AUTO: 10.1 FL (ref 6–12)
POTASSIUM SERPL-SCNC: 5 MMOL/L (ref 3.5–5.2)
PROT SERPL-MCNC: 7.4 G/DL (ref 6–8.5)
QT INTERVAL: 326 MS
RBC # BLD AUTO: 4.21 10*6/MM3 (ref 4.14–5.8)
RBC MORPH BLD: NORMAL
SODIUM SERPL-SCNC: 136 MMOL/L (ref 136–145)
STRESS TARGET HR: 126 BPM
VARIANT LYMPHS NFR BLD MANUAL: 12 % (ref 19.6–45.3)
VARIANT LYMPHS NFR BLD MANUAL: 2 % (ref 0–5)
WBC MORPH BLD: NORMAL
WBC NRBC COR # BLD: 15.95 10*3/MM3 (ref 3.4–10.8)

## 2023-05-31 PROCEDURE — 80048 BASIC METABOLIC PNL TOTAL CA: CPT | Performed by: INTERNAL MEDICINE

## 2023-05-31 PROCEDURE — 63710000001 PREDNISONE PER 1 MG: Performed by: INTERNAL MEDICINE

## 2023-05-31 PROCEDURE — 76705 ECHO EXAM OF ABDOMEN: CPT

## 2023-05-31 PROCEDURE — 94799 UNLISTED PULMONARY SVC/PX: CPT

## 2023-05-31 PROCEDURE — 94760 N-INVAS EAR/PLS OXIMETRY 1: CPT

## 2023-05-31 PROCEDURE — 94761 N-INVAS EAR/PLS OXIMETRY MLT: CPT

## 2023-05-31 PROCEDURE — 25010000002 METHYLPREDNISOLONE PER 40 MG: Performed by: INTERNAL MEDICINE

## 2023-05-31 PROCEDURE — 94664 DEMO&/EVAL PT USE INHALER: CPT

## 2023-05-31 PROCEDURE — 85025 COMPLETE CBC W/AUTO DIFF WBC: CPT | Performed by: INTERNAL MEDICINE

## 2023-05-31 PROCEDURE — 85007 BL SMEAR W/DIFF WBC COUNT: CPT | Performed by: INTERNAL MEDICINE

## 2023-05-31 PROCEDURE — 99232 SBSQ HOSP IP/OBS MODERATE 35: CPT | Performed by: INTERNAL MEDICINE

## 2023-05-31 PROCEDURE — 93975 VASCULAR STUDY: CPT

## 2023-05-31 PROCEDURE — 80076 HEPATIC FUNCTION PANEL: CPT | Performed by: INTERNAL MEDICINE

## 2023-05-31 RX ORDER — DILTIAZEM HYDROCHLORIDE 180 MG/1
180 CAPSULE, COATED, EXTENDED RELEASE ORAL EVERY 12 HOURS
Status: DISCONTINUED | OUTPATIENT
Start: 2023-05-31 | End: 2023-06-06 | Stop reason: HOSPADM

## 2023-05-31 RX ORDER — PREDNISONE 20 MG/1
20 TABLET ORAL 2 TIMES DAILY WITH MEALS
Status: DISCONTINUED | OUTPATIENT
Start: 2023-05-31 | End: 2023-06-01

## 2023-05-31 RX ORDER — TORSEMIDE 20 MG/1
40 TABLET ORAL DAILY
Status: DISCONTINUED | OUTPATIENT
Start: 2023-05-31 | End: 2023-06-01

## 2023-05-31 RX ADMIN — AZELASTINE HYDROCHLORIDE 274 MCG: 137 SPRAY, METERED NASAL at 20:11

## 2023-05-31 RX ADMIN — LEVOFLOXACIN 750 MG: 750 TABLET, FILM COATED ORAL at 06:21

## 2023-05-31 RX ADMIN — IPRATROPIUM BROMIDE AND ALBUTEROL SULFATE 3 ML: 2.5; .5 SOLUTION RESPIRATORY (INHALATION) at 11:49

## 2023-05-31 RX ADMIN — DILTIAZEM HYDROCHLORIDE 180 MG: 180 CAPSULE, COATED, EXTENDED RELEASE ORAL at 10:01

## 2023-05-31 RX ADMIN — APIXABAN 5 MG: 5 TABLET, FILM COATED ORAL at 10:01

## 2023-05-31 RX ADMIN — Medication 10 ML: at 20:11

## 2023-05-31 RX ADMIN — IPRATROPIUM BROMIDE AND ALBUTEROL SULFATE 3 ML: 2.5; .5 SOLUTION RESPIRATORY (INHALATION) at 20:45

## 2023-05-31 RX ADMIN — APIXABAN 5 MG: 5 TABLET, FILM COATED ORAL at 20:11

## 2023-05-31 RX ADMIN — ALLOPURINOL 100 MG: 100 TABLET ORAL at 10:01

## 2023-05-31 RX ADMIN — TRAZODONE HYDROCHLORIDE 100 MG: 100 TABLET ORAL at 10:01

## 2023-05-31 RX ADMIN — METHYLPREDNISOLONE SODIUM SUCCINATE 40 MG: 40 INJECTION, POWDER, LYOPHILIZED, FOR SOLUTION INTRAMUSCULAR; INTRAVENOUS at 00:34

## 2023-05-31 RX ADMIN — TORSEMIDE 40 MG: 20 TABLET ORAL at 10:00

## 2023-05-31 RX ADMIN — AZELASTINE HYDROCHLORIDE 274 MCG: 137 SPRAY, METERED NASAL at 10:01

## 2023-05-31 RX ADMIN — IPRATROPIUM BROMIDE AND ALBUTEROL SULFATE 3 ML: 2.5; .5 SOLUTION RESPIRATORY (INHALATION) at 15:32

## 2023-05-31 RX ADMIN — METHYLPREDNISOLONE SODIUM SUCCINATE 40 MG: 40 INJECTION, POWDER, LYOPHILIZED, FOR SOLUTION INTRAMUSCULAR; INTRAVENOUS at 10:00

## 2023-05-31 RX ADMIN — DILTIAZEM HYDROCHLORIDE 180 MG: 180 CAPSULE, COATED, EXTENDED RELEASE ORAL at 20:11

## 2023-05-31 RX ADMIN — PREDNISONE 20 MG: 20 TABLET ORAL at 19:07

## 2023-05-31 RX ADMIN — IPRATROPIUM BROMIDE AND ALBUTEROL SULFATE 3 ML: 2.5; .5 SOLUTION RESPIRATORY (INHALATION) at 07:57

## 2023-05-31 RX ADMIN — MONTELUKAST SODIUM 10 MG: 10 TABLET, FILM COATED ORAL at 20:11

## 2023-05-31 RX ADMIN — Medication 10 ML: at 10:01

## 2023-05-31 RX ADMIN — ROSUVASTATIN CALCIUM 10 MG: 10 TABLET, FILM COATED ORAL at 10:01

## 2023-05-31 NOTE — PROGRESS NOTES
[facility] Progress Note      Patient Name: Orion Casiano  : 1950  MRN: [id number]  Primary Care Physician:  Niecy Galvan MD  Date of admission: 2023    Subjective     Interval History:     Seen and examined on 5 L nasal cannula.  Feels better.  Continues to cough.    Review of Systems:   As noted above    Objective     Vitals:   Temp:  [97.2 °F (36.2 °C)-98.1 °F (36.7 °C)] 98 °F (36.7 °C)  Heart Rate:  [] 116  Resp:  [18-20] 18  BP: (134-161)/() 145/98  Flow (L/min):  [5] 5    Intake/Output Summary (Last 24 hours) at 2023 0744  Last data filed at 2023 2141  Gross per 24 hour   Intake 1080 ml   Output 1250 ml   Net -170 ml       Physical Exam:    General Appearance: alert, oriented x 3, no acute distress   Skin: warm and dry  HEENT: oral mucosa normal, nonicteric sclera  Neck: supple, no JVD  Lungs: CTA  Heart: RRR, normal S1 and S2  Abdomen: soft, nontender, nondistended  : no palpable bladder  Extremities: no edema, cyanosis or clubbing  Neuro: normal speech and mental status     Scheduled Meds:     allopurinol, 100 mg, Oral, Daily  apixaban, 5 mg, Oral, BID  Azelastine HCl, 2 spray, Each Nare, BID  dilTIAZem CD, 180 mg, Oral, Q24H  ipratropium-albuterol, 3 mL, Nebulization, 4x Daily - RT  levoFLOXacin, 750 mg, Oral, Q24H  methotrexate, 2.5 mg, Oral, Weekly  methylPREDNISolone sodium succinate, 40 mg, Intravenous, Q8H  montelukast, 10 mg, Oral, Nightly  rosuvastatin, 10 mg, Oral, Daily  senna-docusate sodium, 2 tablet, Oral, BID  sodium chloride, 10 mL, Intravenous, Q12H  traZODone, 100 mg, Oral, Daily      IV Meds:        Results Reviewed:   I have personally reviewed the results from the time of this admission to 2023 07:44 EDT     Results from last 7 days   Lab Units 23  0456 23  0429 23  0708 23  0559   SODIUM mmol/L 136 133* 128* 128*   POTASSIUM mmol/L 5.0 4.9 4.5 4.7   CHLORIDE mmol/L 100 99 94* 95*    CO2 mmol/L 25.4 23.6 22.2 20.2*   BUN mg/dL 44* 43* 37* 33*   CREATININE mg/dL 1.24 1.47* 1.45* 1.80*   CALCIUM mg/dL 10.1 9.6 9.3 9.2   BILIRUBIN mg/dL  --  1.4* 2.4* 2.8*   ALK PHOS U/L  --  161* 143* 134*   ALT (SGPT) U/L  --  90* 88* 66*   AST (SGOT) U/L  --  166* 180* 130*   GLUCOSE mg/dL 140* 144* 136* 116*     Estimated Creatinine Clearance: 69.1 mL/min (by C-G formula based on SCr of 1.24 mg/dL).      Results from last 7 days   Lab Units 05/29/23  0708   URIC ACID mg/dL 7.6*     Results from last 7 days   Lab Units 05/31/23  0456 05/29/23  0708 05/28/23  0559 05/27/23  0627 05/26/23  1429   WBC 10*3/mm3 15.95* 21.09* 26.75* 19.18* 22.52*   HEMOGLOBIN g/dL 13.2 11.7* 12.3* 13.4 13.7   PLATELETS 10*3/mm3 459* 322 310 270 266           Assessment / Plan     ASSESSMENT:  1.  Acute kidney injury: Creatinine is up at 1.8 mg/dL at time of admission from baseline of 0.9 however it has been improving with latest creatinine 1.4 mg/dL that is likely due to sepsis and decreased renal perfusion in setting of renal autoregulation impairment due to lisinopril.      2.  Hyponatremia: Improving with last sodium 133  3.  Interstitial lung disease in the right Legionella pneumonia management per pulmonary.  Patient currently on steroid and Levaquin  4.  History of pulm hypertension with last pulmonary arterial pressures 34 and PCWP at 19.  5.  Immunosuppression currently on Imuran and prednisone  6.  History of atrial fibrillation        PLAN:     1.  Continue to hold lisinopril blood pressure: Restart torsemide at 40 mg p.o. daily  2.   Surveillance labs    Thank you for involving us in the care of Orion Casiano.  Please feel free to call with any questions.    Lesly Blackman MD  05/31/23  07:44 EDT    [facility] [place] [phone]

## 2023-05-31 NOTE — PLAN OF CARE
Goal Outcome Evaluation:  Plan of Care Reviewed With: patient        Progress: no change  Outcome Evaluation: Patient A&Ox4.  On 5 L NC.  Patient at times takes oxgen off and his stats drop into the upper 70's.  NPO since midnight.  Liver US and Liver Duplex to be performed today.  VSS.  WCTM for the rest of the shift.

## 2023-05-31 NOTE — PROGRESS NOTES
[facility] Follow Up    Chief Complaint: Follow up afib    Interval History: Remains in rate controlled atrial fibrillation.  Denies any palpitations.  Breathing a little bit better.  Denies any chest pain.    Objective:     Objective:  Temp:  [97.2 °F (36.2 °C)-98.1 °F (36.7 °C)] 98 °F (36.7 °C)  Heart Rate:  [] 116  Resp:  [18-20] 18  BP: (134-161)/() 145/98     Intake/Output Summary (Last 24 hours) at 5/31/2023 0736  Last data filed at 5/30/2023 2141  Gross per 24 hour   Intake 1080 ml   Output 1250 ml   Net -170 ml     Body mass index is 30.05 kg/m².      05/26/23  1339 05/26/23  2146 05/27/23  0520   Weight: 103 kg (228 lb) 104 kg (228 lb 14.4 oz) 105 kg (230 lb 14.4 oz)     Weight change:       Physical Exam:   General : Alert, cooperative, in no acute distress.  Neuro: Alert,cooperative and oriented.  Lungs: CTAB. Normal respiratory effort and rate.  CV: Irregularly, irregular, normal S1 and S2, no murmurs, gallops or rubs.  ABD: Soft, nontender, nondistended. Positive bowel sounds.  Extr: No edema or cyanosis, moves all extremities.    Lab Review:   Results from last 7 days   Lab Units 05/31/23  0456 05/30/23  0429 05/29/23  0708   SODIUM mmol/L 136 133* 128*   POTASSIUM mmol/L 5.0 4.9 4.5   CHLORIDE mmol/L 100 99 94*   CO2 mmol/L 25.4 23.6 22.2   BUN mg/dL 44* 43* 37*   CREATININE mg/dL 1.24 1.47* 1.45*   GLUCOSE mg/dL 140* 144* 136*   CALCIUM mg/dL 10.1 9.6 9.3   AST (SGOT) U/L  --  166* 180*   ALT (SGPT) U/L  --  90* 88*         Results from last 7 days   Lab Units 05/31/23  0456 05/29/23  0708   WBC 10*3/mm3 15.95* 21.09*   HEMOGLOBIN g/dL 13.2 11.7*   HEMATOCRIT % 38.7 33.0*   PLATELETS 10*3/mm3 459* 322                   Invalid input(s): LDLCALC      Results from last 7 days   Lab Units 05/30/23  0429   TSH uIU/mL 0.257*     I reviewed the patient's new clinical results.  I personally viewed and interpreted the patient's EKG  Current Medications:   Scheduled  Meds:allopurinol, 100 mg, Oral, Daily  apixaban, 5 mg, Oral, BID  Azelastine HCl, 2 spray, Each Nare, BID  dilTIAZem CD, 180 mg, Oral, Q24H  ipratropium-albuterol, 3 mL, Nebulization, 4x Daily - RT  levoFLOXacin, 750 mg, Oral, Q24H  methotrexate, 2.5 mg, Oral, Weekly  methylPREDNISolone sodium succinate, 40 mg, Intravenous, Q8H  montelukast, 10 mg, Oral, Nightly  rosuvastatin, 10 mg, Oral, Daily  senna-docusate sodium, 2 tablet, Oral, BID  sodium chloride, 10 mL, Intravenous, Q12H  traZODone, 100 mg, Oral, Daily      Continuous Infusions:     Allergies:  Allergies   Allergen Reactions   • Ibuprofen Shortness Of Breath   • Aspirin        Assessment/Plan:      1.  Legionella pneumonia.  Noted antibiotics.  2.  Asthma with exacerbation.  3.  Acute on chronic hypoxic respiratory failure.  Remains on 5 L nasal cannula.  4.  Atrial fibrillation.  Remains persistent.  Rates better controlled with diltiazem.  On anticoagulation with apixaban.  5.  Hypertension.  Remains mildly elevated despite the initiation of diltiazem.  6.  Acute on chronic CKD.  Improved.    - Increase diltiazem to 180 mg twice a day.  -Continue apixaban.    Alejandra Terrell MD  05/31/23  07:36 EDT

## 2023-05-31 NOTE — DISCHARGE PLACEMENT REQUEST
"Orion Casiano (72 y.o. Male)     Date of Birth   1950    Social Security Number       Address   [address] [place] [address]  [place] [address]    Home Phone   [phone]    MRN   [id number]       [employer]    Marital Status                               Admission Date   5/26/23    Admission Type   Emergency    Admitting Provider   Daniel Kaur MD    Attending Provider   Roderick Basilio MD    Department, Room/Bed   [address], [address]       Discharge Date       Discharge Disposition       Discharge Destination                               Attending Provider: Roderick Basilio MD    Allergies: Ibuprofen, Aspirin    Isolation: None   Infection: None   Code Status: CPR    Ht: 186.7 cm (73.5\")   Wt: 105 kg (230 lb 14.4 oz)    Admission Cmt: None   Principal Problem: Healthcare-associated pneumonia [J18.9]                 Active Insurance as of 5/26/2023     Primary Coverage     Payor Plan Insurance Group Employer/Plan Group    HUMANA MEDICARE REPLACEMENT HUMANA MEDICARE REPLACEMENT [id number]     Payor Plan Address Payor Plan Phone Number Payor Plan Fax Number Effective Dates    [address] [address] [phone]  1/1/2018 - None Entered    [facility] [address]       Subscriber Name Subscriber Birth Date Member ID       ORION CASIANO 1950 [id number]                 Emergency Contacts      (Rel.) Home Phone Work Phone Mobile Phone    RORY CASIANO (Son) -- -- [phone]              "

## 2023-05-31 NOTE — PROGRESS NOTES
Name: Orion Casiano ADMIT: 2023   : 1950  PCP: Niecy Galvan MD    MRN: [id number] LOS: 5 days   AGE/SEX: 72 y.o. male  ROOM: [facility]     Subjective   Subjective   Patient is seen at bedside, no new complaints.       Objective   Objective   Vital Signs  Temp:  [97.2 °F (36.2 °C)-98.5 °F (36.9 °C)] 98.5 °F (36.9 °C)  Heart Rate:  [] 84  Resp:  [18-20] 20  BP: (134-152)/(82-98) 144/96  SpO2:  [91 %-98 %] 97 %  on  Flow (L/min):  [4-5] 4;   Device (Oxygen Therapy): nasal cannula  Body mass index is 30.05 kg/m².  Physical Exam   General, awake and alert.  Head and ENT, normocephalic and atraumatic.  Lungs, symmetric expansion, equal air entry bilaterally.  Heart, regular rate and rhythm.  Abdomen, soft and nontender.  Extremities, no clubbing or cyanosis.  Neuro, no focal deficits.  Skin: Warm and no rash.  Psych, normal mood and affect.  Musculoskeletal, joint examination is grossly normal.     Copied text material from yesterday's note has been reviewed for appropriate changes and remains accurate as of 23.      Results Review     I reviewed the patient's new clinical results.  Results from last 7 days   Lab Units 23  0456 23  0708 23  0559 23  0627   WBC 10*3/mm3 15.95* 21.09* 26.75* 19.18*   HEMOGLOBIN g/dL 13.2 11.7* 12.3* 13.4   PLATELETS 10*3/mm3 459* 322 310 270     Results from last 7 days   Lab Units 23  0456 23  0429 23  0708 23  0559   SODIUM mmol/L 136 133* 128* 128*   POTASSIUM mmol/L 5.0 4.9 4.5 4.7   CHLORIDE mmol/L 100 99 94* 95*   CO2 mmol/L 25.4 23.6 22.2 20.2*   BUN mg/dL 44* 43* 37* 33*   CREATININE mg/dL 1.24 1.47* 1.45* 1.80*   GLUCOSE mg/dL 140* 144* 136* 116*   EGFR mL/min/1.73 61.8 50.4* 51.2* 39.5*     Results from last 7 days   Lab Units 23  0456 23  0429 23  0708 23  0559   ALBUMIN g/dL 3.1* 2.9* 2.7* 2.9*   BILIRUBIN mg/dL 1.2 1.4* 2.4* 2.8*   ALK PHOS U/L 155* 161* 143* 134*    AST (SGOT) U/L 119* 166* 180* 130*   ALT (SGPT) U/L 94* 90* 88* 66*     Results from last 7 days   Lab Units 05/31/23  0456 05/30/23  0429 05/29/23  0708 05/28/23  0559   CALCIUM mg/dL 10.1 9.6 9.3 9.2   ALBUMIN g/dL 3.1* 2.9* 2.7* 2.9*     Results from last 7 days   Lab Units 05/26/23  1727 05/26/23  1429   PROCALCITONIN ng/mL  --  9.19*   LACTATE mmol/L 2.0 2.8*     No results found for: HGBA1C, POCGLU    US Liver    Result Date: 5/31/2023  Hepatomegaly. There is no intra or extrahepatic biliary dilatation.  This report was finalized on 5/31/2023 5:59 AM by Dr. Jacqueline Adkins M.D.      I have personally reviewed all medications:  Scheduled Medications  allopurinol, 100 mg, Oral, Daily  apixaban, 5 mg, Oral, BID  Azelastine HCl, 2 spray, Each Nare, BID  dilTIAZem CD, 180 mg, Oral, Q12H  ipratropium-albuterol, 3 mL, Nebulization, 4x Daily - RT  levoFLOXacin, 750 mg, Oral, Q24H  methotrexate, 2.5 mg, Oral, Weekly  montelukast, 10 mg, Oral, Nightly  predniSONE, 20 mg, Oral, BID With Meals  rosuvastatin, 10 mg, Oral, Daily  senna-docusate sodium, 2 tablet, Oral, BID  sodium chloride, 10 mL, Intravenous, Q12H  torsemide, 40 mg, Oral, Daily  traZODone, 100 mg, Oral, Daily    Infusions   Diet  Diet: Renal Diets; Low Sodium (2-3g), Low Potassium, Low Phosphorus; Texture: Regular Texture (IDDSI 7); Fluid Consistency: Thin (IDDSI 0)    I have personally reviewed:  [x]  Laboratory   [x]  Microbiology   [x]  Radiology   [x]  EKG/Telemetry  [x]  Cardiology/Vascular   []  Pathology    []  Records       Assessment/Plan     Active Hospital Problems    Diagnosis  POA   • **Healthcare-associated pneumonia [J18.9]  Yes   • A-fib [I48.91]  Unknown   • MURIEL (acute kidney injury) [N17.9]  Unknown   • Acute respiratory failure with hypoxia [J96.01]  Yes   • Chronic interstitial lung disease [J84.9]  Yes   • Primary hypertension [I10]  Yes   • Asthma [J45.909]  Yes      Resolved Hospital Problems   No resolved problems to display.        72 y.o. male admitted with Healthcare-associated pneumonia.      Assessment and plan:  1.  Acute hypoxemic respiratory failure, with asthma exacerbation and pneumonia.  Patient has underlying interstitial lung disease, continue to follow pulmonary recommendations.  Continue supplemental oxygen.  Continue Levaquin.      2.  Asthma exacerbation, management as above.     3.  Acute on chronic kidney injury, renal function is improving.  He also has hyponatremia, will benefit from nephrology evaluation.     4.  Hypertension, continue to monitor BP trend.     5.  Atrial fibrillation, patient is currently rate controlled.  He is on anticoagulation with Eliquis.     6.  CODE STATUS is full code.  Further plans based on hospital course..      Roderick Basilio MD  [facility]  05/31/23  15:16 EDT

## 2023-05-31 NOTE — PROGRESS NOTES
[facility]  Inpatient Progress Note    Reason for Follow Up: Transaminitis    Subjective     Interval History:   Still soa, some cough.  Denies nausea or vomiting.  No abdominal pain.  Appetite is not great.    Current Facility-Administered Medications:   •  acetaminophen (TYLENOL) tablet 650 mg, 650 mg, Oral, Q4H PRN **OR** acetaminophen (TYLENOL) 160 MG/5ML solution 650 mg, 650 mg, Oral, Q4H PRN **OR** acetaminophen (TYLENOL) suppository 650 mg, 650 mg, Rectal, Q4H PRN, Daniel Kaur MD  •  albuterol (PROVENTIL) nebulizer solution 0.083% 2.5 mg/3mL, 2.5 mg, Nebulization, Q6H PRN, Daniel Kaur MD  •  allopurinol (ZYLOPRIM) tablet 100 mg, 100 mg, Oral, Daily, Daniel Kaur MD, 100 mg at 05/31/23 1001  •  apixaban (ELIQUIS) tablet 5 mg, 5 mg, Oral, BID, Daniel Kaur MD, 5 mg at 05/31/23 1001  •  Azelastine HCl solution 274 mcg, 2 spray, Each Nare, BID, Daniel Kaur MD, 274 mcg at 05/31/23 1001  •  sennosides-docusate (PERICOLACE) 8.6-50 MG per tablet 2 tablet, 2 tablet, Oral, BID, 2 tablet at 05/30/23 2003 **AND** polyethylene glycol (MIRALAX) packet 17 g, 17 g, Oral, Daily PRN **AND** bisacodyl (DULCOLAX) EC tablet 5 mg, 5 mg, Oral, Daily PRN **AND** bisacodyl (DULCOLAX) suppository 10 mg, 10 mg, Rectal, Daily PRN, Daniel Kaur MD  •  dilTIAZem CD (CARDIZEM CD) 24 hr capsule 180 mg, 180 mg, Oral, Q12H, Alejandra Terrell MD  •  ipratropium-albuterol (DUO-NEB) nebulizer solution 3 mL, 3 mL, Nebulization, 4x Daily - RT, Daniel Kaur MD, 3 mL at 05/31/23 0757  •  levoFLOXacin (LEVAQUIN) tablet 750 mg, 750 mg, Oral, Q24H, Casey Gutierrez MD, 750 mg at 05/31/23 0621  •  methotrexate tablet 2.5 mg, 2.5 mg, Oral, Weekly, Daniel Kaur MD, 2.5 mg at 05/30/23 0807  •  methylPREDNISolone sodium succinate (SOLU-Medrol) injection 40 mg, 40 mg, Intravenous, Q8H, Rubens Sheets MD, 40 mg at 05/31/23 1000  •  montelukast (SINGULAIR) tablet 10 mg, 10 mg, Oral, Nightly, Daniel Kaur MD, 10 mg at 05/30/23  2003  •  nitroglycerin (NITROSTAT) SL tablet 0.4 mg, 0.4 mg, Sublingual, Q5 Min PRN, Daniel Kaur MD  •  ondansetron (ZOFRAN) injection 4 mg, 4 mg, Intravenous, Q6H PRN, Daniel Kaur MD  •  rosuvastatin (CRESTOR) tablet 10 mg, 10 mg, Oral, Daily, Daniel Kaur MD, 10 mg at 05/31/23 1001  •  sodium chloride 0.9 % flush 10 mL, 10 mL, Intravenous, Q12H, Daniel Kaur MD, 10 mL at 05/31/23 1001  •  sodium chloride 0.9 % flush 10 mL, 10 mL, Intravenous, PRN, Daniel Kaur MD  •  sodium chloride 0.9 % infusion 40 mL, 40 mL, Intravenous, PRN, Daniel Kaur MD  •  torsemide (DEMADEX) tablet 40 mg, 40 mg, Oral, Daily, Lesly Blackman MD, 40 mg at 05/31/23 1000  •  traZODone (DESYREL) tablet 100 mg, 100 mg, Oral, Daily, Daniel Kaur MD, 100 mg at 05/31/23 1001  Review of Systems:    Negative for fever chills, negative for nausea or vomiting    Objective     Vital Signs  Temp:  [97.2 °F (36.2 °C)-98.1 °F (36.7 °C)] 98 °F (36.7 °C)  Heart Rate:  [] 77  Resp:  [18-20] 20  BP: (134-161)/() 145/98  Body mass index is 30.05 kg/m².    Intake/Output Summary (Last 24 hours) at 5/31/2023 1109  Last data filed at 5/31/2023 0900  Gross per 24 hour   Intake 720 ml   Output 1100 ml   Net -380 ml     I/O this shift:  In: -   Out: 200 [Urine:200]     Physical Exam:   General: patient awake, alert and cooperative   Eyes: Normal lids and lashes, no scleral icterus   Neck: supple, normal ROM   Skin: warm and dry, not jaundiced   Pulm:   regular and unlabored   Abdomen: soft, nontender, nondistended    Psychiatric: Normal mood and behavior; memory intact     Results Review:     I reviewed the patient's new clinical results.    Results from last 7 days   Lab Units 05/31/23  0456 05/29/23  0708 05/28/23  0559   WBC 10*3/mm3 15.95* 21.09* 26.75*   HEMOGLOBIN g/dL 13.2 11.7* 12.3*   HEMATOCRIT % 38.7 33.0* 35.0*   PLATELETS 10*3/mm3 459* 322 310     Results from last 7 days   Lab Units 05/31/23  0456 05/30/23  0429 05/29/23  0708  05/28/23  0559   SODIUM mmol/L 136 133* 128* 128*   POTASSIUM mmol/L 5.0 4.9 4.5 4.7   CHLORIDE mmol/L 100 99 94* 95*   CO2 mmol/L 25.4 23.6 22.2 20.2*   BUN mg/dL 44* 43* 37* 33*   CREATININE mg/dL 1.24 1.47* 1.45* 1.80*   CALCIUM mg/dL 10.1 9.6 9.3 9.2   BILIRUBIN mg/dL  --  1.4* 2.4* 2.8*   ALK PHOS U/L  --  161* 143* 134*   ALT (SGPT) U/L  --  90* 88* 66*   AST (SGOT) U/L  --  166* 180* 130*   GLUCOSE mg/dL 140* 144* 136* 116*         No results found for: LIPASE    Radiology:  US Liver   Final Result   Hepatomegaly. There is no intra or extrahepatic biliary dilatation.       This report was finalized on 5/31/2023 5:59 AM by Dr. Jacqueline Adkins M.D.          XR Chest 1 View   Final Result   Interval worsening, with increased density of right   pulmonary opacification, small left basilar atelectasis or infiltrate.       This report was finalized on 5/29/2023 6:13 AM by Dr. Darwin House M.D.          XR Chest 1 View   Final Result          Assessment & Plan     Active Hospital Problems    Diagnosis    • **Healthcare-associated pneumonia    • A-fib    • MURIEL (acute kidney injury)    • Acute respiratory failure with hypoxia    • Chronic interstitial lung disease    • Primary hypertension    • Asthma      All problems are new to me today  Assessment:  1. Transaminitis    2.  Legionella pneumonia being treated with levofloxacin  3.  Asthma exacerbation  4.  Interstitial lung disease on immunosuppression and supplemental oxygen  5.  MURIEL on CKD  6.  Atrial fibrillation requiring long-term anticoagulation  7.  Personal history of colon polyps with adenomatous changes - recall 3 years      Plan:  · Ultrasound with hepatomegaly-Doppler pending.  · F/u am labs   · Continue to follow lab trend.  May be cholestasis related to sepsis.  Has been on antibiotics but timing does not fit as he had elevation in LFTs prior to starting Rocephin    I discussed the patients findings and my recommendations with  patient.    Patti Navarro MD

## 2023-05-31 NOTE — CASE MANAGEMENT/SOCIAL WORK
"Discharge Planning Assessment  [facility]     Patient Name: Orion Casiano  MRN: [id number]  Today's Date: 5/31/2023    Admit Date: 5/26/2023    Plan: Home w/ HH (pending), family to transport   Discharge Needs Assessment     Row Name 05/31/23 1456       Living Environment    People in Home alone    Current Living Arrangements home    Primary Care Provided by self    Family Caregiver if Needed child(rogelio), adult    Able to Return to Prior Arrangements yes       Resource/Environmental Concerns    Resource/Environmental Concerns none       Transition Planning    Patient/Family Anticipates Transition to home with help/services    Patient/Family Anticipated Services at Transition home health care    Transportation Anticipated family or friend will provide       Discharge Needs Assessment    Equipment Currently Used at Home oxygen    Concerns to be Addressed discharge planning    Discharge Facility/Level of Care Needs home with home health               Discharge Plan     Row Name 05/31/23 1458       Plan    Plan Home w/ HH (pending), family to transport    Patient/Family in Agreement with Plan yes    Plan Comments CCP spoke with patient at bedside; explained role, verified facesheet, and discussed dc plan. Patient uses 2L continuous oxygen from [employer]. He lives alone in a 3 level home with \"multiple\" steps to enter. He reports no trouble with ambulation throughout his home. He has no history of HH or SNF. Patient is requesting HH for skilled nursing/PT at [facility]. He has no preference on an agency. He denies any DME needs. Family to transport. CCP following for any further recommendations pending clinical course. LORETTA, EZRAW              Continued Care and Services - Admitted Since 5/26/2023     Home Medical Care     Service Provider Request Status Selected Services Address Phone Fax Patient Preferred    [facility] [facility] [address], [address], [facility] [address] [phone] " [phone] --              Expected Discharge Date and Time     Expected Discharge Date Expected Discharge Time    Jun 1, 2023          Demographic Summary     Row Name 05/31/23 1455       General Information    Admission Type inpatient    Arrived From home    Referral Source admission list    Reason for Consult discharge planning    Preferred Language English       Contact Information    Permission Granted to Share Info With family/designee               Functional Status     Row Name 05/31/23 1456       Functional Status    Usual Activity Tolerance good    Current Activity Tolerance good       Functional Status, IADL    Medications independent    Meal Preparation independent    Housekeeping independent    Laundry independent    Shopping independent       Mental Status    General Appearance WDL WDL               Psychosocial    No documentation.                Abuse/Neglect    No documentation.                Legal    No documentation.                Substance Abuse    No documentation.                Patient Forms    No documentation.                   ABBIE Dwyer

## 2023-05-31 NOTE — PROGRESS NOTES
"                                              LOS: 5 days   Patient Care Team:  Niecy Galvan MD as PCP - General (Family Medicine)  Patti Navarro MD as Consulting Physician (Gastroenterology)    Chief Complaint:  F/up pneumonia, respiratory failure and ILD.    Subjective   Interval History  Oxygen requirement fluctuating between 2 to 4 L nasal cannula oxygen.  Resting comfortably and overall feels better.    REVIEW OF SYSTEMS:     GASTROINTESTINAL: No nausea or vomiting.  Mild diarrhea.  CONSTITUTIONAL: No fever or chills.     Ventilator/Non-Invasive Ventilation Settings (From admission, onward)    None                Physical Exam:     Vital Signs  Temp:  [97.2 °F (36.2 °C)-98.5 °F (36.9 °C)] 98.5 °F (36.9 °C)  Heart Rate:  [] 84  Resp:  [18-20] 20  BP: (134-152)/(82-98) 144/96    Intake/Output Summary (Last 24 hours) at 5/31/2023 1422  Last data filed at 5/31/2023 0900  Gross per 24 hour   Intake 360 ml   Output 1100 ml   Net -740 ml     Flowsheet Rows    Flowsheet Row First Filed Value   Admission Height 186.7 cm (73.5\") Documented at 05/26/2023 1339   Admission Weight 103 kg (228 lb) Documented at 05/26/2023 1339          PPE used per hospital policy    General Appearance:   Alert, cooperative, in no acute distress   ENMT:  Mallampati score 3, moist mucous membrane   Eyes:  Pupils equal and reactive to light. EOMI   Neck:   Large. Trachea midline. No thyromegaly.   Lungs:    Diffuse crackles on the right.  No wheezing.  No use of accessory muscles.    Heart:   Regular rhythm and normal rate, normal S1 and S2, no         murmur   Skin:   No rash or ecchymosis   Abdomen:    Obese. Soft. No tenderness. No HSM.   Neuro/psych:  Conscious, alert, oriented x3. Strength 5/5 in upper and lower  ext.  Appropriate mood and affect   Extremities:  No cyanosis, clubbing or edema.  Warm extremities and well-perfused          Results Review:        Results from last 7 days   Lab Units 05/31/23  0456 " 05/30/23  0429 05/29/23  0708   SODIUM mmol/L 136 133* 128*   POTASSIUM mmol/L 5.0 4.9 4.5   CHLORIDE mmol/L 100 99 94*   CO2 mmol/L 25.4 23.6 22.2   BUN mg/dL 44* 43* 37*   CREATININE mg/dL 1.24 1.47* 1.45*   GLUCOSE mg/dL 140* 144* 136*   CALCIUM mg/dL 10.1 9.6 9.3         Results from last 7 days   Lab Units 05/31/23  0456 05/29/23  0708 05/28/23  0559   WBC 10*3/mm3 15.95* 21.09* 26.75*   HEMOGLOBIN g/dL 13.2 11.7* 12.3*   HEMATOCRIT % 38.7 33.0* 35.0*   PLATELETS 10*3/mm3 459* 322 310                           Results from last 7 days   Lab Units 05/26/23  1526   PH, ARTERIAL pH units 7.468*   PCO2, ARTERIAL mm Hg 33.7*   PO2 ART mm Hg 50.9*   FLOW RATE lpm 1   MODALITY  Cannula   O2 SATURATION CALC % 88.2*         I reviewed the patient's new clinical results.        Medication Review:   allopurinol, 100 mg, Oral, Daily  apixaban, 5 mg, Oral, BID  Azelastine HCl, 2 spray, Each Nare, BID  dilTIAZem CD, 180 mg, Oral, Q12H  ipratropium-albuterol, 3 mL, Nebulization, 4x Daily - RT  levoFLOXacin, 750 mg, Oral, Q24H  methotrexate, 2.5 mg, Oral, Weekly  methylPREDNISolone sodium succinate, 40 mg, Intravenous, Q8H  montelukast, 10 mg, Oral, Nightly  rosuvastatin, 10 mg, Oral, Daily  senna-docusate sodium, 2 tablet, Oral, BID  sodium chloride, 10 mL, Intravenous, Q12H  torsemide, 40 mg, Oral, Daily  traZODone, 100 mg, Oral, Daily            Assessment     1. Right pneumonia, Legionella  2. ILD, unspecified seems that he has mild fibrotic changes as well consistent with honeycombing but no typical UIP.  Increase in the GG infiltrates in the right lower lobe on latest CT chest 2/22/2023 compared to 7/8/2022.  S/p bronch with BAL and TBB 2/27, unrevealing with the exception of histiocytes and eosinophils on the BAL.  3. 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  Stability confirmed at least when compared to exam done 7/8/2022..  Suspect it is reactive secondary to  ILD.  S/p TB NA 2/27 -> histiocytes  4. Pulmonary hypertension, moderate. mPAP 34, PCWP 19- Transpulmonary P: 15.  Consistent with group 2 and group 3.  5. Asthma exacerbation  6. 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 Immunosuppression: On Imuran 150 mg and prednisone 10 mg daily     8. Atrial fibrillation  9. Shortness of breath, secondary to the above  10. Psoriasis    Plan     · Continue Levaquin.  Clinically and leukocytosis both are improving.  Duration of therapy 7 days due to severity of the pneumonia and underlying immunosuppression.  Chest x-ray reviewed noted dense pneumonia on the right side.  · Oxygen by NC and titrate keep SPO2 >90%.  We need to be more aggressive in weaning down oxygen.  · Continue weaning steroid as tolerated.  I will switch to oral steroids  · History of lymphadenopathy which has been stable.  This was felt to be secondary to ILD.  · DuoNeb 4 times a day  · VTE prophylaxis: Eliquis.  · Mobilize and ambulate        Casey Gutierrez MD  05/31/23  14:22 EDT          This note was dictated utilizing Dragon dictation

## 2023-06-01 LAB
ALBUMIN SERPL-MCNC: 3.1 G/DL (ref 3.5–5.2)
ALBUMIN/GLOB SERPL: 0.8 G/DL
ALP SERPL-CCNC: 147 U/L (ref 39–117)
ALT SERPL W P-5'-P-CCNC: 82 U/L (ref 1–41)
ANION GAP SERPL CALCULATED.3IONS-SCNC: 7 MMOL/L (ref 5–15)
AST SERPL-CCNC: 79 U/L (ref 1–40)
BILIRUB SERPL-MCNC: 1 MG/DL (ref 0–1.2)
BUN SERPL-MCNC: 47 MG/DL (ref 8–23)
BUN/CREAT SERPL: 35.1 (ref 7–25)
CALCIUM SPEC-SCNC: 9.3 MG/DL (ref 8.6–10.5)
CHLORIDE SERPL-SCNC: 97 MMOL/L (ref 98–107)
CO2 SERPL-SCNC: 32 MMOL/L (ref 22–29)
CREAT SERPL-MCNC: 1.34 MG/DL (ref 0.76–1.27)
EGFRCR SERPLBLD CKD-EPI 2021: 56.3 ML/MIN/1.73
GLOBULIN UR ELPH-MCNC: 3.8 GM/DL
GLUCOSE SERPL-MCNC: 144 MG/DL (ref 65–99)
POTASSIUM SERPL-SCNC: 4.7 MMOL/L (ref 3.5–5.2)
PROT SERPL-MCNC: 6.9 G/DL (ref 6–8.5)
SODIUM SERPL-SCNC: 136 MMOL/L (ref 136–145)

## 2023-06-01 PROCEDURE — 99232 SBSQ HOSP IP/OBS MODERATE 35: CPT | Performed by: INTERNAL MEDICINE

## 2023-06-01 PROCEDURE — 94760 N-INVAS EAR/PLS OXIMETRY 1: CPT

## 2023-06-01 PROCEDURE — 99231 SBSQ HOSP IP/OBS SF/LOW 25: CPT | Performed by: INTERNAL MEDICINE

## 2023-06-01 PROCEDURE — 94799 UNLISTED PULMONARY SVC/PX: CPT

## 2023-06-01 PROCEDURE — 94664 DEMO&/EVAL PT USE INHALER: CPT

## 2023-06-01 PROCEDURE — 94761 N-INVAS EAR/PLS OXIMETRY MLT: CPT

## 2023-06-01 PROCEDURE — 63710000001 PREDNISONE PER 1 MG: Performed by: INTERNAL MEDICINE

## 2023-06-01 PROCEDURE — 80053 COMPREHEN METABOLIC PANEL: CPT | Performed by: INTERNAL MEDICINE

## 2023-06-01 RX ORDER — TORSEMIDE 20 MG/1
20 TABLET ORAL DAILY
Status: DISCONTINUED | OUTPATIENT
Start: 2023-06-02 | End: 2023-06-02

## 2023-06-01 RX ADMIN — ALLOPURINOL 100 MG: 100 TABLET ORAL at 09:13

## 2023-06-01 RX ADMIN — IPRATROPIUM BROMIDE AND ALBUTEROL SULFATE 3 ML: 2.5; .5 SOLUTION RESPIRATORY (INHALATION) at 21:58

## 2023-06-01 RX ADMIN — AZELASTINE HYDROCHLORIDE 274 MCG: 137 SPRAY, METERED NASAL at 09:14

## 2023-06-01 RX ADMIN — TRAZODONE HYDROCHLORIDE 100 MG: 100 TABLET ORAL at 09:13

## 2023-06-01 RX ADMIN — IPRATROPIUM BROMIDE AND ALBUTEROL SULFATE 3 ML: 2.5; .5 SOLUTION RESPIRATORY (INHALATION) at 07:08

## 2023-06-01 RX ADMIN — DILTIAZEM HYDROCHLORIDE 180 MG: 180 CAPSULE, COATED, EXTENDED RELEASE ORAL at 20:51

## 2023-06-01 RX ADMIN — MONTELUKAST SODIUM 10 MG: 10 TABLET, FILM COATED ORAL at 20:51

## 2023-06-01 RX ADMIN — ROSUVASTATIN CALCIUM 10 MG: 10 TABLET, FILM COATED ORAL at 09:13

## 2023-06-01 RX ADMIN — AZELASTINE HYDROCHLORIDE 274 MCG: 137 SPRAY, METERED NASAL at 20:51

## 2023-06-01 RX ADMIN — LEVOFLOXACIN 750 MG: 750 TABLET, FILM COATED ORAL at 06:21

## 2023-06-01 RX ADMIN — PREDNISONE 20 MG: 20 TABLET ORAL at 09:13

## 2023-06-01 RX ADMIN — APIXABAN 5 MG: 5 TABLET, FILM COATED ORAL at 09:13

## 2023-06-01 RX ADMIN — Medication 10 ML: at 20:52

## 2023-06-01 RX ADMIN — TORSEMIDE 40 MG: 20 TABLET ORAL at 09:13

## 2023-06-01 RX ADMIN — DILTIAZEM HYDROCHLORIDE 180 MG: 180 CAPSULE, COATED, EXTENDED RELEASE ORAL at 09:13

## 2023-06-01 RX ADMIN — APIXABAN 5 MG: 5 TABLET, FILM COATED ORAL at 20:51

## 2023-06-01 RX ADMIN — Medication 10 ML: at 09:14

## 2023-06-01 NOTE — PROGRESS NOTES
[facility] Follow Up    Chief Complaint: Follow up atrial fibrillation    Interval History: He feels frustrated today.  Breathing may be a little bit better.  No chest pain.  Remains in atrial fibrillation.    Objective:     Objective:  Temp:  [97.2 °F (36.2 °C)-98.5 °F (36.9 °C)] 97.2 °F (36.2 °C)  Heart Rate:  [71-99] 80  Resp:  [18-25] 20  BP: (139-144)/(92-97) 139/92     Intake/Output Summary (Last 24 hours) at 6/1/2023 0727  Last data filed at 6/1/2023 0503  Gross per 24 hour   Intake 480 ml   Output 2320 ml   Net -1840 ml     Body mass index is 30.05 kg/m².      05/26/23  1339 05/26/23  2146 05/27/23  0520   Weight: 103 kg (228 lb) 104 kg (228 lb 14.4 oz) 105 kg (230 lb 14.4 oz)     Weight change:       Physical Exam:   General : Alert, cooperative, in no acute distress.  Neuro: Alert,cooperative and oriented.  Lungs: CTAB. Normal respiratory effort and rate.  CV: Irregularly, irregular, normal S1 and S2, no murmurs, gallops or rubs.  ABD: Soft, nontender, nondistended. Positive bowel sounds.  Extr: No edema or cyanosis, moves all extremities.    Lab Review:   Results from last 7 days   Lab Units 05/31/23  0456 05/30/23  0429   SODIUM mmol/L 136 133*   POTASSIUM mmol/L 5.0 4.9   CHLORIDE mmol/L 100 99   CO2 mmol/L 25.4 23.6   BUN mg/dL 44* 43*   CREATININE mg/dL 1.24 1.47*   GLUCOSE mg/dL 140* 144*   CALCIUM mg/dL 10.1 9.6   AST (SGOT) U/L 119* 166*   ALT (SGPT) U/L 94* 90*         Results from last 7 days   Lab Units 05/31/23  0456 05/29/23  0708   WBC 10*3/mm3 15.95* 21.09*   HEMOGLOBIN g/dL 13.2 11.7*   HEMATOCRIT % 38.7 33.0*   PLATELETS 10*3/mm3 459* 322                   Invalid input(s): LDLCALC      Results from last 7 days   Lab Units 05/30/23  0429   TSH uIU/mL 0.257*     I reviewed the patient's new clinical results.  I personally viewed and interpreted the patient's EKG  Current Medications:   Scheduled Meds:allopurinol, 100 mg, Oral, Daily  apixaban, 5 mg, Oral,  BID  Azelastine HCl, 2 spray, Each Nare, BID  dilTIAZem CD, 180 mg, Oral, Q12H  ipratropium-albuterol, 3 mL, Nebulization, 4x Daily - RT  levoFLOXacin, 750 mg, Oral, Q24H  methotrexate, 2.5 mg, Oral, Weekly  montelukast, 10 mg, Oral, Nightly  predniSONE, 20 mg, Oral, BID With Meals  rosuvastatin, 10 mg, Oral, Daily  senna-docusate sodium, 2 tablet, Oral, BID  sodium chloride, 10 mL, Intravenous, Q12H  torsemide, 40 mg, Oral, Daily  traZODone, 100 mg, Oral, Daily      Continuous Infusions:     Allergies:  Allergies   Allergen Reactions   • Ibuprofen Shortness Of Breath   • Aspirin        Assessment/Plan:      1.  Legionella pneumonia.    On antibiotics.  2.  Asthma with exacerbation.  3.  Acute on chronic hypoxic respiratory failure.  Remains on 5 L nasal cannula.  4.  Atrial fibrillation.  Remains persistent.  Rates better controlled with diltiazem.  On anticoagulation with apixaban.  5.  Hypertension.    Improved following the increase in the diltiazem dosage.  6.  Acute on chronic CKD.  Improved.    -Continue current dose of diltiazem.  - Continue apixaban.      Alejandra Terrell MD  06/01/23  07:27 EDT

## 2023-06-01 NOTE — PROGRESS NOTES
"                                              LOS: 6 days   Patient Care Team:  Niecy Galvan MD as PCP - General (Family Medicine)  Patti Navarro MD as Consulting Physician (Gastroenterology)    Chief Complaint:  F/up pneumonia, respiratory failure and ILD.    Subjective   Interval History  He is back up to 5 L nasal cannula.  Still continues to feel better.    REVIEW OF SYSTEMS:     GASTROINTESTINAL: No nausea or vomiting.  Mild diarrhea.  CONSTITUTIONAL: No fever or chills.     Ventilator/Non-Invasive Ventilation Settings (From admission, onward)    None                Physical Exam:     Vital Signs  Temp:  [97.2 °F (36.2 °C)] 97.2 °F (36.2 °C)  Heart Rate:  [80-99] 80  Resp:  [18-25] 20  BP: (139-141)/(92-97) 139/92    Intake/Output Summary (Last 24 hours) at 6/1/2023 1211  Last data filed at 6/1/2023 1100  Gross per 24 hour   Intake 1000 ml   Output 2120 ml   Net -1120 ml     Flowsheet Rows    Flowsheet Row First Filed Value   Admission Height 186.7 cm (73.5\") Documented at 05/26/2023 1339   Admission Weight 103 kg (228 lb) Documented at 05/26/2023 1339          PPE used per hospital policy    General Appearance:   Alert, cooperative, in no acute distress   ENMT:  Mallampati score 3, moist mucous membrane   Eyes:  Pupils equal and reactive to light. EOMI   Neck:   Large. Trachea midline. No thyromegaly.   Lungs:    Diffuse crackles on the right.  No wheezing.  No use of accessory muscles.    Heart:   Regular rhythm and normal rate, normal S1 and S2, no         murmur   Skin:   No rash or ecchymosis   Abdomen:    Obese. Soft. No tenderness. No HSM.   Neuro/psych:  Conscious, alert, oriented x3. Strength 5/5 in upper and lower  ext.  Appropriate mood and affect   Extremities:  No cyanosis, clubbing or edema.  Warm extremities and well-perfused          Results Review:        Results from last 7 days   Lab Units 06/01/23  0848 05/31/23  0456 05/30/23  0429   SODIUM mmol/L 136 136 133*   POTASSIUM " mmol/L 4.7 5.0 4.9   CHLORIDE mmol/L 97* 100 99   CO2 mmol/L 32.0* 25.4 23.6   BUN mg/dL 47* 44* 43*   CREATININE mg/dL 1.34* 1.24 1.47*   GLUCOSE mg/dL 144* 140* 144*   CALCIUM mg/dL 9.3 10.1 9.6         Results from last 7 days   Lab Units 05/31/23  0456 05/29/23  0708 05/28/23  0559   WBC 10*3/mm3 15.95* 21.09* 26.75*   HEMOGLOBIN g/dL 13.2 11.7* 12.3*   HEMATOCRIT % 38.7 33.0* 35.0*   PLATELETS 10*3/mm3 459* 322 310                           Results from last 7 days   Lab Units 05/26/23  1526   PH, ARTERIAL pH units 7.468*   PCO2, ARTERIAL mm Hg 33.7*   PO2 ART mm Hg 50.9*   FLOW RATE lpm 1   MODALITY  Cannula   O2 SATURATION CALC % 88.2*         I reviewed the patient's new clinical results.        Medication Review:   allopurinol, 100 mg, Oral, Daily  apixaban, 5 mg, Oral, BID  Azelastine HCl, 2 spray, Each Nare, BID  dilTIAZem CD, 180 mg, Oral, Q12H  ipratropium-albuterol, 3 mL, Nebulization, 4x Daily - RT  levoFLOXacin, 750 mg, Oral, Q24H  methotrexate, 2.5 mg, Oral, Weekly  montelukast, 10 mg, Oral, Nightly  predniSONE, 20 mg, Oral, BID With Meals  rosuvastatin, 10 mg, Oral, Daily  senna-docusate sodium, 2 tablet, Oral, BID  sodium chloride, 10 mL, Intravenous, Q12H  [START ON 6/2/2023] torsemide, 20 mg, Oral, Daily  traZODone, 100 mg, Oral, Daily            Assessment     1. Right pneumonia, Legionella  2. ILD, unspecified seems that he has mild fibrotic changes as well consistent with honeycombing but no typical UIP.  Increase in the GG infiltrates in the right lower lobe on latest CT chest 2/22/2023 compared to 7/8/2022.  S/p bronch with BAL and TBB 2/27, unrevealing with the exception of histiocytes and eosinophils on the BAL.  3. 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  Stability confirmed at least when compared to exam done 7/8/2022..  Suspect it is reactive secondary to ILD.  S/p TB NA 2/27 -> histiocytes  4. Pulmonary hypertension, moderate.  mPAP 34, PCWP 19- Transpulmonary P: 15.  Consistent with group 2 and group 3.  5. Asthma exacerbation  6. 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 Immunosuppression: On Imuran 150 mg and prednisone 10 mg daily     8. Atrial fibrillation  9. Shortness of breath, secondary to the above  10. Psoriasis    Plan     · Complete Levaquin for 7 days..  Clinically and leukocytosis both are improving.   immunosuppression.  Chest x-ray reviewed noted dense pneumonia on the right side.  · Oxygen requirement has been fluctuating still requiring 5 L.  This is likely due to dense pneumonia on the right side.  I have encouraged patient to ambulate and use incentive spirometry.  · I will continue weaning down steroids as tolerated  · History of lymphadenopathy which has been stable.  This was felt to be secondary to ILD.  · DuoNeb 4 times a day  · VTE prophylaxis: Eliquis.  · Mobilize and ambulate        Casey Gutierrez MD  06/01/23  12:11 EDT          This note was dictated utilizing [employer] dictation

## 2023-06-01 NOTE — PROGRESS NOTES
Name: Orion Casiano ADMIT: 2023   : 1950  PCP: Niecy Galvan MD    MRN: [id number] LOS: 6 days   AGE/SEX: 72 y.o. male  ROOM: [facility]     Subjective   Subjective   Feeling better today overall.  Breathing is a bit improved.    Objective   Objective   Vital Signs  Temp:  [97.2 °F (36.2 °C)] 97.2 °F (36.2 °C)  Heart Rate:  [80-99] 80  Resp:  [18-25] 20  BP: (139-141)/(92-97) 139/92  SpO2:  [90 %-95 %] 94 %  on  Flow (L/min):  [5] 5;   Device (Oxygen Therapy): nasal cannula  Body mass index is 30.05 kg/m².  Physical Exam  Constitutional:       General: He is not in acute distress.     Appearance: Normal appearance. He is ill-appearing and toxic-appearing.   Cardiovascular:      Rate and Rhythm: Normal rate and regular rhythm.   Pulmonary:      Effort: Pulmonary effort is normal.      Breath sounds: Rales present. No wheezing or rhonchi.   Chest:      Chest wall: No tenderness.   Abdominal:      General: Abdomen is flat. There is no distension.      Tenderness: There is no abdominal tenderness.   Skin:     Comments: Xerosis   Neurological:      General: No focal deficit present.      Mental Status: He is alert and oriented to person, place, and time.         Results Review     I reviewed the patient's new clinical results.  Results from last 7 days   Lab Units 23  0456 23  0708 23  0559 23  0627   WBC 10*3/mm3 15.95* 21.09* 26.75* 19.18*   HEMOGLOBIN g/dL 13.2 11.7* 12.3* 13.4   PLATELETS 10*3/mm3 459* 322 310 270     Results from last 7 days   Lab Units 23  0848 23  0456 23  0429 23  0708   SODIUM mmol/L 136 136 133* 128*   POTASSIUM mmol/L 4.7 5.0 4.9 4.5   CHLORIDE mmol/L 97* 100 99 94*   CO2 mmol/L 32.0* 25.4 23.6 22.2   BUN mg/dL 47* 44* 43* 37*   CREATININE mg/dL 1.34* 1.24 1.47* 1.45*   GLUCOSE mg/dL 144* 140* 144* 136*   EGFR mL/min/1.73 56.3* 61.8 50.4* 51.2*     Results from last 7 days   Lab Units 23  0848 23  0450  05/30/23  0429 05/29/23  0708   ALBUMIN g/dL 3.1* 3.1* 2.9* 2.7*   BILIRUBIN mg/dL 1.0 1.2 1.4* 2.4*   ALK PHOS U/L 147* 155* 161* 143*   AST (SGOT) U/L 79* 119* 166* 180*   ALT (SGPT) U/L 82* 94* 90* 88*     Results from last 7 days   Lab Units 06/01/23  0848 05/31/23  0456 05/30/23  0429 05/29/23  0708   CALCIUM mg/dL 9.3 10.1 9.6 9.3   ALBUMIN g/dL 3.1* 3.1* 2.9* 2.7*     Results from last 7 days   Lab Units 05/26/23  1727 05/26/23  1429   PROCALCITONIN ng/mL  --  9.19*   LACTATE mmol/L 2.0 2.8*     No results found for: HGBA1C, POCGLU    US Liver    Result Date: 5/31/2023  Hepatomegaly. There is no intra or extrahepatic biliary dilatation.  This report was finalized on 5/31/2023 5:59 AM by Dr. Jacqueline Adkins M.D.      Scheduled Medications  allopurinol, 100 mg, Oral, Daily  apixaban, 5 mg, Oral, BID  Azelastine HCl, 2 spray, Each Nare, BID  dilTIAZem CD, 180 mg, Oral, Q12H  ipratropium-albuterol, 3 mL, Nebulization, 4x Daily - RT  levoFLOXacin, 750 mg, Oral, Q24H  methotrexate, 2.5 mg, Oral, Weekly  montelukast, 10 mg, Oral, Nightly  [START ON 6/2/2023] predniSONE, 30 mg, Oral, Daily  rosuvastatin, 10 mg, Oral, Daily  senna-docusate sodium, 2 tablet, Oral, BID  sodium chloride, 10 mL, Intravenous, Q12H  [START ON 6/2/2023] torsemide, 20 mg, Oral, Daily  traZODone, 100 mg, Oral, Daily    Infusions   Diet  Diet: Renal Diets; Low Sodium (2-3g), Low Potassium, Low Phosphorus; Texture: Regular Texture (IDDSI 7); Fluid Consistency: Thin (IDDSI 0)       Assessment/Plan     Active Hospital Problems    Diagnosis  POA   • **Healthcare-associated pneumonia [J18.9]  Yes   • A-fib [I48.91]  Unknown   • MURIEL (acute kidney injury) [N17.9]  Unknown   • Acute respiratory failure with hypoxia [J96.01]  Yes   • Chronic interstitial lung disease [J84.9]  Yes   • Primary hypertension [I10]  Yes   • Asthma [J45.909]  Yes      Resolved Hospital Problems   No resolved problems to display.       72 y.o. male admitted with  Healthcare-associated pneumonia.      06/01/23  Continue antibiotics.  Wean O2.  Hopeful DC 1 to 2 days.    Acute on chronic hypoxic respiratory failure  Legionella pneumonia  ILD (2L home O2)   -still requiring 4-5L NC  -DuoNebs, prednisone  -Pulmonology following  -Plan for Levaquin 7 days    MURIEL  -Crt 1.83 OA (b/l ~0.9) > 1.3 (6/1)  -Nephrology following  -Torsemide 20 mg daily    A-fib  -RC: Diltiazem 180 mg twice daily  - AC: Apixaban    Gout  -Allopurinol 100 mg    Transaminitis  -GI following  -US w/ hepatomegaly  -duplex WNL    · Eliquis (home med) for DVT prophylaxis.  · Discussed with patient and care team on multidisciplinary rounds.  · Anticipate discharge home when cleared by consultants      Jerod Ram MD  [facility]  06/01/23  13:36 EDT

## 2023-06-01 NOTE — PROGRESS NOTES
[facility] Progress Note      Patient Name: Orion Casiano  : 1950  MRN: [id number]  Primary Care Physician:  Niecy Galvan MD  Date of admission: 2023    Subjective     Interval History:     Seen and examined on 5 L nasal cannula.  Feels better.  No new issues since yesterday    Review of Systems:   As noted above    Objective     Vitals:   Temp:  [97.2 °F (36.2 °C)-98.5 °F (36.9 °C)] 97.2 °F (36.2 °C)  Heart Rate:  [71-99] 80  Resp:  [18-25] 20  BP: (139-144)/(92-97) 139/92  Flow (L/min):  [4-5] 5    Intake/Output Summary (Last 24 hours) at 2023 1011  Last data filed at 2023 0800  Gross per 24 hour   Intake 600 ml   Output 2120 ml   Net -1520 ml       Physical Exam:    General Appearance: alert, oriented x 3, no acute distress   Skin: warm and dry  HEENT: oral mucosa normal, nonicteric sclera  Neck: supple, no JVD  Lungs: CTA  Heart: RRR, normal S1 and S2  Abdomen: soft, nontender, nondistended  : no palpable bladder  Extremities: no edema, cyanosis or clubbing  Neuro: normal speech and mental status     Scheduled Meds:     allopurinol, 100 mg, Oral, Daily  apixaban, 5 mg, Oral, BID  Azelastine HCl, 2 spray, Each Nare, BID  dilTIAZem CD, 180 mg, Oral, Q12H  ipratropium-albuterol, 3 mL, Nebulization, 4x Daily - RT  levoFLOXacin, 750 mg, Oral, Q24H  methotrexate, 2.5 mg, Oral, Weekly  montelukast, 10 mg, Oral, Nightly  predniSONE, 20 mg, Oral, BID With Meals  rosuvastatin, 10 mg, Oral, Daily  senna-docusate sodium, 2 tablet, Oral, BID  sodium chloride, 10 mL, Intravenous, Q12H  torsemide, 40 mg, Oral, Daily  traZODone, 100 mg, Oral, Daily      IV Meds:        Results Reviewed:   I have personally reviewed the results from the time of this admission to 2023 10:11 EDT     Results from last 7 days   Lab Units 23  0848 23  0456 23  0429   SODIUM mmol/L 136 136 133*   POTASSIUM mmol/L 4.7 5.0 4.9   CHLORIDE mmol/L 97* 100 99   CO2  mmol/L 32.0* 25.4 23.6   BUN mg/dL 47* 44* 43*   CREATININE mg/dL 1.34* 1.24 1.47*   CALCIUM mg/dL 9.3 10.1 9.6   BILIRUBIN mg/dL 1.0 1.2 1.4*   ALK PHOS U/L 147* 155* 161*   ALT (SGPT) U/L 82* 94* 90*   AST (SGOT) U/L 79* 119* 166*   GLUCOSE mg/dL 144* 140* 144*     Estimated Creatinine Clearance: 63.9 mL/min (A) (by C-G formula based on SCr of 1.34 mg/dL (H)).      Results from last 7 days   Lab Units 05/29/23  0708   URIC ACID mg/dL 7.6*     Results from last 7 days   Lab Units 05/31/23  0456 05/29/23  0708 05/28/23  0559 05/27/23  0627 05/26/23  1429   WBC 10*3/mm3 15.95* 21.09* 26.75* 19.18* 22.52*   HEMOGLOBIN g/dL 13.2 11.7* 12.3* 13.4 13.7   PLATELETS 10*3/mm3 459* 322 310 270 266           Assessment / Plan     ASSESSMENT:  1.  Acute kidney injury: Creatinine is up at 1.8 mg/dL at time of admission from baseline of 0.9 however it has been improving with latest creatinine 1.4 mg/dL that is likely due to sepsis and decreased renal perfusion in setting of renal autoregulation impairment due to lisinopril.      2.  Hyponatremia: Improving with last sodium 133  3.  Interstitial lung disease in the right Legionella pneumonia management per pulmonary.  Patient currently on steroid and Levaquin  4.  History of pulm hypertension with last pulmonary arterial pressures 34 and PCWP at 19.  5.  Immunosuppression currently on Imuran and prednisone  6.  History of atrial fibrillation        PLAN:     1.  Will change torsemide to 20 mg p.o. daily and lab showed increase of bicarb to 32 with BUN at 47  2.  Surveillance labs    Thank you for involving us in the care of Orion Casiano.  Please feel free to call with any questions.    Lesly Blackman MD  06/01/23  10:11 EDT    [facility]  [phone]

## 2023-06-01 NOTE — PROGRESS NOTES
[facility]  Inpatient Progress Note    Reason for Follow Up:  Transaminitis    Subjective     Interval History:   He has no GI complaints.  No chest pain or abdominal pain.  No nausea or vomiting.  The ultrasound of the liver shows hepatomegaly but a normal gallbladder.  His liver function tests are decreasing.  The duplex ultrasound of his hepatic vessels is okay but was not a great study.  Some cough.    Current Facility-Administered Medications:   •  acetaminophen (TYLENOL) tablet 650 mg, 650 mg, Oral, Q4H PRN **OR** acetaminophen (TYLENOL) 160 MG/5ML solution 650 mg, 650 mg, Oral, Q4H PRN **OR** acetaminophen (TYLENOL) suppository 650 mg, 650 mg, Rectal, Q4H PRN, Daniel Kaur MD  •  albuterol (PROVENTIL) nebulizer solution 0.083% 2.5 mg/3mL, 2.5 mg, Nebulization, Q6H PRN, Daniel Kaur MD  •  allopurinol (ZYLOPRIM) tablet 100 mg, 100 mg, Oral, Daily, Daniel Kaur MD, 100 mg at 06/01/23 0913  •  apixaban (ELIQUIS) tablet 5 mg, 5 mg, Oral, BID, Daniel Kaur MD, 5 mg at 06/01/23 0913  •  Azelastine HCl solution 274 mcg, 2 spray, Each Nare, BID, Daniel Kaur MD, 274 mcg at 06/01/23 0914  •  sennosides-docusate (PERICOLACE) 8.6-50 MG per tablet 2 tablet, 2 tablet, Oral, BID, 2 tablet at 05/30/23 2003 **AND** polyethylene glycol (MIRALAX) packet 17 g, 17 g, Oral, Daily PRN **AND** bisacodyl (DULCOLAX) EC tablet 5 mg, 5 mg, Oral, Daily PRN **AND** bisacodyl (DULCOLAX) suppository 10 mg, 10 mg, Rectal, Daily PRN, Daniel Kaur MD  •  dilTIAZem CD (CARDIZEM CD) 24 hr capsule 180 mg, 180 mg, Oral, Q12H, Alejandra Terrell MD, 180 mg at 06/01/23 0913  •  ipratropium-albuterol (DUO-NEB) nebulizer solution 3 mL, 3 mL, Nebulization, 4x Daily - RT, Daniel Kaur MD, 3 mL at 06/01/23 0708  •  levoFLOXacin (LEVAQUIN) tablet 750 mg, 750 mg, Oral, Q24H, Casey Gutierrez MD, 750 mg at 06/01/23 0621  •  methotrexate tablet 2.5 mg, 2.5 mg, Oral, Weekly, Daniel Kaur MD, 2.5 mg at 05/30/23 0807  •   montelukast (SINGULAIR) tablet 10 mg, 10 mg, Oral, Nightly, Daniel Kaur MD, 10 mg at 05/31/23 2011  •  nitroglycerin (NITROSTAT) SL tablet 0.4 mg, 0.4 mg, Sublingual, Q5 Min PRN, Daniel Kaur MD  •  ondansetron (ZOFRAN) injection 4 mg, 4 mg, Intravenous, Q6H PRN, Daniel Kaur MD  •  [START ON 6/2/2023] predniSONE (DELTASONE) tablet 30 mg, 30 mg, Oral, Daily, Casey Gutierrez MD  •  rosuvastatin (CRESTOR) tablet 10 mg, 10 mg, Oral, Daily, Daniel Kaur MD, 10 mg at 06/01/23 0913  •  sodium chloride 0.9 % flush 10 mL, 10 mL, Intravenous, Q12H, Daniel Kaur MD, 10 mL at 06/01/23 0914  •  sodium chloride 0.9 % flush 10 mL, 10 mL, Intravenous, PRN, Daniel Kaur MD  •  sodium chloride 0.9 % infusion 40 mL, 40 mL, Intravenous, PRN, Daniel Kaur MD  •  [START ON 6/2/2023] torsemide (DEMADEX) tablet 20 mg, 20 mg, Oral, Daily, Lesly Blackman MD  •  traZODone (DESYREL) tablet 100 mg, 100 mg, Oral, Daily, Daniel Kaur MD, 100 mg at 06/01/23 0913  Review of Systems:    The following systems were reviewed and negative;  constitution, cardiovascular, musculoskeletal and neurological    Objective     Vital Signs  Temp:  [97.2 °F (36.2 °C)] 97.2 °F (36.2 °C)  Heart Rate:  [80-99] 80  Resp:  [18-25] 20  BP: (139-141)/(92-97) 139/92  Body mass index is 30.05 kg/m².    Intake/Output Summary (Last 24 hours) at 6/1/2023 1336  Last data filed at 6/1/2023 1200  Gross per 24 hour   Intake 1400 ml   Output 2120 ml   Net -720 ml     I/O this shift:  In: 920 [P.O.:920]  Out: -      Physical Exam:   General: patient awake, alert and cooperative   Eyes: Normal lids and lashes, no scleral icterus   Neck: supple, normal ROM   Skin: warm and dry, not jaundiced   Cardiovascular: regular rhythm and rate, no murmurs auscultated   Pulm: clear to auscultation bilaterally, regular and unlabored   Abdomen: soft, nontender, nondistended; normal bowel sounds   Extremities: no rash or edema   Psychiatric: Normal mood and behavior; memory  intact     Results Review:     I reviewed the patient's new clinical results.    Results from last 7 days   Lab Units 05/31/23  0456 05/29/23  0708 05/28/23  0559   WBC 10*3/mm3 15.95* 21.09* 26.75*   HEMOGLOBIN g/dL 13.2 11.7* 12.3*   HEMATOCRIT % 38.7 33.0* 35.0*   PLATELETS 10*3/mm3 459* 322 310     Results from last 7 days   Lab Units 06/01/23  0848 05/31/23  0456 05/30/23  0429   SODIUM mmol/L 136 136 133*   POTASSIUM mmol/L 4.7 5.0 4.9   CHLORIDE mmol/L 97* 100 99   CO2 mmol/L 32.0* 25.4 23.6   BUN mg/dL 47* 44* 43*   CREATININE mg/dL 1.34* 1.24 1.47*   CALCIUM mg/dL 9.3 10.1 9.6   BILIRUBIN mg/dL 1.0 1.2 1.4*   ALK PHOS U/L 147* 155* 161*   ALT (SGPT) U/L 82* 94* 90*   AST (SGOT) U/L 79* 119* 166*   GLUCOSE mg/dL 144* 140* 144*         No results found for: LIPASE    Radiology:  US Liver   Final Result   Hepatomegaly. There is no intra or extrahepatic biliary dilatation.       This report was finalized on 5/31/2023 5:59 AM by Dr. Jacqueline Adkins M.D.          XR Chest 1 View   Final Result   Interval worsening, with increased density of right   pulmonary opacification, small left basilar atelectasis or infiltrate.       This report was finalized on 5/29/2023 6:13 AM by Dr. Darwin House M.D.          XR Chest 1 View   Final Result          Assessment & Plan     Active Hospital Problems    Diagnosis    • **Healthcare-associated pneumonia    • A-fib    • MURIEL (acute kidney injury)    • Acute respiratory failure with hypoxia    • Chronic interstitial lung disease    • Primary hypertension    • Asthma        Assessment/Recommendations:    All problems are new to me today  Assessment:  1. Transaminitis    2.  Legionella pneumonia being treated with levofloxacin  3.  Asthma exacerbation  4.  Interstitial lung disease on immunosuppression and supplemental oxygen  5.  MURIEL on CKD  6.  Atrial fibrillation requiring long-term anticoagulation  7.  Personal history of colon polyps with adenomatous changes  - recall 3 years        Plan:    • Continue to follow lab trend.  May be cholestasis related to sepsis.      I discussed the patients findings and my recommendations with patient.    Apolinar Matthew MD

## 2023-06-02 ENCOUNTER — APPOINTMENT (OUTPATIENT)
Dept: GENERAL RADIOLOGY | Facility: HOSPITAL | Age: 73
End: 2023-06-02
Payer: MEDICARE

## 2023-06-02 ENCOUNTER — APPOINTMENT (OUTPATIENT)
Dept: CT IMAGING | Facility: HOSPITAL | Age: 73
End: 2023-06-02
Payer: MEDICARE

## 2023-06-02 LAB
ALBUMIN SERPL-MCNC: 2.8 G/DL (ref 3.5–5.2)
ALBUMIN/GLOB SERPL: 0.8 G/DL
ALP SERPL-CCNC: 124 U/L (ref 39–117)
ALT SERPL W P-5'-P-CCNC: 69 U/L (ref 1–41)
ANION GAP SERPL CALCULATED.3IONS-SCNC: 5.4 MMOL/L (ref 5–15)
ARTERIAL PATENCY WRIST A: POSITIVE
AST SERPL-CCNC: 55 U/L (ref 1–40)
ATMOSPHERIC PRESS: 745.5 MMHG
BASE EXCESS BLDA CALC-SCNC: 5.6 MMOL/L (ref 0–2)
BDY SITE: ABNORMAL
BILIRUB SERPL-MCNC: 0.9 MG/DL (ref 0–1.2)
BUN SERPL-MCNC: 50 MG/DL (ref 8–23)
BUN/CREAT SERPL: 39.7 (ref 7–25)
CALCIUM SPEC-SCNC: 9.6 MG/DL (ref 8.6–10.5)
CHLORIDE SERPL-SCNC: 99 MMOL/L (ref 98–107)
CO2 SERPL-SCNC: 34.6 MMOL/L (ref 22–29)
CREAT SERPL-MCNC: 1.26 MG/DL (ref 0.76–1.27)
EGFRCR SERPLBLD CKD-EPI 2021: 60.6 ML/MIN/1.73
GAS FLOW AIRWAY: 7 LPM
GLOBULIN UR ELPH-MCNC: 3.6 GM/DL
GLUCOSE SERPL-MCNC: 88 MG/DL (ref 65–99)
HCO3 BLDA-SCNC: 34.1 MMOL/L (ref 22–28)
MODALITY: ABNORMAL
PCO2 BLDA: 63.1 MM HG (ref 35–45)
PH BLDA: 7.34 PH UNITS (ref 7.35–7.45)
PO2 BLDA: 91.1 MM HG (ref 80–100)
POTASSIUM SERPL-SCNC: 5 MMOL/L (ref 3.5–5.2)
PROT SERPL-MCNC: 6.4 G/DL (ref 6–8.5)
SAO2 % BLDCOA: 96.1 % (ref 92–99)
SODIUM SERPL-SCNC: 139 MMOL/L (ref 136–145)
TOTAL RATE: 22 BREATHS/MINUTE

## 2023-06-02 PROCEDURE — 80053 COMPREHEN METABOLIC PANEL: CPT | Performed by: INTERNAL MEDICINE

## 2023-06-02 PROCEDURE — 36600 WITHDRAWAL OF ARTERIAL BLOOD: CPT

## 2023-06-02 PROCEDURE — 82803 BLOOD GASES ANY COMBINATION: CPT

## 2023-06-02 PROCEDURE — 94664 DEMO&/EVAL PT USE INHALER: CPT

## 2023-06-02 PROCEDURE — 99232 SBSQ HOSP IP/OBS MODERATE 35: CPT | Performed by: INTERNAL MEDICINE

## 2023-06-02 PROCEDURE — 71045 X-RAY EXAM CHEST 1 VIEW: CPT

## 2023-06-02 PROCEDURE — 94760 N-INVAS EAR/PLS OXIMETRY 1: CPT

## 2023-06-02 PROCEDURE — 63710000001 PREDNISONE PER 5 MG: Performed by: INTERNAL MEDICINE

## 2023-06-02 PROCEDURE — 94799 UNLISTED PULMONARY SVC/PX: CPT

## 2023-06-02 PROCEDURE — 63710000001 PREDNISONE PER 1 MG: Performed by: INTERNAL MEDICINE

## 2023-06-02 PROCEDURE — 94761 N-INVAS EAR/PLS OXIMETRY MLT: CPT

## 2023-06-02 PROCEDURE — 71250 CT THORAX DX C-: CPT

## 2023-06-02 RX ORDER — BUMETANIDE 0.25 MG/ML
2 INJECTION INTRAMUSCULAR; INTRAVENOUS EVERY 12 HOURS
Status: DISCONTINUED | OUTPATIENT
Start: 2023-06-02 | End: 2023-06-03

## 2023-06-02 RX ADMIN — ALLOPURINOL 100 MG: 100 TABLET ORAL at 08:59

## 2023-06-02 RX ADMIN — PREDNISONE 30 MG: 10 TABLET ORAL at 09:00

## 2023-06-02 RX ADMIN — MONTELUKAST SODIUM 10 MG: 10 TABLET, FILM COATED ORAL at 20:42

## 2023-06-02 RX ADMIN — IPRATROPIUM BROMIDE AND ALBUTEROL SULFATE 3 ML: 2.5; .5 SOLUTION RESPIRATORY (INHALATION) at 06:52

## 2023-06-02 RX ADMIN — ROSUVASTATIN CALCIUM 10 MG: 10 TABLET, FILM COATED ORAL at 08:59

## 2023-06-02 RX ADMIN — IPRATROPIUM BROMIDE AND ALBUTEROL SULFATE 3 ML: 2.5; .5 SOLUTION RESPIRATORY (INHALATION) at 20:21

## 2023-06-02 RX ADMIN — TRAZODONE HYDROCHLORIDE 100 MG: 100 TABLET ORAL at 09:00

## 2023-06-02 RX ADMIN — IPRATROPIUM BROMIDE AND ALBUTEROL SULFATE 3 ML: 2.5; .5 SOLUTION RESPIRATORY (INHALATION) at 14:46

## 2023-06-02 RX ADMIN — AZELASTINE HYDROCHLORIDE 274 MCG: 137 SPRAY, METERED NASAL at 09:01

## 2023-06-02 RX ADMIN — DILTIAZEM HYDROCHLORIDE 180 MG: 180 CAPSULE, COATED, EXTENDED RELEASE ORAL at 20:43

## 2023-06-02 RX ADMIN — APIXABAN 5 MG: 5 TABLET, FILM COATED ORAL at 08:59

## 2023-06-02 RX ADMIN — IPRATROPIUM BROMIDE AND ALBUTEROL SULFATE 3 ML: 2.5; .5 SOLUTION RESPIRATORY (INHALATION) at 10:08

## 2023-06-02 RX ADMIN — TORSEMIDE 20 MG: 20 TABLET ORAL at 08:59

## 2023-06-02 RX ADMIN — APIXABAN 5 MG: 5 TABLET, FILM COATED ORAL at 20:43

## 2023-06-02 RX ADMIN — AZELASTINE HYDROCHLORIDE 274 MCG: 137 SPRAY, METERED NASAL at 20:44

## 2023-06-02 RX ADMIN — BUMETANIDE 2 MG: 0.25 INJECTION INTRAMUSCULAR; INTRAVENOUS at 16:06

## 2023-06-02 RX ADMIN — DOCUSATE SODIUM 50MG AND SENNOSIDES 8.6MG 2 TABLET: 8.6; 5 TABLET, FILM COATED ORAL at 09:00

## 2023-06-02 RX ADMIN — Medication 10 ML: at 20:43

## 2023-06-02 RX ADMIN — LEVOFLOXACIN 750 MG: 750 TABLET, FILM COATED ORAL at 06:46

## 2023-06-02 RX ADMIN — DOCUSATE SODIUM 50MG AND SENNOSIDES 8.6MG 2 TABLET: 8.6; 5 TABLET, FILM COATED ORAL at 20:43

## 2023-06-02 RX ADMIN — DILTIAZEM HYDROCHLORIDE 180 MG: 180 CAPSULE, COATED, EXTENDED RELEASE ORAL at 08:59

## 2023-06-02 RX ADMIN — ALBUTEROL SULFATE 2.5 MG: 2.5 SOLUTION RESPIRATORY (INHALATION) at 04:46

## 2023-06-02 NOTE — CASE MANAGEMENT/SOCIAL WORK
Continued Stay Note  [facility]     Patient Name: Orion Casiano  MRN: [id number]  Today's Date: 6/2/2023    Admit Date: 5/26/2023    Plan: Home with  referral pending   Discharge Plan     Row Name 06/02/23 1623       Plan    Plan Home with  referral pending    Patient/Family in Agreement with Plan other (see comments)    Plan Comments Recieved notification [facility]  unable to accept. CCP made referral to [facility]. Joan MOY/CCP               Discharge Codes    No documentation.               Expected Discharge Date and Time     Expected Discharge Date Expected Discharge Time    Jun 2, 2023             Adrienne Vargas, RN

## 2023-06-02 NOTE — PROGRESS NOTES
ABG noted. Patient alert and awake. Discussed with RN. Keep sats 88-92%. Await CxR.    Javier Fox MD  06/02/23  05:37 EDT

## 2023-06-02 NOTE — PLAN OF CARE
Goal Outcome Evaluation:  Plan of Care Reviewed With: patient refused bed alarm up to side of bed and bsc as needed. Attempted to wean o2 as tolerated today. RT and all Md's aware of pt status. IV bumex given per Renal orders. No s/s of increased distress. Plan of care discussed with pt and necessity of compliance for safety. Report given to night shift rn

## 2023-06-02 NOTE — PROGRESS NOTES
[facility]  Inpatient Progress Note    Reason for Followup:  Elevated LFTS    Subjective:  Patient with some desats overnight per RN notes into mid 80s.  Pulmonology managing.  He reports feeling tired this morning.  No abd pain.     Current Facility-Administered Medications:   •  acetaminophen (TYLENOL) tablet 650 mg, 650 mg, Oral, Q4H PRN **OR** acetaminophen (TYLENOL) 160 MG/5ML solution 650 mg, 650 mg, Oral, Q4H PRN **OR** acetaminophen (TYLENOL) suppository 650 mg, 650 mg, Rectal, Q4H PRN, Daniel Kaur MD  •  albuterol (PROVENTIL) nebulizer solution 0.083% 2.5 mg/3mL, 2.5 mg, Nebulization, Q6H PRN, Daniel Kaur MD, 2.5 mg at 06/02/23 0446  •  allopurinol (ZYLOPRIM) tablet 100 mg, 100 mg, Oral, Daily, Daniel Kaur MD, 100 mg at 06/01/23 0913  •  apixaban (ELIQUIS) tablet 5 mg, 5 mg, Oral, BID, Daniel Kaur MD, 5 mg at 06/01/23 2051  •  Azelastine HCl solution 274 mcg, 2 spray, Each Nare, BID, Daniel Kaur MD, 274 mcg at 06/01/23 2051  •  sennosides-docusate (PERICOLACE) 8.6-50 MG per tablet 2 tablet, 2 tablet, Oral, BID, 2 tablet at 05/30/23 2003 **AND** polyethylene glycol (MIRALAX) packet 17 g, 17 g, Oral, Daily PRN **AND** bisacodyl (DULCOLAX) EC tablet 5 mg, 5 mg, Oral, Daily PRN **AND** bisacodyl (DULCOLAX) suppository 10 mg, 10 mg, Rectal, Daily PRN, Daniel Kaur MD  •  dilTIAZem CD (CARDIZEM CD) 24 hr capsule 180 mg, 180 mg, Oral, Q12H, Alejandra Terrell MD, 180 mg at 06/01/23 2051  •  ipratropium-albuterol (DUO-NEB) nebulizer solution 3 mL, 3 mL, Nebulization, 4x Daily - RT, Daniel Kaur MD, 3 mL at 06/02/23 0652  •  levoFLOXacin (LEVAQUIN) tablet 750 mg, 750 mg, Oral, Q24H, Casey Gutierrez MD, 750 mg at 06/02/23 0646  •  methotrexate tablet 2.5 mg, 2.5 mg, Oral, Weekly, Daniel Kaur MD, 2.5 mg at 05/30/23 0807  •  montelukast (SINGULAIR) tablet 10 mg, 10 mg, Oral, Nightly, Daniel Kaur MD, 10 mg at 06/01/23 2051  •  nitroglycerin (NITROSTAT) SL tablet 0.4 mg, 0.4 mg,  Sublingual, Q5 Min PRN, Daniel Kaur MD  •  ondansetron (ZOFRAN) injection 4 mg, 4 mg, Intravenous, Q6H PRN, Daniel Kaur MD  •  predniSONE (DELTASONE) tablet 30 mg, 30 mg, Oral, Daily, Casey Gutierrez MD  •  rosuvastatin (CRESTOR) tablet 10 mg, 10 mg, Oral, Daily, Daniel Kaur MD, 10 mg at 06/01/23 0913  •  sodium chloride 0.9 % flush 10 mL, 10 mL, Intravenous, Q12H, Daniel Kaur MD, 10 mL at 06/01/23 2052  •  sodium chloride 0.9 % flush 10 mL, 10 mL, Intravenous, PRN, Daniel Kaur MD  •  sodium chloride 0.9 % infusion 40 mL, 40 mL, Intravenous, PRN, Daniel Kaur MD  •  torsemide (DEMADEX) tablet 20 mg, 20 mg, Oral, Daily, Lesly Blackman MD  •  traZODone (DESYREL) tablet 100 mg, 100 mg, Oral, Daily, Daniel Kaur MD, 100 mg at 06/01/23 0913  Review of Systems:   Gen: No fever or chills  GI: No abd pain, nausea, vomiting      Objective     Vital Signs  Temp:  [97.8 °F (36.6 °C)-98.6 °F (37 °C)] 98 °F (36.7 °C)  Heart Rate:  [] 89  Resp:  [16-24] 20  BP: (129-187)/() 152/103  Body mass index is 30.05 kg/m².    Intake/Output Summary (Last 24 hours) at 6/2/2023 0826  Last data filed at 6/2/2023 0517  Gross per 24 hour   Intake 800 ml   Output 900 ml   Net -100 ml     No intake/output data recorded.     Physical Exam:   General: patient sleeping but awakes easily   Cardiovascular: regular rate, well-perfused extremities   Pulm: Equal expansion bilaterally, no increased WOB on O2   Abdomen: soft, nontender, nondistended;               Neuro: A&O, interactive        Results Review:     I reviewed the patient's new clinical results.    Results from last 7 days   Lab Units 05/31/23  0456 05/29/23  0708 05/28/23  0559   WBC 10*3/mm3 15.95* 21.09* 26.75*   HEMOGLOBIN g/dL 13.2 11.7* 12.3*   HEMATOCRIT % 38.7 33.0* 35.0*   PLATELETS 10*3/mm3 459* 322 310     Results from last 7 days   Lab Units 06/02/23  0715 06/01/23  0848 05/31/23  0456   SODIUM mmol/L 139 136 136   POTASSIUM mmol/L 5.0 4.7 5.0    CHLORIDE mmol/L 99 97* 100   CO2 mmol/L 34.6* 32.0* 25.4   BUN mg/dL 50* 47* 44*   CREATININE mg/dL 1.26 1.34* 1.24   CALCIUM mg/dL 9.6 9.3 10.1   BILIRUBIN mg/dL 0.9 1.0 1.2   ALK PHOS U/L 124* 147* 155*   ALT (SGPT) U/L 69* 82* 94*   AST (SGOT) U/L 55* 79* 119*   GLUCOSE mg/dL 88 144* 140*         No results found for: LIPASE    Radiology:  [unfilled]      Assessment & Plan   Assessment:   1. Elevated LFTs    2.  Legionella pneumonia   3.  Asthma exacerbation  4.  Interstitial lung disease on immunosuppression and supplemental oxygen  5.  MURIEL on CKD  6.  Atrial fibrillation requiring long-term anticoagulation  7.  Personal history of colon polyps with adenomatous changes - recall 3 years      Plan:   - US liver notable for enlarge liver, no biliary dilattaion, no stones/sludge in GB or GB wall thikcening  - Portal duplex normal, SMV and distal splenic vein not well seen due to overlying bowel gas  - Hep panel negative  - LFTs have continued to downtrend, may be related to sepsis  - Would continue to periodically monitor  - Follow up with PCP following discharge    GI will sign off and see PRN.  Please let us know if there are questions or concerns.  I discussed the patient's findings and my recommendations with patient.

## 2023-06-02 NOTE — PROGRESS NOTES
Nutrition Services    Patient Name:  Orion Casiano  YOB: 1950  MRN: [id number]  Admit Date:  5/26/2023  Assessment Date:  06/02/23    Comment: Nutrition screen for Length of Stay  Dx: Pneumonia/Acute on chronic hypoxic respiratory failure, MURIEL    Visited pt at breakfast time. Po good 100%  BMI 30  Labs reviewed    Will remain available as needed.    CLINICAL NUTRITION ASSESSMENT      Reason for Assessment Length of Stay     Diagnosis/Problem   Pneumonia/Acute on chronic hypoxic respiratory failure, MURIEL   Medical/Surgical History Past Medical History:   Diagnosis Date   • Acute bronchitis    • Adenomatous polyp of colon 01/08/2020    Added automatically from request for surgery [id number]   • Allergic rhinitis    • Asthma    • Bacterial pneumonia    • Bilateral lower leg cellulitis    • Colon polyp    • Cutaneous abscess of left lower limb    • Deficiency of other specified B group vitamins    • Dermatitis    • Disc disorder of lumbar region     worse at L4-5 with protusion/herniation   • Dysuria    • Effusion, right hand     resolved   • Essential (primary) hypertension    • Frequency of micturition    • Gout    • Hematuria    • Hyperglycemia    • Hyperlipidemia    • Hypertension    • ILD (interstitial lung disease)    • Insomnia    • Localized swelling, mass and lump, unspecified    • Low back pain    • Lumbago    • Microscopic hematuria    • Pain in left knee    • Sleep disturbances    • Vertigo     resolved   • Vitamin B12 deficiency        Past Surgical History:   Procedure Laterality Date   • BRONCHOSCOPY  02/27/2023    Procedure: BRONCHOSCOPY WITH FLUORO, BAL, AND BIOPSIES AND ENDOBRONCHIAL ULTRASOUND WITH FNA;  Surgeon: Rubens Sheets MD;  Location: [facility] ENDOSCOPY;  Service: Pulmonary;;  INTERSTITIAL LUNG DISEASE   • CARDIAC CATHETERIZATION N/A 02/24/2023    Procedure: Right Heart Cath;  Surgeon: Ravi Fonseca MD;  Location: [facility] CATH INVASIVE LOCATION;  Service: Cardiovascular;   "Laterality: N/A;   • COLONOSCOPY      maybe 2016   • COLONOSCOPY N/A 03/11/2020    Procedure: COLONOSCOPY TO CECUM AND TI WITH COLD AND HOT SNARE AND POLYPECTOMIES;  Surgeon: Patti Navarro MD;  Location: [facility] ENDOSCOPY;  Service: Gastroenterology;  Laterality: N/A;  PRE: H/O COLON POLYPS  POST: POLYPS, HEMORRHOIDS, DIVERTICULOSIS   • LUMBAR DISC SURGERY      2008   • NOSE SURGERY          Encounter Information        Nutrition History:  Appetite fine   Food Preferences:    Supplements:    Factors Affecting Intake: No factors at this time     Anthropometrics        Current Height  Current Weight  BMI kg/m2 Height: 186.7 cm (73.5\")  Weight: 105 kg (230 lb 14.4 oz) (05/27/23 0520)  Body mass index is 30.05 kg/m².   Adjusted BMI (if applicable)    BMI Category Obese, Class I (30 - 34.9)       Admission Weight        Ideal Body Weight (IBW) 81.1 kg   Adjusted IBW (if applicable)        Usual Body Weight (UBW) See wt hx   Weight Change/Trend Other: wt varies       Weight History Wt Readings from Last 30 Encounters:   05/27/23 0520 105 kg (230 lb 14.4 oz)   05/26/23 2146 104 kg (228 lb 14.4 oz)   05/26/23 1339 103 kg (228 lb)   05/08/23 1040 109 kg (241 lb 6.4 oz)   03/08/23 1017 110 kg (242 lb 1.6 oz)   02/24/23 0739 102 kg (224 lb 13.9 oz)   02/24/23 0411 102 kg (225 lb)   02/24/23 0036 102 kg (225 lb)   02/22/23 1629 102 kg (225 lb)   03/11/20 0901 111 kg (244 lb 8 oz)   10/13/19 1004 109 kg (240 lb)   05/30/18 0748 109 kg (240 lb)   12/02/13 1331 113 kg (250 lb)   07/10/13 1129 113 kg (250 lb)           --  Tests/Procedures        Tests/Procedures No new tests/procedures, X-Ray     Labs       Pertinent Labs    Results from last 7 days   Lab Units 06/02/23  0715 06/01/23  0848 05/31/23  0456   SODIUM mmol/L 139 136 136   POTASSIUM mmol/L 5.0 4.7 5.0   CHLORIDE mmol/L 99 97* 100   CO2 mmol/L 34.6* 32.0* 25.4   BUN mg/dL 50* 47* 44*   CREATININE mg/dL 1.26 1.34* 1.24   CALCIUM mg/dL 9.6 9.3 10.1   BILIRUBIN mg/dL " 0.9 1.0 1.2   ALK PHOS U/L 124* 147* 155*   ALT (SGPT) U/L 69* 82* 94*   AST (SGOT) U/L 55* 79* 119*   GLUCOSE mg/dL 88 144* 140*     Results from last 7 days   Lab Units 06/02/23  0715 06/01/23  0848 05/31/23  0456   HEMOGLOBIN g/dL  --   --  13.2   HEMATOCRIT %  --   --  38.7   WBC 10*3/mm3  --   --  15.95*   ALBUMIN g/dL 2.8*   < > 3.1*    < > = values in this interval not displayed.     Results from last 7 days   Lab Units 05/31/23  0456 05/29/23  0708 05/28/23  0559 05/27/23  0627 05/26/23  1429   PLATELETS 10*3/mm3 459* 322 310 270 266     COVID19   Date Value Ref Range Status   05/26/2023 Not Detected Not Detected - Ref. Range Final     No results found for: HGBA1C       Medications           Scheduled Medications allopurinol, 100 mg, Oral, Daily  apixaban, 5 mg, Oral, BID  Azelastine HCl, 2 spray, Each Nare, BID  bumetanide, 2 mg, Intravenous, Q12H  dilTIAZem CD, 180 mg, Oral, Q12H  ipratropium-albuterol, 3 mL, Nebulization, 4x Daily - RT  levoFLOXacin, 750 mg, Oral, Q24H  methotrexate, 2.5 mg, Oral, Weekly  montelukast, 10 mg, Oral, Nightly  predniSONE, 30 mg, Oral, Daily  rosuvastatin, 10 mg, Oral, Daily  senna-docusate sodium, 2 tablet, Oral, BID  sodium chloride, 10 mL, Intravenous, Q12H  traZODone, 100 mg, Oral, Daily       Infusions     PRN Medications •  acetaminophen **OR** acetaminophen **OR** acetaminophen  •  albuterol  •  senna-docusate sodium **AND** polyethylene glycol **AND** bisacodyl **AND** bisacodyl  •  nitroglycerin  •  ondansetron  •  sodium chloride  •  sodium chloride     Physical Findings          Physical Appearance alert, oriented   Oral/Mouth Cavity WNL   Edema  no edema   Gastrointestinal normoactive   Skin  skin intact   Tubes/Drains/Lines none   NFPE No clinical signs of muscle wasting or fat loss   --  Current Nutrition Orders & Evaluation of Intake       Oral Nutrition     Food Allergies NKFA   Current PO Diet Diet: Renal Diets; Low Sodium (2-3g), Low Potassium, Low  Phosphorus; Texture: Regular Texture (IDDSI 7); Fluid Consistency: Thin (IDDSI 0)   Supplement n/a   PO Evaluation     % PO Intake 100%    # of Days Evaluated    --  PES STATEMENT / NUTRITION DIAGNOSIS      Nutrition Dx Problem  Problem: Nutrition Appropriate for Condition at this Time  Etiology: Medical Diagnosis Pneumonia, MURIEL  Signs/Symptoms: Report/Observation    Comment:    --  NUTRITION INTERVENTION / PLAN OF CARE      Intervention Goal(s) Maintain nutrition status and No significant weight loss         RD Intervention/Action Interview for preferences and Follow Tx Progress     --      Monitor/Evaluation Per protocol   Discharge Plan/Needs Pending clinical course   Education Will instruct as appropriate   --    RD to follow per protocol.      Electronically signed by:  Angelica Sanchez RD  06/02/23 12:38 EDT

## 2023-06-02 NOTE — PROGRESS NOTES
[facility] Follow Up    Chief Complaint: Follow up afib    Interval History: Events overnight noted.  The patient was placed on Optiflow.  However when I walked in the room this morning he was off oxygen and both his pulse oximeter and his gown were completely off.  According to the nurse she had just replaced all those things before I had walked in.  Patient does not really report any significant shortness of breath.  He did appear comfortable on my exam.  Denies any pain.    Objective:     Objective:  Temp:  [97.8 °F (36.6 °C)-98.6 °F (37 °C)] 98 °F (36.7 °C)  Heart Rate:  [] 91  Resp:  [16-24] 20  BP: (129-187)/() 152/103     Intake/Output Summary (Last 24 hours) at 6/2/2023 0909  Last data filed at 6/2/2023 0517  Gross per 24 hour   Intake 800 ml   Output 900 ml   Net -100 ml     Body mass index is 30.05 kg/m².      05/26/23  1339 05/26/23  2146 05/27/23  0520   Weight: 103 kg (228 lb) 104 kg (228 lb 14.4 oz) 105 kg (230 lb 14.4 oz)     Weight change:       Physical Exam:   General : Alert, cooperative, in no acute distress.  Neuro: Alert,cooperative and oriented.  Lungs: CTAB. Normal respiratory effort and rate.  CV: Irregularly, irregular, normal S1 and S2, no murmurs, gallops or rubs.  ABD: Soft, nontender, nondistended. Positive bowel sounds.  Extr: No edema or cyanosis, moves all extremities.    Lab Review:   Results from last 7 days   Lab Units 06/02/23  0715 06/01/23  0848   SODIUM mmol/L 139 136   POTASSIUM mmol/L 5.0 4.7   CHLORIDE mmol/L 99 97*   CO2 mmol/L 34.6* 32.0*   BUN mg/dL 50* 47*   CREATININE mg/dL 1.26 1.34*   GLUCOSE mg/dL 88 144*   CALCIUM mg/dL 9.6 9.3   AST (SGOT) U/L 55* 79*   ALT (SGPT) U/L 69* 82*         Results from last 7 days   Lab Units 05/31/23  0456 05/29/23  0708   WBC 10*3/mm3 15.95* 21.09*   HEMOGLOBIN g/dL 13.2 11.7*   HEMATOCRIT % 38.7 33.0*   PLATELETS 10*3/mm3 459* 322                   Invalid input(s): LDLCALC      Results from last 7  days   Lab Units 05/30/23  0429   TSH uIU/mL 0.257*     I reviewed the patient's new clinical results.  I personally viewed and interpreted the patient's EKG  Current Medications:   Scheduled Meds:allopurinol, 100 mg, Oral, Daily  apixaban, 5 mg, Oral, BID  Azelastine HCl, 2 spray, Each Nare, BID  dilTIAZem CD, 180 mg, Oral, Q12H  ipratropium-albuterol, 3 mL, Nebulization, 4x Daily - RT  levoFLOXacin, 750 mg, Oral, Q24H  methotrexate, 2.5 mg, Oral, Weekly  montelukast, 10 mg, Oral, Nightly  predniSONE, 30 mg, Oral, Daily  rosuvastatin, 10 mg, Oral, Daily  senna-docusate sodium, 2 tablet, Oral, BID  sodium chloride, 10 mL, Intravenous, Q12H  torsemide, 20 mg, Oral, Daily  traZODone, 100 mg, Oral, Daily      Continuous Infusions:     Allergies:  Allergies   Allergen Reactions   • Ibuprofen Shortness Of Breath   • Aspirin        Assessment/Plan:     1.  Legionella pneumonia.    On antibiotics.  2.  Asthma with exacerbation.  3.  Acute on chronic hypoxic respiratory failure.  Worsening oxygen requirements overnight.  However he has been noncompliant with monitoring and wearing his oxygen appropriately.    4.  Atrial fibrillation.  Remains persistent.  Rates a little bit higher may be because of worsening respiratory issues.  Remains on diltiazem and anticoagulation with apixaban.  5.  Hypertension.      Elevated this morning.  6.  MURIEL on CKD.  Improved.  On torsemide per nephrology.    - Continue diltiazem and apixaban.  - Monitor blood pressures today and consider additional medications if remains elevated.    Alejandra Terrell MD  06/02/23  09:09 EDT

## 2023-06-02 NOTE — PROGRESS NOTES
"                                              LOS: 7 days   Patient Care Team:  Niecy Galvan MD as PCP - General (Family Medicine)  Patti Navarro MD as Consulting Physician (Gastroenterology)    Chief Complaint:  F/up pneumonia, respiratory failure and ILD.    Subjective   Interval History  Patient transition to heated high flow overnight 60 L 40%.  Desaturates with minimal activity.    REVIEW OF SYSTEMS:     GASTROINTESTINAL: No nausea or vomiting.  Mild diarrhea.  CONSTITUTIONAL: No fever or chills.     Ventilator/Non-Invasive Ventilation Settings (From admission, onward)    None                Physical Exam:     Vital Signs  Temp:  [97.8 °F (36.6 °C)-98.6 °F (37 °C)] 98 °F (36.7 °C)  Heart Rate:  [] 82  Resp:  [16-24] 24  BP: (129-187)/() 152/103    Intake/Output Summary (Last 24 hours) at 6/2/2023 1244  Last data filed at 6/2/2023 0517  Gross per 24 hour   Intake --   Output 900 ml   Net -900 ml     Flowsheet Rows    Flowsheet Row First Filed Value   Admission Height 186.7 cm (73.5\") Documented at 05/26/2023 1339   Admission Weight 103 kg (228 lb) Documented at 05/26/2023 1339          PPE used per hospital policy    General Appearance:   Alert, cooperative, in no acute distress   ENMT:  Mallampati score 3, moist mucous membrane   Eyes:  Pupils equal and reactive to light. EOMI   Neck:   Large. Trachea midline. No thyromegaly.   Lungs:    Diffuse crackles on the right.  No wheezing.  No use of accessory muscles.    Heart:   Regular rhythm and normal rate, normal S1 and S2, no         murmur   Skin:   No rash or ecchymosis   Abdomen:    Obese. Soft. No tenderness. No HSM.   Neuro/psych:  Conscious, alert, oriented x3. Strength 5/5 in upper and lower  ext.  Appropriate mood and affect   Extremities:  No cyanosis, clubbing or edema.  Warm extremities and well-perfused          Results Review:        Results from last 7 days   Lab Units 06/02/23  0715 06/01/23  0848 05/31/23  0456 "   SODIUM mmol/L 139 136 136   POTASSIUM mmol/L 5.0 4.7 5.0   CHLORIDE mmol/L 99 97* 100   CO2 mmol/L 34.6* 32.0* 25.4   BUN mg/dL 50* 47* 44*   CREATININE mg/dL 1.26 1.34* 1.24   GLUCOSE mg/dL 88 144* 140*   CALCIUM mg/dL 9.6 9.3 10.1         Results from last 7 days   Lab Units 05/31/23  0456 05/29/23  0708 05/28/23  0559   WBC 10*3/mm3 15.95* 21.09* 26.75*   HEMOGLOBIN g/dL 13.2 11.7* 12.3*   HEMATOCRIT % 38.7 33.0* 35.0*   PLATELETS 10*3/mm3 459* 322 310                           Results from last 7 days   Lab Units 06/02/23  0518 05/26/23  1526   PH, ARTERIAL pH units 7.340* 7.468*   PCO2, ARTERIAL mm Hg 63.1* 33.7*   PO2 ART mm Hg 91.1 50.9*   FLOW RATE lpm 7 1   MODALITY  HFNC Cannula   O2 SATURATION CALC % 96.1 88.2*         I reviewed the patient's new clinical results.        Medication Review:   allopurinol, 100 mg, Oral, Daily  apixaban, 5 mg, Oral, BID  Azelastine HCl, 2 spray, Each Nare, BID  bumetanide, 2 mg, Intravenous, Q12H  dilTIAZem CD, 180 mg, Oral, Q12H  ipratropium-albuterol, 3 mL, Nebulization, 4x Daily - RT  levoFLOXacin, 750 mg, Oral, Q24H  methotrexate, 2.5 mg, Oral, Weekly  montelukast, 10 mg, Oral, Nightly  predniSONE, 30 mg, Oral, Daily  rosuvastatin, 10 mg, Oral, Daily  senna-docusate sodium, 2 tablet, Oral, BID  sodium chloride, 10 mL, Intravenous, Q12H  traZODone, 100 mg, Oral, Daily            Assessment     1. Right pneumonia, Legionella  2. ILD, unspecified seems that he has mild fibrotic changes as well consistent with honeycombing but no typical UIP.  Increase in the GG infiltrates in the right lower lobe on latest CT chest 2/22/2023 compared to 7/8/2022.  S/p bronch with BAL and TBB 2/27, unrevealing with the exception of histiocytes and eosinophils on the BAL.  3. 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  Stability confirmed at least when compared to exam done 7/8/2022..  Suspect it is reactive secondary to ILD.  S/p TB NA  2/27 -> histiocytes  4. Pulmonary hypertension, moderate. mPAP 34, PCWP 19- Transpulmonary P: 15.  Consistent with group 2 and group 3.  5. Asthma exacerbation  6. 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 Immunosuppression: On Imuran 150 mg and prednisone 10 mg daily     8. Atrial fibrillation  9. Shortness of breath, secondary to the above  10. Psoriasis    Plan     · Complete Levaquin for 7 days..  Oxygenation was leukocytosis stable and improving.  Repeat CBC.  I will check CT chest to evaluate further.  ILD may be complicating his current condition.  · Oxygen requirement worse will encourage increased mobilization and incentive spirometry.  May be able to transition to high flow cannula later today.  · I will continue weaning down steroids as tolerated  · History of lymphadenopathy which has been stable.  This was felt to be secondary to ILD.  · DuoNeb 4 times a day  · VTE prophylaxis: Eliquis.  · Mobilize and ambulate        Casey Gutierrez MD  06/02/23  12:44 EDT          This note was dictated utilizing Dragon dictation

## 2023-06-02 NOTE — PROGRESS NOTES
[facility] Progress Note      Patient Name: Orion Casiano  : 1950  MRN: [id number]  Primary Care Physician:  Niecy Galvan MD  Date of admission: 2023    Subjective     Interval History:     Seen and examined on 7 L nasal cannula.  Appears to be more short of air and was placed on Optiflow overnight.  ABG was done and noted.  Currently trying to have a BM.    Review of Systems:   As noted above    Objective     Vitals:   Temp:  [97.8 °F (36.6 °C)-98.6 °F (37 °C)] 98 °F (36.7 °C)  Heart Rate:  [] 82  Resp:  [16-24] 24  BP: (129-187)/() 152/103  Flow (L/min):  [5-60] 60    Intake/Output Summary (Last 24 hours) at 2023 1057  Last data filed at 2023 0517  Gross per 24 hour   Intake 800 ml   Output 900 ml   Net -100 ml       Physical Exam:    General Appearance: alert, oriented  no acute distress   Skin: warm and dry  HEENT: oral mucosa normal, nonicteric sclera  Neck: supple, no JVD  Lungs: CTA  Heart: RRR, normal S1 and S2  Abdomen: soft, nontender, nondistended  : no palpable bladder  Extremities: no edema, cyanosis or clubbing      Scheduled Meds:     allopurinol, 100 mg, Oral, Daily  apixaban, 5 mg, Oral, BID  Azelastine HCl, 2 spray, Each Nare, BID  dilTIAZem CD, 180 mg, Oral, Q12H  ipratropium-albuterol, 3 mL, Nebulization, 4x Daily - RT  levoFLOXacin, 750 mg, Oral, Q24H  methotrexate, 2.5 mg, Oral, Weekly  montelukast, 10 mg, Oral, Nightly  predniSONE, 30 mg, Oral, Daily  rosuvastatin, 10 mg, Oral, Daily  senna-docusate sodium, 2 tablet, Oral, BID  sodium chloride, 10 mL, Intravenous, Q12H  torsemide, 20 mg, Oral, Daily  traZODone, 100 mg, Oral, Daily      IV Meds:        Results Reviewed:   I have personally reviewed the results from the time of this admission to 2023 10:57 EDT     Results from last 7 days   Lab Units 23  0715 23  0848 23  0456   SODIUM mmol/L 139 136 136   POTASSIUM mmol/L 5.0 4.7 5.0   CHLORIDE  mmol/L 99 97* 100   CO2 mmol/L 34.6* 32.0* 25.4   BUN mg/dL 50* 47* 44*   CREATININE mg/dL 1.26 1.34* 1.24   CALCIUM mg/dL 9.6 9.3 10.1   BILIRUBIN mg/dL 0.9 1.0 1.2   ALK PHOS U/L 124* 147* 155*   ALT (SGPT) U/L 69* 82* 94*   AST (SGOT) U/L 55* 79* 119*   GLUCOSE mg/dL 88 144* 140*     Estimated Creatinine Clearance: 68 mL/min (by C-G formula based on SCr of 1.26 mg/dL).      Results from last 7 days   Lab Units 05/29/23  0708   URIC ACID mg/dL 7.6*     Results from last 7 days   Lab Units 05/31/23  0456 05/29/23  0708 05/28/23  0559 05/27/23  0627 05/26/23  1429   WBC 10*3/mm3 15.95* 21.09* 26.75* 19.18* 22.52*   HEMOGLOBIN g/dL 13.2 11.7* 12.3* 13.4 13.7   PLATELETS 10*3/mm3 459* 322 310 270 266           Assessment / Plan     ASSESSMENT:  1.  Acute kidney injury: Creatinine is up at 1.8 mg/dL at time of admission from baseline of 0.9 however it has been improving with latest creatinine 1.2 mg/dL that is likely due to sepsis and decreased renal perfusion in setting of renal autoregulation impairment due to lisinopril.      2.  Hyponatremia: Improving with last sodium 139  3.  Interstitial lung disease in the right Legionella pneumonia management per pulmonary.  Patient currently on steroid and Levaquin  4.  History of pulm hypertension with last pulmonary arterial pressures 34 and PCWP at 19.  5.  Immunosuppression currently on Imuran and prednisone  6.  History of atrial fibrillation        PLAN:     1.  Will switch him back to Bumex 2 mg IV twice daily  2.  Surveillance labs    Thank you for involving us in the care of Orion Casiano.  Please feel free to call with any questions.    Lesly Blackman MD  06/02/23  10:57 EDT    [facility]  [phone]

## 2023-06-02 NOTE — NURSING NOTE
Patient A&Ox4.  At beginning of shift, patient on 7L NC.  Respiratory came by and placed patient on 7L HF.  While on side of bed urinating, patient oxygen saturation dropped into the mid 80's.  Oxygen turned up to 8L HF.  Respiratory gave patient a breathing treatment and afterwards turned patient up to 9L HF.  Called Dr. Fox, Pulmonary, who ordered chest xr and ABG's.  ABG came back critical and called and informed Dr. Fox who stated to keep patient oxygen between 88 - 92%.

## 2023-06-02 NOTE — PROGRESS NOTES
Name: Orion Casiano ADMIT: 2023   : 1950  PCP: Niecy Galvan MD    MRN: [id number] LOS: 7 days   AGE/SEX: 72 y.o. male  ROOM: [facility]     Subjective   Subjective   No complaints today.  Feels his breathing is improved.    Objective   Objective   Vital Signs  Temp:  [97.8 °F (36.6 °C)-98.6 °F (37 °C)] 98.6 °F (37 °C)  Heart Rate:  [] 83  Resp:  [16-24] 22  BP: (127-187)/() 127/108  SpO2:  [74 %-100 %] 99 %  on  Flow (L/min):  [7-60] 60;   Device (Oxygen Therapy): humidified;heated;high-flow nasal cannula  Body mass index is 30.05 kg/m².  Physical Exam  Constitutional:       General: He is not in acute distress.     Appearance: Normal appearance. He is ill-appearing and toxic-appearing.   Cardiovascular:      Rate and Rhythm: Normal rate and regular rhythm.   Pulmonary:      Effort: Pulmonary effort is normal.      Breath sounds: Rales present. No wheezing or rhonchi.      Comments: Expiratory wheezing L > R  Chest:      Chest wall: No tenderness.   Abdominal:      General: Abdomen is flat. There is no distension.      Tenderness: There is no abdominal tenderness.   Skin:     Comments: Xerosis   Neurological:      General: No focal deficit present.      Mental Status: He is alert and oriented to person, place, and time.       Results Review     I reviewed the patient's new clinical results.  Results from last 7 days   Lab Units 23  0456 23  0708 23  0559 23  0627   WBC 10*3/mm3 15.95* 21.09* 26.75* 19.18*   HEMOGLOBIN g/dL 13.2 11.7* 12.3* 13.4   PLATELETS 10*3/mm3 459* 322 310 270     Results from last 7 days   Lab Units 23  0715 23  0848 23  0456 23  0429   SODIUM mmol/L 139 136 136 133*   POTASSIUM mmol/L 5.0 4.7 5.0 4.9   CHLORIDE mmol/L 99 97* 100 99   CO2 mmol/L 34.6* 32.0* 25.4 23.6   BUN mg/dL 50* 47* 44* 43*   CREATININE mg/dL 1.26 1.34* 1.24 1.47*   GLUCOSE mg/dL 88 144* 140* 144*   EGFR mL/min/1.73 60.6 56.3* 61.8 50.4*      Results from last 7 days   Lab Units 06/02/23  0715 06/01/23  0848 05/31/23  0456 05/30/23  0429   ALBUMIN g/dL 2.8* 3.1* 3.1* 2.9*   BILIRUBIN mg/dL 0.9 1.0 1.2 1.4*   ALK PHOS U/L 124* 147* 155* 161*   AST (SGOT) U/L 55* 79* 119* 166*   ALT (SGPT) U/L 69* 82* 94* 90*     Results from last 7 days   Lab Units 06/02/23  0715 06/01/23  0848 05/31/23  0456 05/30/23  0429   CALCIUM mg/dL 9.6 9.3 10.1 9.6   ALBUMIN g/dL 2.8* 3.1* 3.1* 2.9*     Results from last 7 days   Lab Units 05/26/23  1727   LACTATE mmol/L 2.0     No results found for: HGBA1C, POCGLU    CT Chest Without Contrast Diagnostic    Result Date: 6/2/2023   1.  New patchy consolidation in the right lower lobe concerning for pneumonia. Findings are superimposed on chronic fibrotic changes, right greater than left, which have progressed. Recommend short-term follow-up CT chest in 3 months. 2.  Lymphadenopathy has overall slightly improved. Attention on follow-up imaging is recommended.   This report was finalized on 6/2/2023 3:28 PM by Dr. Caryn Frost M.D.      Scheduled Medications  allopurinol, 100 mg, Oral, Daily  apixaban, 5 mg, Oral, BID  Azelastine HCl, 2 spray, Each Nare, BID  bumetanide, 2 mg, Intravenous, Q12H  dilTIAZem CD, 180 mg, Oral, Q12H  ipratropium-albuterol, 3 mL, Nebulization, 4x Daily - RT  levoFLOXacin, 750 mg, Oral, Q24H  methotrexate, 2.5 mg, Oral, Weekly  montelukast, 10 mg, Oral, Nightly  predniSONE, 30 mg, Oral, Daily  rosuvastatin, 10 mg, Oral, Daily  senna-docusate sodium, 2 tablet, Oral, BID  sodium chloride, 10 mL, Intravenous, Q12H  traZODone, 100 mg, Oral, Daily    Infusions   Diet  Diet: Renal Diets; Low Sodium (2-3g), Low Potassium, Low Phosphorus; Texture: Regular Texture (IDDSI 7); Fluid Consistency: Thin (IDDSI 0)       Assessment/Plan     Active Hospital Problems    Diagnosis  POA    **Healthcare-associated pneumonia [J18.9]  Yes    A-fib [I48.91]  Unknown    MURIEL (acute kidney injury) [N17.9]  Unknown    Acute  respiratory failure with hypoxia [J96.01]  Yes    Chronic interstitial lung disease [J84.9]  Yes    Primary hypertension [I10]  Yes    Asthma [J45.909]  Yes      Resolved Hospital Problems   No resolved problems to display.       72 y.o. male admitted with Healthcare-associated pneumonia.      06/02/23  Continue antibiotics, wean oxygen.    Acute on chronic hypoxic respiratory failure  Legionella pneumonia  ILD (2L home O2)   -still requiring 4-5L NC  -DuoNebs, prednisone  -CT chest (6-23): New patchy consolidation in RLL concerning for PNA; repeat chest CT in 3 months recommended (around 9/2/2023)  -Pulmonology following  -cont Levaquin 7 days    MURIEL  -Crt 1.83 OA (b/l ~0.9) > 1.3 (6/1) > 1.26 > 1.33  -Nephrology following  -Bumex 2 mg twice daily    A-fib  -RC: Diltiazem 180 mg twice daily  - AC: Apixaban    Gout  -Allopurinol 100 mg    Transaminitis  -GI following  -US w/ hepatomegaly  -duplex WNL      Eliquis (home med) for DVT prophylaxis.  Discussed with patient and care team on multidisciplinary rounds.  Anticipate discharge home when cleared by consultants      Jerod Ram MD  [facility]  06/02/23  16:32 EDT

## 2023-06-03 LAB
ALBUMIN SERPL-MCNC: 2.6 G/DL (ref 3.5–5.2)
ALBUMIN/GLOB SERPL: 0.7 G/DL
ALP SERPL-CCNC: 121 U/L (ref 39–117)
ALT SERPL W P-5'-P-CCNC: 65 U/L (ref 1–41)
ANION GAP SERPL CALCULATED.3IONS-SCNC: 6.1 MMOL/L (ref 5–15)
AST SERPL-CCNC: 45 U/L (ref 1–40)
BILIRUB SERPL-MCNC: 1.1 MG/DL (ref 0–1.2)
BUN SERPL-MCNC: 55 MG/DL (ref 8–23)
BUN/CREAT SERPL: 41.4 (ref 7–25)
CALCIUM SPEC-SCNC: 9.3 MG/DL (ref 8.6–10.5)
CHLORIDE SERPL-SCNC: 95 MMOL/L (ref 98–107)
CO2 SERPL-SCNC: 37.9 MMOL/L (ref 22–29)
CREAT SERPL-MCNC: 1.33 MG/DL (ref 0.76–1.27)
EGFRCR SERPLBLD CKD-EPI 2021: 56.8 ML/MIN/1.73
GLOBULIN UR ELPH-MCNC: 3.8 GM/DL
GLUCOSE SERPL-MCNC: 76 MG/DL (ref 65–99)
MRSA DNA SPEC QL NAA+PROBE: NORMAL
POTASSIUM SERPL-SCNC: 4.7 MMOL/L (ref 3.5–5.2)
PROT SERPL-MCNC: 6.4 G/DL (ref 6–8.5)
SODIUM SERPL-SCNC: 139 MMOL/L (ref 136–145)

## 2023-06-03 PROCEDURE — 87205 SMEAR GRAM STAIN: CPT | Performed by: INTERNAL MEDICINE

## 2023-06-03 PROCEDURE — 87070 CULTURE OTHR SPECIMN AEROBIC: CPT | Performed by: INTERNAL MEDICINE

## 2023-06-03 PROCEDURE — 80053 COMPREHEN METABOLIC PANEL: CPT | Performed by: INTERNAL MEDICINE

## 2023-06-03 PROCEDURE — 94761 N-INVAS EAR/PLS OXIMETRY MLT: CPT

## 2023-06-03 PROCEDURE — 94799 UNLISTED PULMONARY SVC/PX: CPT

## 2023-06-03 PROCEDURE — 94664 DEMO&/EVAL PT USE INHALER: CPT

## 2023-06-03 PROCEDURE — 99232 SBSQ HOSP IP/OBS MODERATE 35: CPT | Performed by: INTERNAL MEDICINE

## 2023-06-03 PROCEDURE — 87641 MR-STAPH DNA AMP PROBE: CPT | Performed by: INTERNAL MEDICINE

## 2023-06-03 PROCEDURE — 63710000001 PREDNISONE PER 1 MG: Performed by: INTERNAL MEDICINE

## 2023-06-03 PROCEDURE — 63710000001 PREDNISONE PER 5 MG: Performed by: INTERNAL MEDICINE

## 2023-06-03 RX ADMIN — AZELASTINE HYDROCHLORIDE 274 MCG: 137 SPRAY, METERED NASAL at 08:54

## 2023-06-03 RX ADMIN — AZELASTINE HYDROCHLORIDE 274 MCG: 137 SPRAY, METERED NASAL at 20:55

## 2023-06-03 RX ADMIN — BUMETANIDE 2 MG: 0.25 INJECTION INTRAMUSCULAR; INTRAVENOUS at 11:50

## 2023-06-03 RX ADMIN — IPRATROPIUM BROMIDE AND ALBUTEROL SULFATE 3 ML: 2.5; .5 SOLUTION RESPIRATORY (INHALATION) at 10:45

## 2023-06-03 RX ADMIN — Medication 10 ML: at 20:53

## 2023-06-03 RX ADMIN — ROSUVASTATIN CALCIUM 10 MG: 10 TABLET, FILM COATED ORAL at 08:52

## 2023-06-03 RX ADMIN — DOCUSATE SODIUM 50MG AND SENNOSIDES 8.6MG 2 TABLET: 8.6; 5 TABLET, FILM COATED ORAL at 08:53

## 2023-06-03 RX ADMIN — TRAZODONE HYDROCHLORIDE 100 MG: 100 TABLET ORAL at 08:52

## 2023-06-03 RX ADMIN — Medication 10 ML: at 08:53

## 2023-06-03 RX ADMIN — MONTELUKAST SODIUM 10 MG: 10 TABLET, FILM COATED ORAL at 20:52

## 2023-06-03 RX ADMIN — IPRATROPIUM BROMIDE AND ALBUTEROL SULFATE 3 ML: 2.5; .5 SOLUTION RESPIRATORY (INHALATION) at 20:18

## 2023-06-03 RX ADMIN — BUMETANIDE 2 MG: 0.25 INJECTION INTRAMUSCULAR; INTRAVENOUS at 00:31

## 2023-06-03 RX ADMIN — Medication 10 ML: at 11:53

## 2023-06-03 RX ADMIN — IPRATROPIUM BROMIDE AND ALBUTEROL SULFATE 3 ML: 2.5; .5 SOLUTION RESPIRATORY (INHALATION) at 14:57

## 2023-06-03 RX ADMIN — LEVOFLOXACIN 750 MG: 750 TABLET, FILM COATED ORAL at 07:26

## 2023-06-03 RX ADMIN — PREDNISONE 30 MG: 10 TABLET ORAL at 08:52

## 2023-06-03 RX ADMIN — IPRATROPIUM BROMIDE AND ALBUTEROL SULFATE 3 ML: 2.5; .5 SOLUTION RESPIRATORY (INHALATION) at 07:38

## 2023-06-03 RX ADMIN — APIXABAN 5 MG: 5 TABLET, FILM COATED ORAL at 08:52

## 2023-06-03 RX ADMIN — DILTIAZEM HYDROCHLORIDE 180 MG: 180 CAPSULE, COATED, EXTENDED RELEASE ORAL at 21:46

## 2023-06-03 RX ADMIN — APIXABAN 5 MG: 5 TABLET, FILM COATED ORAL at 20:52

## 2023-06-03 RX ADMIN — DILTIAZEM HYDROCHLORIDE 180 MG: 180 CAPSULE, COATED, EXTENDED RELEASE ORAL at 08:52

## 2023-06-03 RX ADMIN — ALLOPURINOL 100 MG: 100 TABLET ORAL at 08:52

## 2023-06-03 NOTE — PLAN OF CARE
Goal Outcome Evaluation:  Plan of Care Reviewed With: patient        Progress: no change  Outcome Evaluation: A&O. Optiflow at 60L 40%. Afib. IV Bumex per order. Voiding per urinal. No complaints of pain. Pt refusing bed alarm. WCTM.

## 2023-06-03 NOTE — PLAN OF CARE
Problem: Adult Inpatient Plan of Care  Goal: Plan of Care Review  Outcome: Ongoing, Not Progressing  Flowsheets (Taken 6/3/2023 1503)  Plan of Care Reviewed With:   patient   son  Outcome Evaluation: Patient is alert and oriented times 4, optiflow 50 L 35% oxygen with episodes of shortness of breath when active needing rest and assistance, Dr Tam saw the aflutter today at noon and son visited the patient.  Goal: Optimal Comfort and Wellbeing  Outcome: Ongoing, Not Progressing  Intervention: Provide Person-Centered Care  Recent Flowsheet Documentation  Taken 6/3/2023 1248 by Corin Romero RN  Trust Relationship/Rapport:   care explained   choices provided   reassurance provided   thoughts/feelings acknowledged   questions encouraged   questions answered   empathic listening provided   emotional support provided  Taken 6/3/2023 0843 by Corin Romero RN  Trust Relationship/Rapport:   care explained   choices provided   questions encouraged   reassurance provided   thoughts/feelings acknowledged  Goal: Readiness for Transition of Care  Outcome: Ongoing, Not Progressing   Goal Outcome Evaluation:  Plan of Care Reviewed With: patient, son           Outcome Evaluation: Patient is alert and oriented times 4, optiflow 50 L 35% oxygen with episodes of shortness of breath when active needing rest and assistance, Dr Tam saw the aflutter today at noon and son visited the patient.

## 2023-06-03 NOTE — PROGRESS NOTES
"                                              LOS: 8 days   Patient Care Team:  Niecy Galvan MD as PCP - General (Family Medicine)  Patti Navarro MD as Consulting Physician (Gastroenterology)    Chief Complaint:  F/up pneumonia, respiratory failure and ILD.    Subjective   Interval History  Remains on heated high flow 60 L 40% FiO2.  Feels the same.    REVIEW OF SYSTEMS:     GASTROINTESTINAL: No nausea or vomiting.  Mild diarrhea.  CONSTITUTIONAL: No fever or chills.     Ventilator/Non-Invasive Ventilation Settings (From admission, onward)      None                  Physical Exam:     Vital Signs  Temp:  [98.3 °F (36.8 °C)-98.6 °F (37 °C)] 98.3 °F (36.8 °C)  Heart Rate:  [] 82  Resp:  [20-24] 20  BP: (110-145)/() 125/88    Intake/Output Summary (Last 24 hours) at 6/3/2023 1144  Last data filed at 6/3/2023 1114  Gross per 24 hour   Intake 480 ml   Output 5450 ml   Net -4970 ml       Flowsheet Rows      Flowsheet Row First Filed Value   Admission Height 186.7 cm (73.5\") Documented at 05/26/2023 1339   Admission Weight 103 kg (228 lb) Documented at 05/26/2023 1339            PPE used per hospital policy    General Appearance:   Alert, cooperative, in no acute distress   ENMT:  Mallampati score 3, moist mucous membrane   Eyes:  Pupils equal and reactive to light. EOMI   Neck:   Large. Trachea midline. No thyromegaly.   Lungs:    Diffuse crackles on the right.  No wheezing.  No use of accessory muscles.    Heart:   Regular rhythm and normal rate, normal S1 and S2, no         murmur   Skin:   No rash or ecchymosis   Abdomen:    Obese. Soft. No tenderness. No HSM.   Neuro/psych:  Conscious, alert, oriented x3. Strength 5/5 in upper and lower  ext.  Appropriate mood and affect   Extremities:  No cyanosis, clubbing or edema.  Warm extremities and well-perfused          Results Review:        Results from last 7 days   Lab Units 06/03/23  0619 06/02/23  0715 06/01/23  0848   SODIUM mmol/L 139 139 " 136   POTASSIUM mmol/L 4.7 5.0 4.7   CHLORIDE mmol/L 95* 99 97*   CO2 mmol/L 37.9* 34.6* 32.0*   BUN mg/dL 55* 50* 47*   CREATININE mg/dL 1.33* 1.26 1.34*   GLUCOSE mg/dL 76 88 144*   CALCIUM mg/dL 9.3 9.6 9.3           Results from last 7 days   Lab Units 05/31/23  0456 05/29/23  0708 05/28/23  0559   WBC 10*3/mm3 15.95* 21.09* 26.75*   HEMOGLOBIN g/dL 13.2 11.7* 12.3*   HEMATOCRIT % 38.7 33.0* 35.0*   PLATELETS 10*3/mm3 459* 322 310                             Results from last 7 days   Lab Units 06/02/23  0518   PH, ARTERIAL pH units 7.340*   PCO2, ARTERIAL mm Hg 63.1*   PO2 ART mm Hg 91.1   FLOW RATE lpm 7   MODALITY  HFNC   O2 SATURATION CALC % 96.1           I reviewed the patient's new clinical results.        Medication Review:   allopurinol, 100 mg, Oral, Daily  apixaban, 5 mg, Oral, BID  Azelastine HCl, 2 spray, Each Nare, BID  bumetanide, 2 mg, Intravenous, Q12H  dilTIAZem CD, 180 mg, Oral, Q12H  ipratropium-albuterol, 3 mL, Nebulization, 4x Daily - RT  levoFLOXacin, 750 mg, Oral, Q24H  methotrexate, 2.5 mg, Oral, Weekly  montelukast, 10 mg, Oral, Nightly  predniSONE, 30 mg, Oral, Daily  rosuvastatin, 10 mg, Oral, Daily  senna-docusate sodium, 2 tablet, Oral, BID  sodium chloride, 10 mL, Intravenous, Q12H  traZODone, 100 mg, Oral, Daily            Assessment     Right pneumonia, Legionella  ILD, unspecified seems that he has mild fibrotic changes as well consistent with honeycombing but no typical UIP.  Increase in the GG infiltrates in the right lower lobe on latest CT chest 2/22/2023 compared to 7/8/2022.  S/p bronch with BAL and TBB 2/27, unrevealing with the exception of histiocytes and eosinophils on the BAL.  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  Stability confirmed at least when compared to exam done 7/8/2022..  Suspect it is reactive secondary to ILD.  S/p TB NA 2/27 -> histiocytes  Pulmonary hypertension, moderate. mPAP 34, PCWP 19-  Transpulmonary P: 15.  Consistent with group 2 and group 3.  Asthma exacerbation  Mixed obstructive and restrictive lung disease secondary to the above  Immunosuppression: On Imuran 150 mg and prednisone 10 mg daily     Atrial fibrillation  Shortness of breath, secondary to the above  Psoriasis    Plan     Reviewed CT chest.  Discussed with patient.  Extensive right-sided pneumonia with underlying ILD.  At this point despite antibiotics patient oxygenation has been worse.  I offered bronchoscopy for sampling and explained to the patient to be considered high risk due to increased oxygen requirement.  At this time he wants to wait for bronchoscopy.  Patient is immunocompromised with previously on Imuran and prednisone.  Complete Levaquin for 7 days..    Wean down oxygen to maintain sats of 90%  Also he is on heated high flow but I believe if he improves he could be transitioned to high flow cannula.  I will continue weaning down steroids as tolerated  History of lymphadenopathy which has been stable.  This was felt to be secondary to ILD.  DuoNeb 4 times a day  VTE prophylaxis: Eliquis.  Mobilize and ambulate        Casey Gutierrez MD  06/03/23  11:44 EDT          This note was dictated utilizing Dragon dictation

## 2023-06-03 NOTE — PROGRESS NOTES
[facility] Progress Note      Patient Name: Orion Casiano  : 1950  MRN: [id number]  Primary Care Physician:  Niecy Galvan MD  Date of admission: 2023    Subjective     Interval History:     Seen and examined today for follow-up on acute kidney injury.  Has been short of breath overnight requiring increased oxygenation.  Urine output of 5.1 L last 24 hours      Review of Systems:   As noted above    Objective     Vitals:   Temp:  [98.3 °F (36.8 °C)-98.6 °F (37 °C)] 98.3 °F (36.8 °C)  Heart Rate:  [] 82  Resp:  [22-24] 24  BP: (110-145)/() 125/88  Flow (L/min):  [60] 60    Intake/Output Summary (Last 24 hours) at 6/3/2023 0932  Last data filed at 6/3/2023 0651  Gross per 24 hour   Intake --   Output 5150 ml   Net -5150 ml         Physical Exam:    General Appearance: alert, oriented  no acute distress   Skin: warm and dry  HEENT: oral mucosa normal, nonicteric sclera  Neck: supple, no JVD  Lungs: CTA  Heart: RRR, normal S1 and S2  Abdomen: soft, nontender, nondistended  : no palpable bladder  Extremities: no edema, cyanosis or clubbing.      Scheduled Meds:     allopurinol, 100 mg, Oral, Daily  apixaban, 5 mg, Oral, BID  Azelastine HCl, 2 spray, Each Nare, BID  bumetanide, 2 mg, Intravenous, Q12H  dilTIAZem CD, 180 mg, Oral, Q12H  ipratropium-albuterol, 3 mL, Nebulization, 4x Daily - RT  levoFLOXacin, 750 mg, Oral, Q24H  methotrexate, 2.5 mg, Oral, Weekly  montelukast, 10 mg, Oral, Nightly  predniSONE, 30 mg, Oral, Daily  rosuvastatin, 10 mg, Oral, Daily  senna-docusate sodium, 2 tablet, Oral, BID  sodium chloride, 10 mL, Intravenous, Q12H  traZODone, 100 mg, Oral, Daily      IV Meds:        Results Reviewed:   I have personally reviewed the results from the time of this admission to 6/3/2023 09:32 EDT     Results from last 7 days   Lab Units 23  0619 23  0715 23  0848   SODIUM mmol/L 139 139 136   POTASSIUM mmol/L 4.7 5.0 4.7    CHLORIDE mmol/L 95* 99 97*   CO2 mmol/L 37.9* 34.6* 32.0*   BUN mg/dL 55* 50* 47*   CREATININE mg/dL 1.33* 1.26 1.34*   CALCIUM mg/dL 9.3 9.6 9.3   BILIRUBIN mg/dL 1.1 0.9 1.0   ALK PHOS U/L 121* 124* 147*   ALT (SGPT) U/L 65* 69* 82*   AST (SGOT) U/L 45* 55* 79*   GLUCOSE mg/dL 76 88 144*       Estimated Creatinine Clearance: 64.4 mL/min (A) (by C-G formula based on SCr of 1.33 mg/dL (H)).      Results from last 7 days   Lab Units 05/29/23  0708   URIC ACID mg/dL 7.6*       Results from last 7 days   Lab Units 05/31/23  0456 05/29/23  0708 05/28/23  0559   WBC 10*3/mm3 15.95* 21.09* 26.75*   HEMOGLOBIN g/dL 13.2 11.7* 12.3*   PLATELETS 10*3/mm3 459* 322 310             Assessment / Plan     ASSESSMENT:  1.  Acute kidney injury: Creatinine is up at 1.8 mg/dL at time of admission from baseline of 0.9 however it has been improving with latest creatinine 1.3 mg/dL that is likely due to sepsis and decreased renal perfusion in setting of renal autoregulation impairment due to lisinopril.      2.  Hyponatremia: Improving with last sodium 139  3.  Interstitial lung disease in the right Legionella pneumonia management per pulmonary.  Patient currently on steroid and Levaquin  4.  History of pulm hypertension with last pulmonary arterial pressures 34 and PCWP at 19.  5.  Immunosuppression currently on Imuran and prednisone  6.  History of atrial fibrillation  7.  Respiratory failure secondary to possible pneumonia.  Unlikely to be due to to volume overload as the patient has been in negative balance        PLAN:     1.  Continue current dose of Bumex at the time being.  2.  Surveillance labs    Thank you for involving us in the care of Orion Casiano.  Please feel free to call with any questions.    Zarina Yoder MD  06/03/23  09:32 EDT    [facility]  [phone]

## 2023-06-03 NOTE — PROGRESS NOTES
"CC: Atrial fibrillation    Interval History: Worsening respiratory status this morning.      Vital Signs  Temp:  [98.3 °F (36.8 °C)-98.6 °F (37 °C)] 98.3 °F (36.8 °C)  Heart Rate:  [] 82  Resp:  [20-24] 20  BP: (110-145)/() 125/88    Intake/Output Summary (Last 24 hours) at 6/3/2023 1151  Last data filed at 6/3/2023 1114  Gross per 24 hour   Intake 480 ml   Output 5450 ml   Net -4970 ml     Flowsheet Rows      Flowsheet Row First Filed Value   Admission Height 186.7 cm (73.5\") Documented at 05/26/2023 1339   Admission Weight 103 kg (228 lb) Documented at 05/26/2023 1339            PHYSICAL EXAM:  General: No acute distress  Resp:NL Rate, symmetric chest expansion,unlabored, clear  CV:, Irregularly irregular NL PMI, NL S1 and S2, no Murmur, no gallop, no rub, No JVD.   ABD:Nl sounds, no masses or tenderness, nondistended, no guarding or rebound  Neuro: alert,cooperative and oriented  Extr:Normal pedal pulses, No edema or cyanosis, moves all extremities      Results Review:    Results from last 7 days   Lab Units 06/03/23  0619   SODIUM mmol/L 139   POTASSIUM mmol/L 4.7   CHLORIDE mmol/L 95*   CO2 mmol/L 37.9*   BUN mg/dL 55*   CREATININE mg/dL 1.33*   GLUCOSE mg/dL 76   CALCIUM mg/dL 9.3         Results from last 7 days   Lab Units 05/31/23  0456   WBC 10*3/mm3 15.95*   HEMOGLOBIN g/dL 13.2   HEMATOCRIT % 38.7   PLATELETS 10*3/mm3 459*                     I reviewed the patient's new clinical results.  I personally viewed and interpreted the patient's EKG/Telemetry data        Medication Review:   Meds reviewed         Assessment/Plan    1.  Legionella pneumonia on antibiotics  2.  Asthma with exacerbation.  3.  Acute on chronic hypoxic respiratory failure  4.  Persistent atrial fibrillation on Eliquis  5.  Essential hypertension  6.  MURIEL on CKD nephrology following  7.  Interstitial lung disease    Slightly worsening respiratory status this morning-on high flow and satting well.  Good heart rate control " with current regimen-seems to be in flutter during exam.  Continue Eliquis and diltiazem.  Cardiology will sign off but call with any questions.    Tylor Tam MD  06/03/23  11:51 EDT

## 2023-06-03 NOTE — PLAN OF CARE
Problem: Adult Inpatient Plan of Care  Goal: Plan of Care Review  6/2/2023 1958 by Rita Thompson RN  Outcome: Ongoing, Progressing  6/2/2023 1957 by Rita Thompson RN  Outcome: Ongoing, Progressing  Flowsheets  Taken 6/2/2023 1622 by Rita Thompson RN  Plan of Care Reviewed With: patient  Taken 6/2/2023 0523 by Mitra Taylor RN  Progress: no change  6/2/2023 1949 by Rita Thompson RN  Outcome: Ongoing, Progressing  Flowsheets  Taken 6/2/2023 1622 by Rita Thompson RN  Plan of Care Reviewed With: patient  Taken 6/2/2023 0523 by Mitra Taylor RN  Progress: no change  Taken 5/31/2023 0523 by Mitra Taylor RN  Outcome Evaluation: Patient A&Ox4.  On 5 L NC.  Patient at times takes oxgen off and his stats drop into the upper 70's.  NPO since midnight.  Liver US and Liver Duplex to be performed today.  VSS.  WCTM for the rest of the shift.  Goal: Patient-Specific Goal (Individualized)  6/2/2023 1958 by Rita Thompson RN  Outcome: Ongoing, Progressing  6/2/2023 1957 by Rita Thompson RN  Outcome: Ongoing, Progressing  6/2/2023 1949 by Rita Thompson RN  Outcome: Ongoing, Progressing  Goal: Absence of Hospital-Acquired Illness or Injury  6/2/2023 1958 by Rita Thompson RN  Outcome: Ongoing, Progressing  6/2/2023 1957 by Rita Thompson RN  Outcome: Ongoing, Progressing  6/2/2023 1949 by Rita Thompson RN  Outcome: Ongoing, Progressing  Intervention: Identify and Manage Fall Risk  Recent Flowsheet Documentation  Taken 6/2/2023 1802 by Rita Thompson RN  Safety Promotion/Fall Prevention:   activity supervised   assistive device/personal items within reach   clutter free environment maintained  Taken 6/2/2023 1634 by Rita Thompson RN  Safety Promotion/Fall Prevention:   activity supervised   assistive device/personal items within reach   clutter free environment maintained  Taken 6/2/2023 1446 by Rita Thompson, RN  Safety Promotion/Fall Prevention:   activity  supervised   assistive device/personal items within reach   clutter free environment maintained  Taken 6/2/2023 1205 by Rita Thompson RN  Safety Promotion/Fall Prevention:   activity supervised   assistive device/personal items within reach   clutter free environment maintained  Taken 6/2/2023 1008 by Rita Thompson RN  Safety Promotion/Fall Prevention:   activity supervised   assistive device/personal items within reach   clutter free environment maintained  Taken 6/2/2023 0805 by Rita Thompson RN  Safety Promotion/Fall Prevention:   activity supervised   assistive device/personal items within reach   clutter free environment maintained  Intervention: Prevent Skin Injury  Recent Flowsheet Documentation  Taken 6/2/2023 1802 by Rita Thompson RN  Body Position: supine  Taken 6/2/2023 1634 by Rita Thompson RN  Body Position: position changed independently  Taken 6/2/2023 1446 by Rita Thompson RN  Body Position: weight shifting  Skin Protection: adhesive use limited  Taken 6/2/2023 1205 by Rita Thompson RN  Body Position:   turned   position changed independently   tilted  Taken 6/2/2023 1008 by Rita Thompson RN  Body Position:   turned   position changed independently   supine  Taken 6/2/2023 0805 by Rita Thompson RN  Body Position:   turned   position changed independently   supine  Skin Protection: adhesive use limited  Intervention: Prevent and Manage VTE (Venous Thromboembolism) Risk  Recent Flowsheet Documentation  Taken 6/2/2023 0805 by Rita Thompson RN  Activity Management: standing at bedside  VTE Prevention/Management: (eliquis) other (see comments)  Range of Motion: active ROM (range of motion) encouraged  Intervention: Prevent Infection  Recent Flowsheet Documentation  Taken 6/2/2023 1802 by Rita Thompson RN  Infection Prevention:   visitors restricted/screened   single patient room provided   rest/sleep promoted   personal protective equipment utilized   hand  hygiene promoted  Taken 6/2/2023 1634 by Rita Thompson RN  Infection Prevention:   single patient room provided   visitors restricted/screened   rest/sleep promoted   personal protective equipment utilized   hand hygiene promoted  Taken 6/2/2023 1446 by Rita Thompson RN  Infection Prevention:   visitors restricted/screened   single patient room provided   rest/sleep promoted   personal protective equipment utilized   hand hygiene promoted  Taken 6/2/2023 1205 by Rita Thompson RN  Infection Prevention:   visitors restricted/screened   single patient room provided   rest/sleep promoted   personal protective equipment utilized   hand hygiene promoted  Taken 6/2/2023 1008 by Rita Thompson RN  Infection Prevention:   visitors restricted/screened   single patient room provided   rest/sleep promoted   personal protective equipment utilized   hand hygiene promoted  Taken 6/2/2023 0805 by Rita Thompson RN  Infection Prevention:   visitors restricted/screened   single patient room provided   rest/sleep promoted   hand hygiene promoted   personal protective equipment utilized  Goal: Optimal Comfort and Wellbeing  6/2/2023 1958 by Rita Thompson RN  Outcome: Ongoing, Progressing  6/2/2023 1957 by Rita Thompson RN  Outcome: Ongoing, Progressing  6/2/2023 1949 by Rita Thompson RN  Outcome: Ongoing, Progressing  Intervention: Provide Person-Centered Care  Recent Flowsheet Documentation  Taken 6/2/2023 1446 by Rita Thompson RN  Trust Relationship/Rapport:   care explained   choices provided   reassurance provided   thoughts/feelings acknowledged  Taken 6/2/2023 0805 by Rita Thompson RN  Trust Relationship/Rapport:   care explained   choices provided   questions encouraged   reassurance provided   thoughts/feelings acknowledged  Goal: Readiness for Transition of Care  6/2/2023 1958 by Rita Thompson RN  Outcome: Ongoing, Progressing  6/2/2023 1957 by Rita Thompson RN  Outcome: Ongoing,  Progressing  6/2/2023 1949 by Rita Thompson RN  Outcome: Ongoing, Progressing     Problem: Adjustment to Illness (Sepsis/Septic Shock)  Goal: Optimal Coping  6/2/2023 1958 by Rita Thompson RN  Outcome: Ongoing, Progressing  6/2/2023 1957 by Rita Thompson RN  Outcome: Ongoing, Progressing  6/2/2023 1949 by Rita Thompson RN  Outcome: Ongoing, Progressing  Intervention: Optimize Psychosocial Adjustment to Illness  Recent Flowsheet Documentation  Taken 6/2/2023 1446 by Rita Thompson RN  Supportive Measures: active listening utilized  Family/Support System Care: self-care encouraged  Taken 6/2/2023 0805 by Rita Thompson RN  Supportive Measures:   active listening utilized   self-care encouraged  Family/Support System Care: self-care encouraged     Problem: Bleeding (Sepsis/Septic Shock)  Goal: Absence of Bleeding  6/2/2023 1958 by Rita Thompson RN  Outcome: Ongoing, Progressing  6/2/2023 1957 by Rita Thompson RN  Outcome: Ongoing, Progressing  6/2/2023 1949 by Rita Thompson RN  Outcome: Ongoing, Progressing  Intervention: Monitor and Manage Bleeding  Recent Flowsheet Documentation  Taken 6/2/2023 1802 by Rita Thompson RN  Bleeding Precautions: blood pressure closely monitored  Bleeding Management: affected area elevated  Taken 6/2/2023 1634 by Rita Thompson RN  Bleeding Precautions: blood pressure closely monitored  Bleeding Management: affected area elevated  Taken 6/2/2023 1205 by Rita Thompson RN  Bleeding Precautions: blood pressure closely monitored  Bleeding Management: affected area elevated  Taken 6/2/2023 1008 by Rita Thompson RN  Bleeding Precautions: blood pressure closely monitored  Bleeding Management: affected area elevated  Taken 6/2/2023 0805 by Rita Thompson RN  Bleeding Precautions: blood pressure closely monitored  Bleeding Management: affected area elevated     Problem: Glycemic Control Impaired (Sepsis/Septic Shock)  Goal: Blood Glucose Level  Within Desired Range  6/2/2023 1958 by Rita Thompson RN  Outcome: Ongoing, Progressing  6/2/2023 1957 by Rita Thompson RN  Outcome: Ongoing, Progressing  6/2/2023 1949 by Rita Thompson RN  Outcome: Ongoing, Progressing     Problem: Infection Progression (Sepsis/Septic Shock)  Goal: Absence of Infection Signs and Symptoms  6/2/2023 1958 by Rita Thompson RN  Outcome: Ongoing, Progressing  6/2/2023 1957 by Rita Thompson RN  Outcome: Ongoing, Progressing  6/2/2023 1949 by Rita Thompson RN  Outcome: Ongoing, Progressing  Intervention: Initiate Sepsis Management  Recent Flowsheet Documentation  Taken 6/2/2023 1802 by Rita Thompson RN  Stabilization Measures: legs elevated  Infection Prevention:   visitors restricted/screened   single patient room provided   rest/sleep promoted   personal protective equipment utilized   hand hygiene promoted  Isolation Precautions: precautions maintained  Taken 6/2/2023 1634 by Rita Thompson RN  Infection Prevention:   single patient room provided   visitors restricted/screened   rest/sleep promoted   personal protective equipment utilized   hand hygiene promoted  Isolation Precautions: precautions maintained  Taken 6/2/2023 1446 by Rita Thompson RN  Infection Prevention:   visitors restricted/screened   single patient room provided   rest/sleep promoted   personal protective equipment utilized   hand hygiene promoted  Isolation Precautions: precautions maintained  Taken 6/2/2023 1205 by Rita Thompson RN  Stabilization Measures: legs elevated  Infection Prevention:   visitors restricted/screened   single patient room provided   rest/sleep promoted   personal protective equipment utilized   hand hygiene promoted  Isolation Precautions: precautions maintained  Taken 6/2/2023 1008 by Rita Thompson RN  Stabilization Measures: legs elevated  Infection Prevention:   visitors restricted/screened   single patient room provided   rest/sleep promoted    personal protective equipment utilized   hand hygiene promoted  Isolation Precautions: precautions maintained  Taken 6/2/2023 0805 by Rita Thompson RN  Stabilization Measures: legs elevated  Infection Prevention:   visitors restricted/screened   single patient room provided   rest/sleep promoted   hand hygiene promoted   personal protective equipment utilized  Isolation Precautions: precautions maintained  Intervention: Promote Recovery  Recent Flowsheet Documentation  Taken 6/2/2023 1446 by Rita Thompson RN  Sleep/Rest Enhancement: awakenings minimized  Taken 6/2/2023 0805 by Rita Thompson RN  Activity Management: standing at bedside  Airway/Ventilation Support: pulmonary hygiene promoted  Sleep/Rest Enhancement:   awakenings minimized   relaxation techniques promoted   room darkened  Intervention: Promote Stabilization  Recent Flowsheet Documentation  Taken 6/2/2023 1446 by Rita Thompson RN  Fluid/Electrolyte Management: fluids adjusted  Taken 6/2/2023 0805 by Rita Thompson RN  Fluid/Electrolyte Management: fluids adjusted  Fever Reduction/Comfort Measures: lightweight clothing     Problem: Nutrition Impaired (Sepsis/Septic Shock)  Goal: Optimal Nutrition Intake  6/2/2023 1958 by Rita Thompson RN  Outcome: Ongoing, Progressing  6/2/2023 1957 by Rita Thompson RN  Outcome: Ongoing, Progressing  6/2/2023 1949 by Rita Thompson RN  Outcome: Ongoing, Progressing     Problem: Hypertension Comorbidity  Goal: Blood Pressure in Desired Range  6/2/2023 1958 by Rita Thompson RN  Outcome: Ongoing, Progressing  6/2/2023 1957 by Rita Thompson RN  Outcome: Ongoing, Progressing  6/2/2023 1949 by Rita Thompson RN  Outcome: Ongoing, Progressing  Intervention: Maintain Blood Pressure Management  Recent Flowsheet Documentation  Taken 6/2/2023 1802 by Rita Thompson RN  Medication Review/Management: medications reviewed  Taken 6/2/2023 1634 by Rita Thompson RN  Medication  Review/Management: medications reviewed  Taken 6/2/2023 1446 by Rita Thompson RN  Medication Review/Management: medications reviewed  Taken 6/2/2023 1205 by Rita Thompson RN  Medication Review/Management:   provider consulted   medications reviewed   dosing adjusted  Taken 6/2/2023 1008 by Rita Thompson RN  Medication Review/Management: medications reviewed  Taken 6/2/2023 0805 by Rita Thompson RN  Medication Review/Management: medications reviewed     Problem: Fluid Imbalance (Pneumonia)  Goal: Fluid Balance  6/2/2023 1958 by Rita Thompson RN  Outcome: Ongoing, Progressing  6/2/2023 1957 by Rita Thompson RN  Outcome: Ongoing, Progressing  6/2/2023 1949 by Rita Thompson RN  Outcome: Ongoing, Progressing  Intervention: Monitor and Manage Fluid Balance  Recent Flowsheet Documentation  Taken 6/2/2023 1446 by Rita Thompson RN  Fluid/Electrolyte Management: fluids adjusted  Taken 6/2/2023 0805 by Rita Thompson RN  Fluid/Electrolyte Management: fluids adjusted     Problem: Infection (Pneumonia)  Goal: Resolution of Infection Signs and Symptoms  6/2/2023 1958 by Rita Thompson RN  Outcome: Ongoing, Progressing  6/2/2023 1957 by Rita Thompson RN  Outcome: Ongoing, Progressing  6/2/2023 1949 by Rita Thompson RN  Outcome: Ongoing, Progressing  Intervention: Prevent Infection Progression  Recent Flowsheet Documentation  Taken 6/2/2023 1802 by Rita Thompson RN  Isolation Precautions: precautions maintained  Taken 6/2/2023 1634 by Rita Thompson RN  Isolation Precautions: precautions maintained  Taken 6/2/2023 1446 by Rita Thompson RN  Isolation Precautions: precautions maintained  Taken 6/2/2023 1205 by Rita Thompson RN  Isolation Precautions: precautions maintained  Taken 6/2/2023 1008 by Rita Thompson RN  Isolation Precautions: precautions maintained  Taken 6/2/2023 0805 by Rita Thompson RN  Fever Reduction/Comfort Measures: lightweight clothing  Isolation  Precautions: precautions maintained     Problem: Respiratory Compromise (Pneumonia)  Goal: Effective Oxygenation and Ventilation  6/2/2023 1958 by Rita Thompson RN  Outcome: Ongoing, Progressing  6/2/2023 1957 by Rita Thompson RN  Outcome: Ongoing, Progressing  6/2/2023 1949 by Rita Thompson RN  Outcome: Ongoing, Progressing  Intervention: Promote Airway Secretion Clearance  Recent Flowsheet Documentation  Taken 6/2/2023 1446 by Rita Thompson RN  Cough And Deep Breathing: done independently per patient  Taken 6/2/2023 0805 by Rita Thompson RN  Breathing Techniques/Airway Clearance: deep/controlled cough encouraged  Cough And Deep Breathing: done independently per patient  Intervention: Optimize Oxygenation and Ventilation  Recent Flowsheet Documentation  Taken 6/2/2023 1802 by Rita Thompson RN  Head of Bed (HOB) Positioning: HOB at 30 degrees  Taken 6/2/2023 1634 by Rita Thompson RN  Head of Bed (HOB) Positioning: HOB at 30 degrees  Taken 6/2/2023 1446 by Rita Thompson RN  Head of Bed (HOB) Positioning: HOB at 30 degrees  Taken 6/2/2023 1205 by Rita Thompson RN  Head of Bed (HOB) Positioning: HOB at 30-45 degrees  Taken 6/2/2023 1008 by Rita Thompson RN  Head of Bed (HOB) Positioning: HOB at 30-45 degrees  Taken 6/2/2023 0805 by Rita Thompson RN  Head of Bed (HOB) Positioning: HOB at 30 degrees  Airway/Ventilation Management: pulmonary hygiene promoted   Goal Outcome Evaluation:  Plan of Care Reviewed With: patient

## 2023-06-04 LAB
ALBUMIN SERPL-MCNC: 2.8 G/DL (ref 3.5–5.2)
ALBUMIN/GLOB SERPL: 0.8 G/DL
ALP SERPL-CCNC: 117 U/L (ref 39–117)
ALT SERPL W P-5'-P-CCNC: 49 U/L (ref 1–41)
ANION GAP SERPL CALCULATED.3IONS-SCNC: 9 MMOL/L (ref 5–15)
AST SERPL-CCNC: 38 U/L (ref 1–40)
BILIRUB SERPL-MCNC: 1.1 MG/DL (ref 0–1.2)
BUN SERPL-MCNC: 54 MG/DL (ref 8–23)
BUN/CREAT SERPL: 35.3 (ref 7–25)
CALCIUM SPEC-SCNC: 9 MG/DL (ref 8.6–10.5)
CHLORIDE SERPL-SCNC: 92 MMOL/L (ref 98–107)
CO2 SERPL-SCNC: 35 MMOL/L (ref 22–29)
CREAT SERPL-MCNC: 1.53 MG/DL (ref 0.76–1.27)
EGFRCR SERPLBLD CKD-EPI 2021: 48 ML/MIN/1.73
GLOBULIN UR ELPH-MCNC: 3.5 GM/DL
GLUCOSE SERPL-MCNC: 74 MG/DL (ref 65–99)
PHOSPHATE SERPL-MCNC: 2.9 MG/DL (ref 2.5–4.5)
POTASSIUM SERPL-SCNC: 4.3 MMOL/L (ref 3.5–5.2)
PROT SERPL-MCNC: 6.3 G/DL (ref 6–8.5)
SODIUM SERPL-SCNC: 136 MMOL/L (ref 136–145)

## 2023-06-04 PROCEDURE — 94760 N-INVAS EAR/PLS OXIMETRY 1: CPT

## 2023-06-04 PROCEDURE — 94664 DEMO&/EVAL PT USE INHALER: CPT

## 2023-06-04 PROCEDURE — 63710000001 PREDNISONE PER 1 MG: Performed by: INTERNAL MEDICINE

## 2023-06-04 PROCEDURE — 84100 ASSAY OF PHOSPHORUS: CPT | Performed by: HOSPITALIST

## 2023-06-04 PROCEDURE — 94761 N-INVAS EAR/PLS OXIMETRY MLT: CPT

## 2023-06-04 PROCEDURE — 94799 UNLISTED PULMONARY SVC/PX: CPT

## 2023-06-04 PROCEDURE — 63710000001 PREDNISONE PER 5 MG: Performed by: INTERNAL MEDICINE

## 2023-06-04 PROCEDURE — 80053 COMPREHEN METABOLIC PANEL: CPT | Performed by: INTERNAL MEDICINE

## 2023-06-04 RX ORDER — BUMETANIDE 0.25 MG/ML
2 INJECTION INTRAMUSCULAR; INTRAVENOUS DAILY
Status: DISCONTINUED | OUTPATIENT
Start: 2023-06-04 | End: 2023-06-05

## 2023-06-04 RX ADMIN — ALLOPURINOL 100 MG: 100 TABLET ORAL at 09:03

## 2023-06-04 RX ADMIN — DILTIAZEM HYDROCHLORIDE 180 MG: 180 CAPSULE, COATED, EXTENDED RELEASE ORAL at 09:03

## 2023-06-04 RX ADMIN — TRAZODONE HYDROCHLORIDE 100 MG: 100 TABLET ORAL at 09:03

## 2023-06-04 RX ADMIN — DILTIAZEM HYDROCHLORIDE 180 MG: 180 CAPSULE, COATED, EXTENDED RELEASE ORAL at 20:15

## 2023-06-04 RX ADMIN — APIXABAN 5 MG: 5 TABLET, FILM COATED ORAL at 09:03

## 2023-06-04 RX ADMIN — ROSUVASTATIN CALCIUM 10 MG: 10 TABLET, FILM COATED ORAL at 09:03

## 2023-06-04 RX ADMIN — IPRATROPIUM BROMIDE AND ALBUTEROL SULFATE 3 ML: 2.5; .5 SOLUTION RESPIRATORY (INHALATION) at 11:48

## 2023-06-04 RX ADMIN — Medication 10 ML: at 20:16

## 2023-06-04 RX ADMIN — AZELASTINE HYDROCHLORIDE 274 MCG: 137 SPRAY, METERED NASAL at 09:04

## 2023-06-04 RX ADMIN — APIXABAN 5 MG: 5 TABLET, FILM COATED ORAL at 20:18

## 2023-06-04 RX ADMIN — LEVOFLOXACIN 750 MG: 750 TABLET, FILM COATED ORAL at 06:22

## 2023-06-04 RX ADMIN — MONTELUKAST SODIUM 10 MG: 10 TABLET, FILM COATED ORAL at 20:15

## 2023-06-04 RX ADMIN — Medication 10 ML: at 09:04

## 2023-06-04 RX ADMIN — PREDNISONE 30 MG: 10 TABLET ORAL at 09:03

## 2023-06-04 RX ADMIN — BUMETANIDE 2 MG: 0.25 INJECTION INTRAMUSCULAR; INTRAVENOUS at 15:21

## 2023-06-04 RX ADMIN — IPRATROPIUM BROMIDE AND ALBUTEROL SULFATE 3 ML: 2.5; .5 SOLUTION RESPIRATORY (INHALATION) at 15:20

## 2023-06-04 RX ADMIN — IPRATROPIUM BROMIDE AND ALBUTEROL SULFATE 3 ML: 2.5; .5 SOLUTION RESPIRATORY (INHALATION) at 08:33

## 2023-06-04 NOTE — PLAN OF CARE
Problem: Adult Inpatient Plan of Care  Goal: Plan of Care Review  Outcome: Ongoing, Progressing  Flowsheets (Taken 6/4/2023 1507)  Plan of Care Reviewed With: patient  Outcome Evaluation: Patient remains alert and oriented times 4, blood pressure this afternoon decreased so held bumex and will recheck blood pressure. No report of pain and he is complying with safety issues.  Goal: Absence of Hospital-Acquired Illness or Injury  Outcome: Ongoing, Progressing  Intervention: Identify and Manage Fall Risk  Recent Flowsheet Documentation  Taken 6/4/2023 1432 by Corin Romero RN  Safety Promotion/Fall Prevention:   toileting scheduled   safety round/check completed   room organization consistent   nonskid shoes/slippers when out of bed   fall prevention program maintained   lighting adjusted   clutter free environment maintained  Taken 6/4/2023 1356 by Corin Romero RN  Safety Promotion/Fall Prevention:   toileting scheduled   safety round/check completed   room organization consistent   nonskid shoes/slippers when out of bed   lighting adjusted   fall prevention program maintained  Taken 6/4/2023 1235 by Corin Romero RN  Safety Promotion/Fall Prevention:   toileting scheduled   safety round/check completed   room organization consistent   nonskid shoes/slippers when out of bed   lighting adjusted   fall prevention program maintained  Taken 6/4/2023 1050 by Corin Romero RN  Safety Promotion/Fall Prevention:   toileting scheduled   safety round/check completed   room organization consistent   nonskid shoes/slippers when out of bed   lighting adjusted   fall prevention program maintained  Taken 6/4/2023 0858 by Corin Romero RN  Safety Promotion/Fall Prevention:   toileting scheduled   safety round/check completed   room organization consistent   nonskid shoes/slippers when out of bed   lighting adjusted   fall prevention program maintained   activity supervised  Intervention: Prevent Skin Injury  Recent Flowsheet  Documentation  Taken 6/4/2023 1432 by Corin Romero RN  Body Position: position changed independently  Taken 6/4/2023 1356 by Corin Romero RN  Body Position: position changed independently  Skin Protection:   adhesive use limited   skin-to-skin areas padded  Taken 6/4/2023 1235 by Corin Romero RN  Body Position: position changed independently  Taken 6/4/2023 1050 by Corin Romero RN  Body Position: position changed independently  Taken 6/4/2023 0858 by Corin Roemro RN  Body Position: position changed independently  Skin Protection: adhesive use limited  Intervention: Prevent and Manage VTE (Venous Thromboembolism) Risk  Recent Flowsheet Documentation  Taken 6/4/2023 1432 by Corin Romero RN  Activity Management: activity encouraged  Taken 6/4/2023 1356 by Corin Romero RN  Activity Management: activity encouraged  Taken 6/4/2023 1235 by Corin Romero RN  Activity Management: activity encouraged  Taken 6/4/2023 1050 by Corin Romero RN  Activity Management: activity encouraged  Taken 6/4/2023 0858 by Corin Romero RN  Activity Management: activity encouraged  VTE Prevention/Management: (patient is taking eliquis) other (see comments)  Range of Motion:   active ROM (range of motion) encouraged   ROM (range of motion) performed  Intervention: Prevent Infection  Recent Flowsheet Documentation  Taken 6/4/2023 1432 by Corin Romero RN  Infection Prevention:   environmental surveillance performed   hand hygiene promoted   single patient room provided  Taken 6/4/2023 1356 by Coirn Romero RN  Infection Prevention:   environmental surveillance performed   hand hygiene promoted   single patient room provided  Taken 6/4/2023 1235 by Corin Romero RN  Infection Prevention:   environmental surveillance performed   hand hygiene promoted   single patient room provided  Taken 6/4/2023 1050 by Corin Romero RN  Infection Prevention:   environmental surveillance performed   hand hygiene promoted   single patient room provided  Taken 6/4/2023  0858 by Heather, Corin, RN  Infection Prevention:   environmental surveillance performed   hand hygiene promoted   single patient room provided  Goal: Optimal Comfort and Wellbeing  Outcome: Ongoing, Progressing  Intervention: Provide Person-Centered Care  Recent Flowsheet Documentation  Taken 6/4/2023 1356 by Corin Romero RN  Trust Relationship/Rapport:   care explained   choices provided   reassurance provided   thoughts/feelings acknowledged  Taken 6/4/2023 0858 by Corin Romero RN  Trust Relationship/Rapport:   care explained   choices provided   questions encouraged   reassurance provided  Goal: Readiness for Transition of Care  Outcome: Ongoing, Progressing   Goal Outcome Evaluation:  Plan of Care Reviewed With: patient           Outcome Evaluation: Patient remains alert and oriented times 4, blood pressure this afternoon decreased so held bumex and will recheck blood pressure. No report of pain and he is complying with safety issues.

## 2023-06-04 NOTE — PLAN OF CARE
Goal Outcome Evaluation:  Plan of Care Reviewed With: patient        Progress: no change  Outcome Evaluation: A&O. Still with Optiflow. Settings per respiratory at 45L 66%. Voiding per urinal. Bumex d/c'd for now d/t to low BP per nephrology. Up with 1 in room. No complaints of pain. Pt very demanding at times. WCTM

## 2023-06-04 NOTE — PROGRESS NOTES
"                                              LOS: 9 days   Patient Care Team:  Niecy Galvan MD as PCP - General (Family Medicine)  Patti Navarro MD as Consulting Physician (Gastroenterology)    Chief Complaint:  F/up pneumonia, respiratory failure and ILD.    Subjective   Remains on heated high flow now down to 45 L at 60%.  I weaned him down to 50% FiO2 and he is tolerating well.  Overall feels better.    REVIEW OF SYSTEMS:     GASTROINTESTINAL: No nausea or vomiting.  Mild diarrhea.  CONSTITUTIONAL: No fever or chills.     Ventilator/Non-Invasive Ventilation Settings (From admission, onward)      None                  Physical Exam:     Vital Signs  Temp:  [97.4 °F (36.3 °C)-98.2 °F (36.8 °C)] 97.4 °F (36.3 °C)  Heart Rate:  [68-95] 79  Resp:  [20-25] 20  BP: ()/(69-91) 132/87    Intake/Output Summary (Last 24 hours) at 6/4/2023 1128  Last data filed at 6/4/2023 1055  Gross per 24 hour   Intake 1080 ml   Output 2645 ml   Net -1565 ml       Flowsheet Rows      Flowsheet Row First Filed Value   Admission Height 186.7 cm (73.5\") Documented at 05/26/2023 1339   Admission Weight 103 kg (228 lb) Documented at 05/26/2023 1339            PPE used per hospital policy    General Appearance:   Alert, cooperative, in no acute distress   ENMT:  Mallampati score 3, moist mucous membrane   Eyes:  Pupils equal and reactive to light. EOMI   Neck:   Large. Trachea midline. No thyromegaly.   Lungs:    Diffuse crackles on the right.  No wheezing.  No use of accessory muscles.    Heart:   Regular rhythm and normal rate, normal S1 and S2, no         murmur   Skin:   No rash or ecchymosis   Abdomen:    Obese. Soft. No tenderness. No HSM.   Neuro/psych:  Conscious, alert, oriented x3. Strength 5/5 in upper and lower  ext.  Appropriate mood and affect   Extremities:  No cyanosis, clubbing or edema.  Warm extremities and well-perfused          Results Review:        Results from last 7 days   Lab Units 06/04/23  0605 " 06/03/23  0619 06/02/23  0715   SODIUM mmol/L 136 139 139   POTASSIUM mmol/L 4.3 4.7 5.0   CHLORIDE mmol/L 92* 95* 99   CO2 mmol/L 35.0* 37.9* 34.6*   BUN mg/dL 54* 55* 50*   CREATININE mg/dL 1.53* 1.33* 1.26   GLUCOSE mg/dL 74 76 88   CALCIUM mg/dL 9.0 9.3 9.6           Results from last 7 days   Lab Units 05/31/23  0456 05/29/23  0708   WBC 10*3/mm3 15.95* 21.09*   HEMOGLOBIN g/dL 13.2 11.7*   HEMATOCRIT % 38.7 33.0*   PLATELETS 10*3/mm3 459* 322                             Results from last 7 days   Lab Units 06/02/23  0518   PH, ARTERIAL pH units 7.340*   PCO2, ARTERIAL mm Hg 63.1*   PO2 ART mm Hg 91.1   FLOW RATE lpm 7   MODALITY  HFNC   O2 SATURATION CALC % 96.1           I reviewed the patient's new clinical results.        Medication Review:   allopurinol, 100 mg, Oral, Daily  apixaban, 5 mg, Oral, BID  Azelastine HCl, 2 spray, Each Nare, BID  dilTIAZem CD, 180 mg, Oral, Q12H  ipratropium-albuterol, 3 mL, Nebulization, 4x Daily - RT  levoFLOXacin, 750 mg, Oral, Q24H  methotrexate, 2.5 mg, Oral, Weekly  montelukast, 10 mg, Oral, Nightly  predniSONE, 30 mg, Oral, Daily  rosuvastatin, 10 mg, Oral, Daily  senna-docusate sodium, 2 tablet, Oral, BID  sodium chloride, 10 mL, Intravenous, Q12H  traZODone, 100 mg, Oral, Daily            Assessment     Right pneumonia, Legionella  ILD, unspecified seems that he has mild fibrotic changes as well consistent with honeycombing but no typical UIP.  Increase in the GG infiltrates in the right lower lobe on latest CT chest 2/22/2023 compared to 7/8/2022.  S/p bronch with BAL and TBB 2/27, unrevealing with the exception of histiocytes and eosinophils on the BAL.  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  Stability confirmed at least when compared to exam done 7/8/2022..  Suspect it is reactive secondary to ILD.  S/p TB NA 2/27 -> histiocytes  Pulmonary hypertension, moderate. mPAP 34, PCWP 19- Transpulmonary P: 15.   Consistent with group 2 and group 3.  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  We will go ahead and attempt to transition him to high flow nasal cannula and see if he can tolerate.  Maintain sats above 90%.    Extensive right-sided pneumonia with underlying ILD.  At this point despite antibiotics patient oxygenation has been worse.  I offered bronchoscopy for sampling and explained to the patient to be considered high risk due to increased oxygen requirement.  At this time he wants to wait for bronchoscopy.  Patient is immunocompromised with previously on Imuran and prednisone.  Complete Levaquin for 7 days..    Wean down oxygen to maintain sats of 90%  Also he is on heated high flow but I believe if he improves he could be transitioned to high flow cannula.  I will continue weaning down steroids as tolerated  History of lymphadenopathy which has been stable.  This was felt to be secondary to ILD.  DuoNeb 4 times a day  VTE prophylaxis: Eliquis.  Mobilize and ambulate        Casey Gutierrez MD  06/04/23  11:28 EDT          This note was dictated utilizing Dragon dictation

## 2023-06-04 NOTE — PROGRESS NOTES
[facility] Progress Note      Patient Name: Orion Casiano  : 1950  MRN: [id number]  Primary Care Physician:  Niecy Galvan MD  Date of admission: 2023    Subjective     Interval History:     Seen and examined today for follow-up on acute kidney injury.  Continues to require high flow oxygen.  Feeling less short of breath.  Urine output of 2.6 L / 24 hours  Denies nausea or vomiting.  No fever or chills.          Review of Systems:   As noted above    Objective     Vitals:   Temp:  [97.4 °F (36.3 °C)-98.2 °F (36.8 °C)] 97.4 °F (36.3 °C)  Heart Rate:  [68-95] 68  Resp:  [18-25] 18  BP: ()/(69-87) 132/87  Flow (L/min):  [8-50] 8    Intake/Output Summary (Last 24 hours) at 2023 1204  Last data filed at 2023 1055  Gross per 24 hour   Intake 1080 ml   Output 2445 ml   Net -1365 ml         Physical Exam:    General Appearance: alert, oriented on high flow oxygen by nasal cannula.  Pleasant not in distress.  Skin: warm and dry  HEENT: oral mucosa normal, nonicteric sclera  Neck: supple, no JVD  Lungs: CTA  Heart: RRR, normal S1 and S2  Abdomen: soft, nontender, nondistended  : no palpable bladder  Extremities: no edema, cyanosis or clubbing.      Scheduled Meds:     allopurinol, 100 mg, Oral, Daily  apixaban, 5 mg, Oral, BID  Azelastine HCl, 2 spray, Each Nare, BID  dilTIAZem CD, 180 mg, Oral, Q12H  ipratropium-albuterol, 3 mL, Nebulization, 4x Daily - RT  levoFLOXacin, 750 mg, Oral, Q24H  methotrexate, 2.5 mg, Oral, Weekly  montelukast, 10 mg, Oral, Nightly  predniSONE, 30 mg, Oral, Daily  rosuvastatin, 10 mg, Oral, Daily  senna-docusate sodium, 2 tablet, Oral, BID  sodium chloride, 10 mL, Intravenous, Q12H  traZODone, 100 mg, Oral, Daily      IV Meds:        Results Reviewed:   I have personally reviewed the results from the time of this admission to 2023 12:04 EDT     Results from last 7 days   Lab Units 23  0605 23  0619  06/02/23  0715   SODIUM mmol/L 136 139 139   POTASSIUM mmol/L 4.3 4.7 5.0   CHLORIDE mmol/L 92* 95* 99   CO2 mmol/L 35.0* 37.9* 34.6*   BUN mg/dL 54* 55* 50*   CREATININE mg/dL 1.53* 1.33* 1.26   CALCIUM mg/dL 9.0 9.3 9.6   BILIRUBIN mg/dL 1.1 1.1 0.9   ALK PHOS U/L 117 121* 124*   ALT (SGPT) U/L 49* 65* 69*   AST (SGOT) U/L 38 45* 55*   GLUCOSE mg/dL 74 76 88       Estimated Creatinine Clearance: 56 mL/min (A) (by C-G formula based on SCr of 1.53 mg/dL (H)).  Results from last 7 days   Lab Units 06/04/23  0605   PHOSPHORUS mg/dL 2.9     Results from last 7 days   Lab Units 05/29/23  0708   URIC ACID mg/dL 7.6*       Results from last 7 days   Lab Units 05/31/23  0456 05/29/23  0708   WBC 10*3/mm3 15.95* 21.09*   HEMOGLOBIN g/dL 13.2 11.7*   PLATELETS 10*3/mm3 459* 322             Assessment / Plan     ASSESSMENT:  1.  Acute kidney injury: Creatinine is up at 1.8 mg/dL at time of admission from baseline of 0.9.  Creatinine improved down to 1.3 and now up to 1.5 again.  Etiology likely secondary to pneumonia and diuresis.     2.  Hyponatremia: Improving with last sodium 136  3.  Interstitial lung disease now complicated by Legionella pneumonia management per pulmonary.  Patient currently on steroid and Levaquin  4.  History of pulm hypertension with last pulmonary arterial pressures 34 and PCWP at 19.  5.  Immunosuppression currently on Imuran and prednisone  6.  History of atrial fibrillation  7.  Respiratory failure secondary to possible pneumonia.  Unlikely to be due to to volume overload as the patient has been in negative balance        PLAN:     1.  The patient volume status seems to be appropriate.  2.  Given worsening kidney function we we will decrease Bumex to 2 mg once daily.  3.  Labs in a.m.    Thank you for involving us in the care of Orion Casiano.  Please feel free to call with any questions.    Zarina Yoder MD  06/04/23  12:04 EDT    [facility]  [phone]              cirilo

## 2023-06-04 NOTE — PROGRESS NOTES
Name: Orion Casiano ADMIT: 2023   : 1950  PCP: Niecy Galvan MD    MRN: [id number] LOS: 9 days   AGE/SEX: 72 y.o. male  ROOM: [facility]     Subjective   Subjective   He continues to report no acute complaints today.  Does not feel short of breath though continues to have pretty high oxygen requirements.    Objective   Objective   Vital Signs  Temp:  [97.4 °F (36.3 °C)-98.2 °F (36.8 °C)] 97.4 °F (36.3 °C)  Heart Rate:  [68-95] 79  Resp:  [20-25] 20  BP: ()/(69-91) 132/87  SpO2:  [88 %-99 %] 97 %  on  Flow (L/min):  [45-50] 45;   Device (Oxygen Therapy): heated;high-flow mask  Body mass index is 30.05 kg/m².  Physical Exam  Constitutional:       General: He is not in acute distress.     Appearance: Normal appearance. He is ill-appearing and toxic-appearing.   Cardiovascular:      Rate and Rhythm: Normal rate and regular rhythm.   Pulmonary:      Effort: Pulmonary effort is normal.      Breath sounds: Rales present. No wheezing or rhonchi.      Comments: Expiratory wheezing L > R  Chest:      Chest wall: No tenderness.   Abdominal:      General: Abdomen is flat. There is no distension.      Tenderness: There is no abdominal tenderness.   Skin:     Comments: Xerosis   Neurological:      General: No focal deficit present.      Mental Status: He is alert and oriented to person, place, and time.       Results Review     I reviewed the patient's new clinical results.  Results from last 7 days   Lab Units 23  0456 23  0708   WBC 10*3/mm3 15.95* 21.09*   HEMOGLOBIN g/dL 13.2 11.7*   PLATELETS 10*3/mm3 459* 322     Results from last 7 days   Lab Units 23  0605 23  0619 23  0715 23  0848   SODIUM mmol/L 136 139 139 136   POTASSIUM mmol/L 4.3 4.7 5.0 4.7   CHLORIDE mmol/L 92* 95* 99 97*   CO2 mmol/L 35.0* 37.9* 34.6* 32.0*   BUN mg/dL 54* 55* 50* 47*   CREATININE mg/dL 1.53* 1.33* 1.26 1.34*   GLUCOSE mg/dL 74 76 88 144*   EGFR mL/min/1.73 48.0* 56.8* 60.6  56.3*     Results from last 7 days   Lab Units 06/04/23  0605 06/03/23  0619 06/02/23  0715 06/01/23  0848   ALBUMIN g/dL 2.8* 2.6* 2.8* 3.1*   BILIRUBIN mg/dL 1.1 1.1 0.9 1.0   ALK PHOS U/L 117 121* 124* 147*   AST (SGOT) U/L 38 45* 55* 79*   ALT (SGPT) U/L 49* 65* 69* 82*     Results from last 7 days   Lab Units 06/04/23  0605 06/03/23  0619 06/02/23  0715 06/01/23  0848   CALCIUM mg/dL 9.0 9.3 9.6 9.3   ALBUMIN g/dL 2.8* 2.6* 2.8* 3.1*   PHOSPHORUS mg/dL 2.9  --   --   --            No results found for: HGBA1C, POCGLU    CT Chest Without Contrast Diagnostic    Result Date: 6/2/2023   1.  New patchy consolidation in the right lower lobe concerning for pneumonia. Findings are superimposed on chronic fibrotic changes, right greater than left, which have progressed. Recommend short-term follow-up CT chest in 3 months. 2.  Lymphadenopathy has overall slightly improved. Attention on follow-up imaging is recommended.   This report was finalized on 6/2/2023 3:28 PM by Dr. Caryn Frost M.D.       Scheduled Medications  allopurinol, 100 mg, Oral, Daily  apixaban, 5 mg, Oral, BID  Azelastine HCl, 2 spray, Each Nare, BID  dilTIAZem CD, 180 mg, Oral, Q12H  ipratropium-albuterol, 3 mL, Nebulization, 4x Daily - RT  levoFLOXacin, 750 mg, Oral, Q24H  methotrexate, 2.5 mg, Oral, Weekly  montelukast, 10 mg, Oral, Nightly  predniSONE, 30 mg, Oral, Daily  rosuvastatin, 10 mg, Oral, Daily  senna-docusate sodium, 2 tablet, Oral, BID  sodium chloride, 10 mL, Intravenous, Q12H  traZODone, 100 mg, Oral, Daily    Infusions   Diet  Diet: Renal Diets; Low Sodium (2-3g), Low Potassium, Low Phosphorus; Texture: Regular Texture (IDDSI 7); Fluid Consistency: Thin (IDDSI 0)       Assessment/Plan     Active Hospital Problems    Diagnosis  POA    **Healthcare-associated pneumonia [J18.9]  Yes    A-fib [I48.91]  Unknown    MURIEL (acute kidney injury) [N17.9]  Unknown    Acute respiratory failure with hypoxia [J96.01]  Yes    Chronic interstitial  lung disease [J84.9]  Yes    Primary hypertension [I10]  Yes    Asthma [J45.909]  Yes      Resolved Hospital Problems   No resolved problems to display.       72 y.o. male admitted with Healthcare-associated pneumonia.      06/04/23  Appreciate pulmonology assistance.  Continue weaning oxygen.  Final day of antibiotics tomorrow.    Acute on chronic hypoxic respiratory failure  Legionella pneumonia  ILD (2L home O2)   -still requiring 4-5L NC  -DuoNebs, prednisone  -CT chest (6-23): New patchy consolidation in RLL concerning for PNA; repeat chest CT in 3 months recommended (around 9/2/2023)  -Pulmonology following  -cont Levaquin 7 days    MURIEL  -Crt 1.83 OA (b/l ~0.9) > 1.3 (6/1) > 1.26 > 1.33 > 1.53  -Nephrology following  -Bumex 2 mg twice daily    A-fib  -RC: Diltiazem 180 mg twice daily  - AC: Apixaban    Gout  -Allopurinol 100 mg    Transaminitis  -GI following  -US w/ hepatomegaly  -duplex WNL      Eliquis (home med) for DVT prophylaxis.  Discussed with patient and care team on multidisciplinary rounds.  Anticipate discharge home when cleared by consultants      Jerod Ram MD  [facility]  06/04/23  11:28 EDT

## 2023-06-05 LAB
ALBUMIN SERPL-MCNC: 2.6 G/DL (ref 3.5–5.2)
ALBUMIN/GLOB SERPL: 0.8 G/DL
ALP SERPL-CCNC: 100 U/L (ref 39–117)
ALT SERPL W P-5'-P-CCNC: 46 U/L (ref 1–41)
ANION GAP SERPL CALCULATED.3IONS-SCNC: 5.9 MMOL/L (ref 5–15)
AST SERPL-CCNC: 29 U/L (ref 1–40)
BACTERIA SPEC RESP CULT: NORMAL
BILIRUB SERPL-MCNC: 1.1 MG/DL (ref 0–1.2)
BUN SERPL-MCNC: 49 MG/DL (ref 8–23)
BUN/CREAT SERPL: 32 (ref 7–25)
CALCIUM SPEC-SCNC: 8.6 MG/DL (ref 8.6–10.5)
CHLORIDE SERPL-SCNC: 92 MMOL/L (ref 98–107)
CO2 SERPL-SCNC: 34.1 MMOL/L (ref 22–29)
CREAT SERPL-MCNC: 1.53 MG/DL (ref 0.76–1.27)
EGFRCR SERPLBLD CKD-EPI 2021: 48 ML/MIN/1.73
GLOBULIN UR ELPH-MCNC: 3.1 GM/DL
GLUCOSE SERPL-MCNC: 97 MG/DL (ref 65–99)
GRAM STN SPEC: NORMAL
PHOSPHATE SERPL-MCNC: 3.2 MG/DL (ref 2.5–4.5)
POTASSIUM SERPL-SCNC: 4.2 MMOL/L (ref 3.5–5.2)
PROT SERPL-MCNC: 5.7 G/DL (ref 6–8.5)
SODIUM SERPL-SCNC: 132 MMOL/L (ref 136–145)

## 2023-06-05 PROCEDURE — 94760 N-INVAS EAR/PLS OXIMETRY 1: CPT

## 2023-06-05 PROCEDURE — 94799 UNLISTED PULMONARY SVC/PX: CPT

## 2023-06-05 PROCEDURE — 80053 COMPREHEN METABOLIC PANEL: CPT | Performed by: INTERNAL MEDICINE

## 2023-06-05 PROCEDURE — 84100 ASSAY OF PHOSPHORUS: CPT | Performed by: HOSPITALIST

## 2023-06-05 PROCEDURE — 63710000001 PREDNISONE PER 5 MG: Performed by: INTERNAL MEDICINE

## 2023-06-05 PROCEDURE — 94664 DEMO&/EVAL PT USE INHALER: CPT

## 2023-06-05 PROCEDURE — 94761 N-INVAS EAR/PLS OXIMETRY MLT: CPT

## 2023-06-05 PROCEDURE — 63710000001 PREDNISONE PER 1 MG: Performed by: INTERNAL MEDICINE

## 2023-06-05 RX ORDER — FUROSEMIDE 40 MG/1
40 TABLET ORAL DAILY
Status: DISCONTINUED | OUTPATIENT
Start: 2023-06-05 | End: 2023-06-06 | Stop reason: HOSPADM

## 2023-06-05 RX ADMIN — Medication 10 ML: at 08:12

## 2023-06-05 RX ADMIN — PREDNISONE 30 MG: 10 TABLET ORAL at 08:07

## 2023-06-05 RX ADMIN — FUROSEMIDE 40 MG: 40 TABLET ORAL at 11:58

## 2023-06-05 RX ADMIN — ALLOPURINOL 100 MG: 100 TABLET ORAL at 10:53

## 2023-06-05 RX ADMIN — IPRATROPIUM BROMIDE AND ALBUTEROL SULFATE 3 ML: 2.5; .5 SOLUTION RESPIRATORY (INHALATION) at 07:11

## 2023-06-05 RX ADMIN — APIXABAN 5 MG: 5 TABLET, FILM COATED ORAL at 08:07

## 2023-06-05 RX ADMIN — IPRATROPIUM BROMIDE AND ALBUTEROL SULFATE 3 ML: 2.5; .5 SOLUTION RESPIRATORY (INHALATION) at 11:07

## 2023-06-05 RX ADMIN — ROSUVASTATIN CALCIUM 10 MG: 10 TABLET, FILM COATED ORAL at 08:07

## 2023-06-05 RX ADMIN — IPRATROPIUM BROMIDE AND ALBUTEROL SULFATE 3 ML: 2.5; .5 SOLUTION RESPIRATORY (INHALATION) at 19:48

## 2023-06-05 RX ADMIN — DILTIAZEM HYDROCHLORIDE 180 MG: 180 CAPSULE, COATED, EXTENDED RELEASE ORAL at 08:07

## 2023-06-05 RX ADMIN — AZELASTINE HYDROCHLORIDE 274 MCG: 137 SPRAY, METERED NASAL at 20:37

## 2023-06-05 RX ADMIN — TRAZODONE HYDROCHLORIDE 100 MG: 100 TABLET ORAL at 08:07

## 2023-06-05 RX ADMIN — MONTELUKAST SODIUM 10 MG: 10 TABLET, FILM COATED ORAL at 20:36

## 2023-06-05 RX ADMIN — DILTIAZEM HYDROCHLORIDE 180 MG: 180 CAPSULE, COATED, EXTENDED RELEASE ORAL at 20:36

## 2023-06-05 RX ADMIN — APIXABAN 5 MG: 5 TABLET, FILM COATED ORAL at 20:37

## 2023-06-05 RX ADMIN — Medication 10 ML: at 20:38

## 2023-06-05 RX ADMIN — IPRATROPIUM BROMIDE AND ALBUTEROL SULFATE 3 ML: 2.5; .5 SOLUTION RESPIRATORY (INHALATION) at 15:21

## 2023-06-05 RX ADMIN — AZELASTINE HYDROCHLORIDE 274 MCG: 137 SPRAY, METERED NASAL at 08:07

## 2023-06-05 RX ADMIN — DOCUSATE SODIUM 50MG AND SENNOSIDES 8.6MG 2 TABLET: 8.6; 5 TABLET, FILM COATED ORAL at 08:07

## 2023-06-05 RX ADMIN — LEVOFLOXACIN 750 MG: 750 TABLET, FILM COATED ORAL at 10:53

## 2023-06-05 RX ADMIN — BUMETANIDE 2 MG: 0.25 INJECTION INTRAMUSCULAR; INTRAVENOUS at 08:07

## 2023-06-05 NOTE — PLAN OF CARE
Goal Outcome Evaluation:  Plan of Care Reviewed With: patient        Progress: improving   VSS. Alert. 7L high flow. No issues throughout the night. Will CTM

## 2023-06-05 NOTE — PROGRESS NOTES
[facility] Progress Note      Patient Name: Orion Casiano  : 1950  MRN: [id number]  Primary Care Physician:  Niecy Galvan MD  Date of admission: 2023    Subjective     Interval History:     Seen and examined today for follow-up on acute kidney injury.  No acute events overnight.  Output 2975 mL.  Denies nausea or vomiting.  No fever or chills.          Review of Systems:   As noted above    Objective     Vitals:   Temp:  [97.2 °F (36.2 °C)-97.9 °F (36.6 °C)] 97.2 °F (36.2 °C)  Heart Rate:  [59-72] 64  Resp:  [18-20] 18  BP: ()/(58-85) 121/85  Flow (L/min):  [5-8] 5    Intake/Output Summary (Last 24 hours) at 2023 0942  Last data filed at 2023 0732  Gross per 24 hour   Intake 360 ml   Output 2975 ml   Net -2615 ml         Physical Exam:    General Appearance: alert, oriented on high flow oxygen by nasal cannula.  Pleasant not in distress.  Skin: warm and dry  HEENT: oral mucosa normal, nonicteric sclera  Neck: supple, no JVD  Lungs: Decreased breath sounds  Heart: RRR, normal S1 and S2  Abdomen: soft, nontender, nondistended  : no palpable bladder  Extremities: no edema, cyanosis or clubbing.      Scheduled Meds:     allopurinol, 100 mg, Oral, Daily  apixaban, 5 mg, Oral, BID  Azelastine HCl, 2 spray, Each Nare, BID  bumetanide, 2 mg, Intravenous, Daily  dilTIAZem CD, 180 mg, Oral, Q12H  ipratropium-albuterol, 3 mL, Nebulization, 4x Daily - RT  levoFLOXacin, 750 mg, Oral, Q24H  methotrexate, 2.5 mg, Oral, Weekly  montelukast, 10 mg, Oral, Nightly  predniSONE, 30 mg, Oral, Daily  rosuvastatin, 10 mg, Oral, Daily  senna-docusate sodium, 2 tablet, Oral, BID  sodium chloride, 10 mL, Intravenous, Q12H  traZODone, 100 mg, Oral, Daily      IV Meds:        Results Reviewed:   I have personally reviewed the results from the time of this admission to 2023 09:42 EDT     Results from last 7 days   Lab Units 23  0452 23  0605 23  0619    SODIUM mmol/L 132* 136 139   POTASSIUM mmol/L 4.2 4.3 4.7   CHLORIDE mmol/L 92* 92* 95*   CO2 mmol/L 34.1* 35.0* 37.9*   BUN mg/dL 49* 54* 55*   CREATININE mg/dL 1.53* 1.53* 1.33*   CALCIUM mg/dL 8.6 9.0 9.3   BILIRUBIN mg/dL 1.1 1.1 1.1   ALK PHOS U/L 100 117 121*   ALT (SGPT) U/L 46* 49* 65*   AST (SGOT) U/L 29 38 45*   GLUCOSE mg/dL 97 74 76       Estimated Creatinine Clearance: 56 mL/min (A) (by C-G formula based on SCr of 1.53 mg/dL (H)).  Results from last 7 days   Lab Units 06/05/23  0452 06/04/23  0605   PHOSPHORUS mg/dL 3.2 2.9             Results from last 7 days   Lab Units 05/31/23  0456   WBC 10*3/mm3 15.95*   HEMOGLOBIN g/dL 13.2   PLATELETS 10*3/mm3 459*             Assessment / Plan     ASSESSMENT:  1.  Acute kidney injury: Creatinine is up at 1.8 mg/dL at time of admission from baseline of 0.9.  Serum creatinine stable at 1.5.  Etiology likely secondary to pneumonia and diuresis.  2.  Hyponatremia: With some worsening.  3.  Interstitial lung disease now complicated by Legionella pneumonia management per pulmonary.  Patient currently on steroid and Levaquin  4.  History of pulm hypertension with last pulmonary arterial pressures 34 and PCWP at 19.  5.  Immunosuppression currently on Imuran and prednisone  6.  History of atrial fibrillation  7.  Respiratory failure secondary to possible pneumonia.  Unlikely to be due to to volume overload as the patient has been in negative balance        PLAN:   -Discontinue IV Bumex.  Start Lasix 40 mg PO daily.  [On Lasix 20 mg daily at home].    Thank you for involving us in the care of Orion Casiano.  Please feel free to call with any questions.    Aamir Fox MD  06/05/23  09:42 EDT    [facility]  [phone]

## 2023-06-05 NOTE — PROGRESS NOTES
Name: Orion Casiano ADMIT: 2023   : 1950  PCP: Niecy Galvan MD    MRN: [id number] LOS: 10 days   AGE/SEX: 72 y.o. male  ROOM: [facility]     Subjective   Subjective   Oxygenation much improved today.    Objective   Objective   Vital Signs  Temp:  [97.2 °F (36.2 °C)-97.9 °F (36.6 °C)] 97.2 °F (36.2 °C)  Heart Rate:  [59-95] 64  Resp:  [18-20] 18  BP: ()/(58-85) 121/85  SpO2:  [92 %-100 %] 93 %  on  Flow (L/min):  [5-45] 5;   Device (Oxygen Therapy): humidified;high-flow nasal cannula  Body mass index is 30.05 kg/m².  Physical Exam  Constitutional:       General: He is not in acute distress.     Appearance: Normal appearance. He is ill-appearing and toxic-appearing.   Cardiovascular:      Rate and Rhythm: Normal rate and regular rhythm.   Pulmonary:      Effort: Pulmonary effort is normal.      Breath sounds: No wheezing or rhonchi.   Chest:      Chest wall: No tenderness.   Abdominal:      General: Abdomen is flat. There is no distension.      Tenderness: There is no abdominal tenderness.   Skin:     Comments: Xerosis   Neurological:      General: No focal deficit present.      Mental Status: He is alert and oriented to person, place, and time.       Results Review     I reviewed the patient's new clinical results.  Results from last 7 days   Lab Units 23  0456   WBC 10*3/mm3 15.95*   HEMOGLOBIN g/dL 13.2   PLATELETS 10*3/mm3 459*       Results from last 7 days   Lab Units 23  0452 23  0605 23  0619 23  0715   SODIUM mmol/L 132* 136 139 139   POTASSIUM mmol/L 4.2 4.3 4.7 5.0   CHLORIDE mmol/L 92* 92* 95* 99   CO2 mmol/L 34.1* 35.0* 37.9* 34.6*   BUN mg/dL 49* 54* 55* 50*   CREATININE mg/dL 1.53* 1.53* 1.33* 1.26   GLUCOSE mg/dL 97 74 76 88   EGFR mL/min/1.73 48.0* 48.0* 56.8* 60.6       Results from last 7 days   Lab Units 23  0452 23  0605 23  0619 23  0715   ALBUMIN g/dL 2.6* 2.8* 2.6* 2.8*   BILIRUBIN mg/dL 1.1 1.1 1.1 0.9   ALK  PHOS U/L 100 117 121* 124*   AST (SGOT) U/L 29 38 45* 55*   ALT (SGPT) U/L 46* 49* 65* 69*       Results from last 7 days   Lab Units 06/05/23  0452 06/04/23  0605 06/03/23  0619 06/02/23  0715   CALCIUM mg/dL 8.6 9.0 9.3 9.6   ALBUMIN g/dL 2.6* 2.8* 2.6* 2.8*   PHOSPHORUS mg/dL 3.2 2.9  --   --              No results found for: HGBA1C, POCGLU    No radiology results for the last day    Scheduled Medications  allopurinol, 100 mg, Oral, Daily  apixaban, 5 mg, Oral, BID  Azelastine HCl, 2 spray, Each Nare, BID  bumetanide, 2 mg, Intravenous, Daily  dilTIAZem CD, 180 mg, Oral, Q12H  ipratropium-albuterol, 3 mL, Nebulization, 4x Daily - RT  levoFLOXacin, 750 mg, Oral, Q24H  methotrexate, 2.5 mg, Oral, Weekly  montelukast, 10 mg, Oral, Nightly  predniSONE, 30 mg, Oral, Daily  rosuvastatin, 10 mg, Oral, Daily  senna-docusate sodium, 2 tablet, Oral, BID  sodium chloride, 10 mL, Intravenous, Q12H  traZODone, 100 mg, Oral, Daily    Infusions   Diet  Diet: Renal Diets; Low Sodium (2-3g), Low Potassium, Low Phosphorus; Texture: Regular Texture (IDDSI 7); Fluid Consistency: Thin (IDDSI 0)       Assessment/Plan     Active Hospital Problems    Diagnosis  POA    **Healthcare-associated pneumonia [J18.9]  Yes    A-fib [I48.91]  Unknown    MURIEL (acute kidney injury) [N17.9]  Unknown    Acute respiratory failure with hypoxia [J96.01]  Yes    Chronic interstitial lung disease [J84.9]  Yes    Primary hypertension [I10]  Yes    Asthma [J45.909]  Yes      Resolved Hospital Problems   No resolved problems to display.       72 y.o. male admitted with Healthcare-associated pneumonia.      06/05/23  We will ask PT to see his he says he has not been up moving much.  Finish antibiotics today.  Transition to p.o. Lasix.  Likely DC tomorrow.    Acute on chronic hypoxic respiratory failure  Legionella pneumonia  ILD (2L home O2)   -still requiring 4-5L NC  -DuoNebs, prednisone  -CT chest (6-23): New patchy consolidation in RLL concerning for PNA;  repeat chest CT in 3 months recommended (around 9/2/2023)  -Pulmonology following  -cont Levaquin 7 days    MURIEL  -Crt 1.83 OA (b/l ~0.9) > 1.3 (6/1) > 1.26 > 1.33 > 1.53  -Nephrology following  -Transition to p.o. Lasix    A-fib  -RC: Diltiazem 180 mg twice daily  - AC: Apixaban    Gout  -Allopurinol 100 mg    Transaminitis  -GI following  -US w/ hepatomegaly  -duplex WNL      Eliquis (home med) for DVT prophylaxis.  Discussed with patient and care team on multidisciplinary rounds.  Anticipate discharge home tomorrow      Jerod Ram MD  [facility]  06/05/23  07:34 EDT

## 2023-06-05 NOTE — PLAN OF CARE
Goal Outcome Evaluation:           Progress: improving          VSS on 2 liters.  A-Ox4.  Possible DC tomorrow.  Will CTM.

## 2023-06-05 NOTE — PROGRESS NOTES
[facility]       Mar/chart reviewed  Follow up ILD/pna  Says he is feeling improved, not much cough    Vital Sign Min/Max for last 24 hours  Temp  Min: 97.2 °F (36.2 °C)  Max: 97.9 °F (36.6 °C)   BP  Min: 97/58  Max: 121/85   Pulse  Min: 59  Max: 72   Resp  Min: 18  Max: 20   SpO2  Min: 93 %  Max: 100 %   Flow (L/min)  Min: 3  Max: 8   No data recorded     Appears ill, axox3,   perrl, eomi, no icterus,  mmm, no jvd, trachea midline, neck supple,  chest decreased ae bilaterally, + crackles, no wheezes,   rrr,   soft, nt, nd +bs,  no c/c/ e  Skin warm, dry no rashes    Labs: 6/5: reviewed:  Bun 49  Cr 1.53  Na 132  Bicarb 34    A/P:  ILD -- stable, continue home meds  HYpoxemia --improving  Pneumonia -- finish antibiotics  Pulmonary hypertension    Patient is new to me today, no objection to d/c

## 2023-06-06 VITALS
OXYGEN SATURATION: 95 % | RESPIRATION RATE: 18 BRPM | HEIGHT: 74 IN | SYSTOLIC BLOOD PRESSURE: 118 MMHG | WEIGHT: 230.9 LBS | DIASTOLIC BLOOD PRESSURE: 63 MMHG | HEART RATE: 50 BPM | BODY MASS INDEX: 29.63 KG/M2 | TEMPERATURE: 97.4 F

## 2023-06-06 PROBLEM — N17.9 AKI (ACUTE KIDNEY INJURY): Status: RESOLVED | Noted: 2023-05-26 | Resolved: 2023-06-06

## 2023-06-06 LAB
ALBUMIN SERPL-MCNC: 2.8 G/DL (ref 3.5–5.2)
ALBUMIN/GLOB SERPL: 0.8 G/DL
ALP SERPL-CCNC: 107 U/L (ref 39–117)
ALT SERPL W P-5'-P-CCNC: 44 U/L (ref 1–41)
ANION GAP SERPL CALCULATED.3IONS-SCNC: 5 MMOL/L (ref 5–15)
AST SERPL-CCNC: 34 U/L (ref 1–40)
BILIRUB SERPL-MCNC: 0.8 MG/DL (ref 0–1.2)
BUN SERPL-MCNC: 40 MG/DL (ref 8–23)
BUN/CREAT SERPL: 32.3 (ref 7–25)
CALCIUM SPEC-SCNC: 8.3 MG/DL (ref 8.6–10.5)
CHLORIDE SERPL-SCNC: 91 MMOL/L (ref 98–107)
CO2 SERPL-SCNC: 37 MMOL/L (ref 22–29)
CREAT SERPL-MCNC: 1.24 MG/DL (ref 0.76–1.27)
EGFRCR SERPLBLD CKD-EPI 2021: 61.8 ML/MIN/1.73
GLOBULIN UR ELPH-MCNC: 3.3 GM/DL
GLUCOSE SERPL-MCNC: 99 MG/DL (ref 65–99)
POTASSIUM SERPL-SCNC: 4.1 MMOL/L (ref 3.5–5.2)
PROT SERPL-MCNC: 6.1 G/DL (ref 6–8.5)
SODIUM SERPL-SCNC: 133 MMOL/L (ref 136–145)

## 2023-06-06 PROCEDURE — 63710000001 PREDNISONE PER 1 MG: Performed by: INTERNAL MEDICINE

## 2023-06-06 PROCEDURE — 97110 THERAPEUTIC EXERCISES: CPT

## 2023-06-06 PROCEDURE — 97161 PT EVAL LOW COMPLEX 20 MIN: CPT

## 2023-06-06 PROCEDURE — 63710000001 METHOTREXATE PER 2.5 MG: Performed by: INTERNAL MEDICINE

## 2023-06-06 PROCEDURE — 94799 UNLISTED PULMONARY SVC/PX: CPT

## 2023-06-06 PROCEDURE — 94760 N-INVAS EAR/PLS OXIMETRY 1: CPT

## 2023-06-06 PROCEDURE — 63710000001 PREDNISONE PER 5 MG: Performed by: INTERNAL MEDICINE

## 2023-06-06 PROCEDURE — 80053 COMPREHEN METABOLIC PANEL: CPT | Performed by: INTERNAL MEDICINE

## 2023-06-06 PROCEDURE — 94761 N-INVAS EAR/PLS OXIMETRY MLT: CPT

## 2023-06-06 RX ORDER — PREDNISONE 10 MG/1
TABLET ORAL
Qty: 40 TABLET | Refills: 0 | Status: SHIPPED | OUTPATIENT
Start: 2023-06-07 | End: 2023-07-12

## 2023-06-06 RX ORDER — FUROSEMIDE 40 MG/1
40 TABLET ORAL DAILY
Qty: 30 TABLET | Refills: 0 | Status: SHIPPED | OUTPATIENT
Start: 2023-06-07

## 2023-06-06 RX ORDER — DILTIAZEM HYDROCHLORIDE 180 MG/1
180 CAPSULE, COATED, EXTENDED RELEASE ORAL EVERY 12 HOURS
Qty: 60 CAPSULE | Refills: 1 | Status: SHIPPED | OUTPATIENT
Start: 2023-06-06

## 2023-06-06 RX ADMIN — IPRATROPIUM BROMIDE AND ALBUTEROL SULFATE 3 ML: 2.5; .5 SOLUTION RESPIRATORY (INHALATION) at 15:37

## 2023-06-06 RX ADMIN — TRAZODONE HYDROCHLORIDE 100 MG: 100 TABLET ORAL at 08:14

## 2023-06-06 RX ADMIN — PREDNISONE 30 MG: 10 TABLET ORAL at 08:14

## 2023-06-06 RX ADMIN — APIXABAN 5 MG: 5 TABLET, FILM COATED ORAL at 08:14

## 2023-06-06 RX ADMIN — AZELASTINE HYDROCHLORIDE 274 MCG: 137 SPRAY, METERED NASAL at 08:18

## 2023-06-06 RX ADMIN — METHOTREXATE 2.5 MG: 2.5 TABLET ORAL at 08:14

## 2023-06-06 RX ADMIN — ALLOPURINOL 100 MG: 100 TABLET ORAL at 08:14

## 2023-06-06 RX ADMIN — DOCUSATE SODIUM 50MG AND SENNOSIDES 8.6MG 2 TABLET: 8.6; 5 TABLET, FILM COATED ORAL at 08:14

## 2023-06-06 RX ADMIN — Medication 10 ML: at 08:14

## 2023-06-06 RX ADMIN — IPRATROPIUM BROMIDE AND ALBUTEROL SULFATE 3 ML: 2.5; .5 SOLUTION RESPIRATORY (INHALATION) at 08:22

## 2023-06-06 RX ADMIN — FUROSEMIDE 40 MG: 40 TABLET ORAL at 08:14

## 2023-06-06 RX ADMIN — IPRATROPIUM BROMIDE AND ALBUTEROL SULFATE 3 ML: 2.5; .5 SOLUTION RESPIRATORY (INHALATION) at 11:33

## 2023-06-06 RX ADMIN — DILTIAZEM HYDROCHLORIDE 180 MG: 180 CAPSULE, COATED, EXTENDED RELEASE ORAL at 08:14

## 2023-06-06 RX ADMIN — ROSUVASTATIN CALCIUM 10 MG: 10 TABLET, FILM COATED ORAL at 08:14

## 2023-06-06 NOTE — THERAPY EVALUATION
Patient Name: Orion Casiano  : 1950    MRN: [id number]                              Today's Date: 2023       Admit Date: 2023    Visit Dx:     ICD-10-CM ICD-9-CM   1. Healthcare-associated pneumonia  J18.9 486   2. Chronic interstitial lung disease  J84.9 515   3. Paroxysmal atrial fibrillation  I48.0 427.31   4. Anticoagulated by anticoagulation treatment  Z79.01 V58.61   5. Asthma with acute exacerbation, unspecified asthma severity, unspecified whether persistent  J45.901 493.92   6. Chronic pain syndrome  G89.4 338.4     Patient Active Problem List   Diagnosis    Adenomatous polyp of colon    Asthma    Chronic pain    Mixed hyperlipidemia    Primary hypertension    Lumbar radiculopathy    Acute respiratory failure with hypoxia    Chronic interstitial lung disease    Elevated troponin    New onset atrial fibrillation    History of adenomatous polyp of colon    Hyperglycemia    Healthcare-associated pneumonia    A-fib     Past Medical History:   Diagnosis Date    Acute bronchitis     Adenomatous polyp of colon 2020    Added automatically from request for surgery [id number]    Allergic rhinitis     Asthma     Bacterial pneumonia     Bilateral lower leg cellulitis     Colon polyp     Cutaneous abscess of left lower limb     Deficiency of other specified B group vitamins     Dermatitis     Disc disorder of lumbar region     worse at L4-5 with protusion/herniation    Dysuria     Effusion, right hand     resolved    Essential (primary) hypertension     Frequency of micturition     Gout     Hematuria     Hyperglycemia     Hyperlipidemia     Hypertension     ILD (interstitial lung disease)     Insomnia     Localized swelling, mass and lump, unspecified     Low back pain     Lumbago     Microscopic hematuria     Pain in left knee     Sleep disturbances     Vertigo     resolved    Vitamin B12 deficiency      Past Surgical History:   Procedure Laterality Date    BRONCHOSCOPY  2023    Procedure:  BRONCHOSCOPY WITH FLUORO, BAL, AND BIOPSIES AND ENDOBRONCHIAL ULTRASOUND WITH FNA;  Surgeon: Rubens Sheets MD;  Location: [facility] ENDOSCOPY;  Service: Pulmonary;;  INTERSTITIAL LUNG DISEASE    CARDIAC CATHETERIZATION N/A 02/24/2023    Procedure: Right Heart Cath;  Surgeon: Ravi Fonseca MD;  Location:  [facility] CATH INVASIVE LOCATION;  Service: Cardiovascular;  Laterality: N/A;    COLONOSCOPY      maybe 2016    COLONOSCOPY N/A 03/11/2020    Procedure: COLONOSCOPY TO CECUM AND TI WITH COLD AND HOT SNARE AND POLYPECTOMIES;  Surgeon: Patti Navarro MD;  Location: [facility] ENDOSCOPY;  Service: Gastroenterology;  Laterality: N/A;  PRE: H/O COLON POLYPS  POST: POLYPS, HEMORRHOIDS, DIVERTICULOSIS    LUMBAR DISC SURGERY      2008    NOSE SURGERY        General Information       Row Name 06/06/23 1334          Physical Therapy Time and Intention    Document Type evaluation  -PC     Mode of Treatment physical therapy  -PC       Row Name 06/06/23 1334          General Information    Patient Profile Reviewed yes  -PC     Prior Level of Function independent:  -PC     Existing Precautions/Restrictions oxygen therapy device and L/min  -PC       Row Name 06/06/23 1334          Living Environment    People in Home alone  -PC       Row Name 06/06/23 1334          Home Main Entrance    Number of Stairs, Main Entrance none  -PC       Row Name 06/06/23 1334          Stairs Within Home, Primary    Stairs, Within Home, Primary flight of stairs to bedroom, has a basement  -PC       Row Name 06/06/23 1334          Cognition    Orientation Status (Cognition) oriented x 4  -PC       Row Name 06/06/23 1334          Safety Issues, Functional Mobility    Impairments Affecting Function (Mobility) endurance/activity tolerance;shortness of breath  -PC               User Key  (r) = Recorded By, (t) = Taken By, (c) = Cosigned By      Initials Name Provider Type    PC Jazmine Bashir, PT Physical Therapist                   Mobility       Row  Name 06/06/23 1335          Bed Mobility    Bed Mobility supine-sit  -PC     Supine-Sit Pax (Bed Mobility) independent  -PC       Row Name 06/06/23 1335          Sit-Stand Transfer    Sit-Stand Pax (Transfers) independent  -PC       Row Name 06/06/23 1335          Gait/Stairs (Locomotion)    Pax Level (Gait) standby assist  -PC     Distance in Feet (Gait) 150 ft on o2 2L  -PC     Bilateral Gait Deviations forward flexed posture  -PC     Comment, (Gait/Stairs) SOA with activity, perf high knees and deeo kne bends to mimic stairs, pt able to perform without difficulty  -PC               User Key  (r) = Recorded By, (t) = Taken By, (c) = Cosigned By      Initials Name Provider Type    PC Jazmine Bashir, PT Physical Therapist                   Obj/Interventions       Row Name 06/06/23 1336          Range of Motion Comprehensive    General Range of Motion no range of motion deficits identified  -PC       Row Name 06/06/23 1336          Strength Comprehensive (MMT)    General Manual Muscle Testing (MMT) Assessment no strength deficits identified  -PC       Row Name 06/06/23 1336          Balance    Balance Assessment sitting static balance;sitting dynamic balance;sit to stand dynamic balance;standing static balance;standing dynamic balance  -PC     Static Sitting Balance independent  -PC     Dynamic Sitting Balance independent  -PC     Sit to Stand Dynamic Balance independent  -PC     Static Standing Balance independent  -PC     Dynamic Standing Balance standby assist  -PC               User Key  (r) = Recorded By, (t) = Taken By, (c) = Cosigned By      Initials Name Provider Type    PC Jazmine Bashir, PT Physical Therapist                   Goals/Plan    No documentation.                  Clinical Impression       Row Name 06/06/23 1337          Pain    Pretreatment Pain Rating 0/10 - no pain  -PC       Row Name 06/06/23 1337          Plan of Care Review    Plan of Care Reviewed With patient   -PC     Outcome Evaluation Pt adm with SOA, dx with asthma exacerbation due to [employer] PNA. Pt lives alone and is on O2 at home, he has no stairs to enter the home but a flight of stairs to his bedroom. Today pt was able to get out of bed and walk 150 ft by himself with O2 2L, he was SOA with activity. He has forward flexed posture, shuffling gait, but no loss of balance. Would rec home health PT, educated pt about frequent rest breaks, deep breathing, due to SOA with activity. Pt has been discharged home.  -PC       Row Name 06/06/23 9853          Therapy Assessment/Plan (PT)    Criteria for Skilled Interventions Met (PT) other (see comments)  being discharged today, rec home health PT to follow up  -PC       Row Name 06/06/23 0059          Vital Signs    Pre SpO2 (%) 90  -PC     O2 Delivery Pre Treatment supplemental O2  -PC     Intra SpO2 (%) 85  -PC     O2 Delivery Intra Treatment supplemental O2  -PC     Post SpO2 (%) 90  -PC     O2 Delivery Post Treatment supplemental O2  -PC       Row Name 06/06/23 4086          Positioning and Restraints    Pre-Treatment Position in bed  -PC     Post Treatment Position chair  -PC     In Chair reclined;call light within reach;encouraged to call for assist;notified nsg  -PC               User Key  (r) = Recorded By, (t) = Taken By, (c) = Cosigned By      Initials Name Provider Type    PC Jazmine Bashir, PT Physical Therapist                   Outcome Measures       Row Name 06/06/23 5328          How much help from another person do you currently need...    Turning from your back to your side while in flat bed without using bedrails? 4  -PC     Moving from lying on back to sitting on the side of a flat bed without bedrails? 4  -PC     Moving to and from a bed to a chair (including a wheelchair)? 4  -PC     Standing up from a chair using your arms (e.g., wheelchair, bedside chair)? 4  -PC     Climbing 3-5 steps with a railing? 3  -PC     To walk in hospital room? 4  -PC      AM-PAC 6 Clicks Score (PT) 23  -PC     Highest level of mobility 7 --> Walked 25 feet or more  -PC               User Key  (r) = Recorded By, (t) = Taken By, (c) = Cosigned By      Initials Name Provider Type    PC Jazmine Bashir PT Physical Therapist                                 Physical Therapy Education       Title: PT OT SLP Therapies (Resolved)       Topic: Physical Therapy (Resolved)       Point: Mobility training (Resolved)       Learning Progress Summary             Patient Acceptance, E,D, DU by PC at 6/6/2023 1344                         Point: Precautions (Resolved)       Learning Progress Summary             Patient Acceptance, E,D, DU by PC at 6/6/2023 1344                                         User Key       Initials Effective Dates Name Provider Type Discipline     06/16/21 -  Jazmine Bashir, PT Physical Therapist PT                  PT Recommendation and Plan     Plan of Care Reviewed With: patient  Outcome Evaluation: Pt adm with SOA, dx with asthma exacerbation due to [employer] PNA. Pt lives alone and is on O2 at home, he has no stairs to enter the home but a flight of stairs to his bedroom. Today pt was able to get out of bed and walk 150 ft by himself with O2 2L, he was SOA with activity. He has forward flexed posture, shuffling gait, but no loss of balance. Would rec home health PT, educated pt about frequent rest breaks, deep breathing, due to SOA with activity. Pt has been discharged home.     Time Calculation:    PT Charges       Row Name 06/06/23 1345             Time Calculation    Start Time 1305  -PC      Stop Time 1325  -PC      Time Calculation (min) 20 min  -PC      PT Received On 06/06/23  -                User Key  (r) = Recorded By, (t) = Taken By, (c) = Cosigned By      Initials Name Provider Type    PC Jazmine Bashir, PT Physical Therapist                  Therapy Charges for Today       Code Description Service Date Service Provider Modifiers Qty    40569917447  HC PT EVAL LOW COMPLEXITY 2 6/6/2023 Jazmine Bashir, PT GP 1    73992083489 HC PT THER PROC EA 15 MIN 6/6/2023 Jazmine Bashir, PT GP 1            PT G-Codes  AM-PAC 6 Clicks Score (PT): 23  PT Discharge Summary  Anticipated Discharge Disposition (PT): home with home health    Jazmine Bashir, PT  6/6/2023

## 2023-06-06 NOTE — DISCHARGE PLACEMENT REQUEST
"Orion Casiano (72 y.o. Male)       Date of Birth   1950    Social Security Number       Address   [address] [place] [address]  [facility] [address]    Home Phone   [phone]    MRN   [id number]       Religious   [employer]    Marital Status                               Admission Date   5/26/23    Admission Type   Emergency    Admitting Provider   Daniel Kaur MD    Attending Provider   Jerod Ram MD    Department, Room/Bed   [address], [address]       Discharge Date       Discharge Disposition   Home or Self Care    Discharge Destination                                 Attending Provider: Jerod Ram MD    Allergies: Ibuprofen, Aspirin    Isolation: None   Infection: None   Code Status: CPR    Ht: 186.7 cm (73.5\")   Wt: 105 kg (230 lb 14.4 oz)    Admission Cmt: None   Principal Problem: Healthcare-associated pneumonia [J18.9]                   Active Insurance as of 5/26/2023       Primary Coverage       Payor Plan Insurance Group Employer/Plan Group    HUMANA MEDICARE REPLACEMENT HUMANA MEDICARE REPLACEMENT [id number]       Payor Plan Address Payor Plan Phone Number Payor Plan Fax Number Effective Dates    [address] [address] [phone]  1/1/2018 - None Entered    [facility], [facility] [address]         Subscriber Name Subscriber Birth Date Member ID       ORION CASIANO 1950 [id number]                     Emergency Contacts        (Rel.) Home Phone Work Phone Mobile Phone    RORY CASIANO (Son) -- -- [phone]                "

## 2023-06-06 NOTE — PLAN OF CARE
Goal Outcome Evaluation:  Plan of Care Reviewed With: patient           Outcome Evaluation: Pt adm with SOA, dx with asthma exacerbation due to [employer] PNA. Pt lives alone and is on O2 at home, he has no stairs to enter the home but a flight of stairs to his bedroom. Today pt was able to get out of bed and walk 150 ft by himself with O2 2L, he was SOA with activity. He has forward flexed posture, shuffling gait, but no loss of balance. Would rec home health PT, educated pt about frequent rest breaks, deep breathing, due to SOA with activity. Pt has been discharged home.

## 2023-06-06 NOTE — DISCHARGE SUMMARY
Patient Name: Orion Casiano  : 1950  MRN: [id number]    Date of Admission: 2023  Date of Discharge:  2023  Primary Care Physician: Niecy Galvan MD      Chief Complaint:   Weakness - Generalized and Shortness of Breath      Discharge Diagnoses     Active Hospital Problems    Diagnosis  POA    **Healthcare-associated pneumonia [J18.9]  Yes    A-fib [I48.91]  Yes    Acute respiratory failure with hypoxia [J96.01]  Yes    Chronic interstitial lung disease [J84.9]  Yes    Primary hypertension [I10]  Yes    Asthma [J45.909]  Yes      Resolved Hospital Problems    Diagnosis Date Resolved POA    MURIEL (acute kidney injury) [N17.9] 2023 Unknown        Brief Admitting HPI     Patient is a 72-year-old male with complicated past medical history including history of interstitial lung disease, chronic back pain, asthma, hypertension who is on ongoing treatment with prednisone and methotrexate and Imuran for his lung disease and follows with Dr. Martinez, has also history of atrial fibrillation on anticoagulation therapy since February of this year was in his usual state of his health with progressive shortness of breath fatigue and weakness until Tuesday when his symptoms got worse with discomfort in the chest getting worse with coughing and taking deep breaths. He was running low-grade temperature and was producing orangeish greenish phlegm with coughing. Because of his fatigue weakness and fever patient decided to come to the emergency room for further evaluation where work-up revealed worsening right-sided infiltrate in the setting of interstitial lung disease. Patient was noted to have hypoxia on his ABG. Work-up in the emergency room also included respiratory viral panel which came back negative. Sputum culture and blood cultures have been sent patient was empirically started on a dose of cefepime and vancomycin for suspected healthcare associated pneumonia and pulmonary service was  consulted. He has seen the patient in the emergency room and recommended higher dose of steroid while he is recovering from his underlying asthma exacerbation associated with pneumonia. They recommended de-escalation of antibiotics to ceftriaxone and Zithromax. After nebulizer treatment and steroids and antibiotics patient is feeling somewhat better.     Hospital Course     Pt admitted for acute on chronic hypoxic respiratory failure in setting of asthma exacerbation with Legionella pneumonia.  He was treated with 7-day course of Levaquin.  Hospital course complicated by A-fib with RVR for which cardiology was consulted and medication regimen was adjusted as summarized below.  In addition he had MURIEL for which nephrology was consulted.  In addition he was seen by GI given transaminitis, which was attributed to sepsis from Legionella pneumonia.  They did not recommend any additional work-up at this time.  He has a colonoscopy scheduled with Dr. Navarro on 9/19/2023 for routine CRC screening.  Patient was slowly able to be weaned on his oxygen requirements to his home dose.  He will be discharged with a steroid taper and will need follow-up with his regular pulmonologist Dr Sullivan.  He will need a repeat CT chest in 3 months (around 9/2/2023), given abnormal findings.  All this was discussed with patient he expressed understanding.  He is stable for discharge at this time.    Discharge Plan     Acute on chronic hypoxic respiratory failure  Legionella pneumonia  ILD (2L home O2)   -CT chest (6-23): New patchy consolidation in RLL concerning for PNA; repeat chest CT in 3 months recommended (around 9/2/2023)  -Steroid taper on discharge until resuming home 10 mg daily; follow-up with Dr. Sullivan   -Completed Levaquin 7 days     A-fib  -RC: Diltiazem 180 mg twice daily  - AC: Apixaban     Gout  -Allopurinol 100 mg     Transaminitis, resolved  -GI followed- no additional recommendations  -US w/ hepatomegaly  -duplex WNL        Day of Discharge     Subjective:  Feeling better.  Breathing much improved.  Ready for discharge.    Physical Exam:  Temp:  [97.9 °F (36.6 °C)-98.1 °F (36.7 °C)] 98.1 °F (36.7 °C)  Heart Rate:  [57-91] 63  Resp:  [17-20] 18  BP: (111-128)/(61-81) 126/75  Body mass index is 30.05 kg/m².  Physical Exam  Constitutional:       General: He is not in acute distress.     Appearance: Normal appearance.  Cardiovascular:      Rate and Rhythm: Normal rate and regular rhythm.   Pulmonary:      Effort: Pulmonary effort is normal.      Breath sounds: No wheezing or rhonchi.   Chest:      Chest wall: No tenderness.   Abdominal:      General: Abdomen is flat. There is no distension.      Tenderness: There is no abdominal tenderness.   Skin:     Comments: Xerosis   Neurological:      General: No focal deficit present.      Mental Status: He is alert and oriented to person, place, and time.     Consultants     Consult Orders (all) (From admission, onward)       Start     Ordered    05/30/23 1106  Inpatient Gastroenterology Consult  Once        Specialty:  Gastroenterology  Provider:  Gallito Cornejo MD    05/30/23 1106    05/29/23 1935  Inpatient Nephrology Consult  Once        Specialty:  Nephrology  Provider:  Daljit Sheets MD    05/29/23 1934    05/29/23 1435  Inpatient Cardiology Consult  Once        Specialty:  Cardiology  Provider:  Joni Heart MD    05/29/23 1434    05/26/23 1506  [facility] (on-call MD unless specified) Details  Once        Specialty:  Hospitalist  Provider:  Daniel Kaur MD    05/26/23 1505    05/26/23 1506  Pulmonology (on-call MD unless specified)  Once        Specialty:  Pulmonary Disease  Provider:  Rubens Sheets MD    05/26/23 1505                  Procedures     * Surgery not found *      Imaging Results (All)       Procedure Component Value Units Date/Time    CT Chest Without Contrast Diagnostic [[id number]] Collected: 06/02/23 1514     Updated: 06/02/23 1531    Narrative:      CT CHEST  WO CONTRAST DIAGNOSTIC-     HISTORY:  Shortness of air.     TECHNIQUE: CT of the chest was performed without intravenous contrast.  Reformatted images were reviewed. Radiation dose reduction techniques  were utilized, including automated exposure control and exposure  modulation based on body size.     COMPARISON:  Chest radiograph 06/20/2023. CT chest 02/22/2023.        FINDINGS: There are prominent and mildly enlarged bilateral axillary  lymph nodes, which are relatively symmetric. These have improved from  02/22/2023. An enlarged 1.6 cm short axis right paratracheal lymph node  is similar to slightly smaller, previously 1.8 cm when remeasured. A 1.6  cm short axis right hilar lymph node is also similar, previously 1.7 cm  when remeasured.  The heart is mildly enlarged. There is no pericardial effusion. There is  mild calcific aortic atherosclerosis. The ascending aorta is dilated to  4.0 cm, previously 2.8 cm when remeasured. The pulmonary trunk is  dilated to 3.6 cm, previously 3.4 cm when remeasured. The descending  aorta is dilated to 3.3 cm, previously 3.1 cm. Pleural spaces are clear.  There is mild asymmetric elevation of the left hemidiaphragm. Limited  imaging through the upper abdomen demonstrates calcific atherosclerosis.  There is a left renal stone. There is multilevel degenerative disc  disease.  There are trace secretions in the trachea. There is scattered mucous  plugging in distal airways. There is relatively diffuse groundglass  opacity with corresponding bronchiectasis and fibrotic changes  throughout the right lung and to a lesser degree in the peripheral left  lung, where is most pronounced in the left upper lobe. This appearance  has progressed from 02/22/2023. There is also superimposed dense patchy  consolidation in the right lower lobe, which raises concern for  pneumonia.           Impression:         1.  New patchy consolidation in the right lower lobe concerning for  pneumonia. Findings  are superimposed on chronic fibrotic changes, right  greater than left, which have progressed. Recommend short-term follow-up  CT chest in 3 months.  2.  Lymphadenopathy has overall slightly improved. Attention on  follow-up imaging is recommended.        This report was finalized on 6/2/2023 3:28 PM by Dr. Caryn Frost M.D.       XR Chest 1 View [[id number]] Collected: 06/02/23 0726     Updated: 06/02/23 0730    Narrative:      XR CHEST 1 VW-  06/02/2023     HISTORY: Hypoxia.     The heart size appears to be within normal limits. There is moderately  severe diffuse infiltrate in the right lung with some mild patchy  atelectasis or infiltrate in the left base.     No pneumothorax is seen.     Chest appears largely unchanged from the 05/29/2023 study. Lungs are  underinflated.     This report was finalized on 6/2/2023 7:27 AM by Dr. Carrington Mayes M.D.       US Liver [[id number]] Collected: 05/31/23 0556     Updated: 05/31/23 0602    Narrative:      LIVER ULTRASOUND     HISTORY: Abnormal elevated liver function tests     COMPARISON: None available.     TECHNIQUE: Grayscale and color Doppler sonographic images were obtained  in the right upper quadrant.     FINDINGS:  Pancreas cannot be visualized due to overlying bowel gas. Main portal  vein is patent with hepatopedal flow. Liver is enlarged, measuring up to  18 cm in craniocaudal dimensions. The liver is not frankly nodular in  contour. No focal hepatic lesions are seen. There is no intra or  extrahepatic biliary dilatation. Common bile duct measures 6 to 7 mm. No  stones or sludge are seen within the gallbladder, and there is no  gallbladder wall thickening or pericholecystic fluid. Gallbladder wall  measures 2 mm. Right kidney measures 11.8 x 5.7 x 4.8 cm. There is no  hydronephrosis. Renal echotexture is normal.       Impression:      Hepatomegaly. There is no intra or extrahepatic biliary dilatation.     This report was finalized on 5/31/2023 5:59 AM by   Jacqueline Adkins M.D.       XR Chest 1 View [[id number]] Collected: 05/29/23 0609     Updated: 05/29/23 0616    Narrative:      XR CHEST 1 VW-     HISTORY: Male who is 72 years-old,  pneumonia, follow-up     TECHNIQUE: Frontal view of the chest     COMPARISON: 05/26/2023     FINDINGS: The heart size is normal. Pulmonary vasculature is  unremarkable. Previous infiltrate throughout the right lung appears more  dense. Small left basilar atelectasis or infiltrate. No large pleural  effusion. No pneumothorax. Otherwise stable.       Impression:      Interval worsening, with increased density of right  pulmonary opacification, small left basilar atelectasis or infiltrate.     This report was finalized on 5/29/2023 6:13 AM by Dr. Darwin House M.D.       XR Chest 1 View [[id number]] Collected: 05/26/23 1453     Updated: 05/26/23 1458    Narrative:      XR CHEST 1 VW-     HISTORY:  Fever, shortness of breath.     COMPARISON:  Chest radiograph 02/25/2023     FINDINGS:    A single view of the chest was obtained. The cardiac silhouette and  mediastinal and hilar contours are not significantly changed. There are  low lung volumes. There are diffuse airspace opacities throughout the  right lung, progressed from 02/25/2023, concerning for pneumonia in the  appropriate clinical setting. Short-term follow-up imaging is  recommended. There is no large pleural effusion. There is incompletely  assessed fusion hardware in the cervical spine.     This report was finalized on 5/26/2023 2:55 PM by Dr. Caryn Frost M.D.             Results for orders placed during the hospital encounter of 05/26/23    Duplex Portal Hepatic Complete CAR    Interpretation Summary    All visualized vessels appear normal with normal flow direction and no evidence of thrombus.  However, the superior mesenteric vein and the distal splenic vein were not well seen due to overlying bowel gas.    Results for orders placed during the hospital encounter of  02/24/23    Adult Transthoracic Echo Complete W/ Cont if Necessary Per Protocol    Interpretation Summary    Left ventricular systolic function is normal. Calculated left ventricular EF = 67.8% Left ventricular ejection fraction appears to be 66 - 70%.    Left ventricular wall thickness is consistent with mild concentric hypertrophy.    The right ventricular cavity is mildly dilated.    The right atrial cavity is mildly  dilated.    Estimated right ventricular systolic pressure from tricuspid regurgitation is normal (<35 mmHg). Calculated right ventricular systolic pressure from tricuspid regurgitation is 28 mmHg.    Pertinent Labs     Results from last 7 days   Lab Units 05/31/23  0456   WBC 10*3/mm3 15.95*   HEMOGLOBIN g/dL 13.2   PLATELETS 10*3/mm3 459*     Results from last 7 days   Lab Units 06/06/23 0403 06/05/23 0452 06/04/23 0605 06/03/23  0619   SODIUM mmol/L 133* 132* 136 139   POTASSIUM mmol/L 4.1 4.2 4.3 4.7   CHLORIDE mmol/L 91* 92* 92* 95*   CO2 mmol/L 37.0* 34.1* 35.0* 37.9*   BUN mg/dL 40* 49* 54* 55*   CREATININE mg/dL 1.24 1.53* 1.53* 1.33*   GLUCOSE mg/dL 99 97 74 76   EGFR mL/min/1.73 61.8 48.0* 48.0* 56.8*     Results from last 7 days   Lab Units 06/06/23 0403 06/05/23 0452 06/04/23 0605 06/03/23  0619   ALBUMIN g/dL 2.8* 2.6* 2.8* 2.6*   BILIRUBIN mg/dL 0.8 1.1 1.1 1.1   ALK PHOS U/L 107 100 117 121*   AST (SGOT) U/L 34 29 38 45*   ALT (SGPT) U/L 44* 46* 49* 65*     Results from last 7 days   Lab Units 06/06/23 0403 06/05/23 0452 06/04/23 0605 06/03/23  0619   CALCIUM mg/dL 8.3* 8.6 9.0 9.3   ALBUMIN g/dL 2.8* 2.6* 2.8* 2.6*   PHOSPHORUS mg/dL  --  3.2 2.9  --                Invalid input(s): LDLCALC  Results from last 7 days   Lab Units 06/03/23  1243 06/03/23  1104   RESPCX  Scant growth (1+) Normal respiratory alyce. No S. aureus or Pseudomonas aeruginosa detected. Final report.  --    MRSAPCR   --  No MRSA Detected         Test Results Pending at Discharge       Discharge Details         Discharge Medications        New Medications        Instructions Start Date   dilTIAZem  MG 24 hr capsule  Commonly known as: CARDIZEM CD   180 mg, Oral, Every 12 Hours      predniSONE 10 MG tablet  Commonly known as: DELTASONE   Take 2 tablets by mouth Daily for 5 days, THEN 1 tablet Daily for 30 days.   Start Date: June 7, 2023            Changes to Medications        Instructions Start Date   furosemide 40 MG tablet  Commonly known as: LASIX  What changed:   medication strength  how much to take   40 mg, Oral, Daily   Start Date: June 7, 2023     traZODone 100 MG tablet  Commonly known as: DESYREL  What changed: when to take this   TAKE ONE TABLET BY MOUTH EVERY NIGHT AT BEDTIME             Continue These Medications        Instructions Start Date   albuterol sulfate  (90 Base) MCG/ACT inhaler  Commonly known as: PROVENTIL HFA;VENTOLIN HFA;PROAIR HFA   2 puffs, Inhalation, Every 6 Hours PRN      allopurinol 100 MG tablet  Commonly known as: ZYLOPRIM   TAKE ONE TABLET BY MOUTH DAILY      azaTHIOprine 50 MG tablet  Commonly known as: IMURAN   Oral, Daily      Azelastine HCl 137 MCG/SPRAY solution   PLACE 2 SPRAYS IN EACH NOSTRIL TWO TIMES A DAY      clobetasol 0.05 % ointment  Commonly known as: TEMOVATE   No dose, route, or frequency recorded.      doxepin 25 MG capsule  Commonly known as: SINEquan   No dose, route, or frequency recorded.      doxycycline 100 MG capsule  Commonly known as: VIBRAMYCIN   100 mg, Oral, 2 Times Daily      Eliquis 5 MG tablet tablet  Generic drug: apixaban   TAKE ONE TABLET BY MOUTH TWICE A DAY      flunisolide 25 MCG/ACT (0.025%) solution nasal spray  Commonly known as: NASALIDE   No dose, route, or frequency recorded.      Fluticasone Furoate-Vilanterol 200-25 MCG/ACT inhaler  Commonly known as: BREO ELLIPTA   Daily - RT      hydrocortisone 2.5 % ointment   No dose, route, or frequency recorded.      ipratropium-albuterol 0.5-2.5 mg/3 ml nebulizer  Commonly known  as: DUO-NEB   Inhale the contents of 1 vial by nebulization 4 (Four) Times a Day.      ketoconazole 2 % shampoo  Commonly known as: NIZORAL   No dose, route, or frequency recorded.      methocarbamol 500 MG tablet  Commonly known as: ROBAXIN   TAKE ONE TABLET BY MOUTH FOUR TIMES A DAY AS NEEDED FOR BACK PAIN      methotrexate 2.5 MG tablet   No dose, route, or frequency recorded.      montelukast 10 MG tablet  Commonly known as: Singulair   10 mg, Oral, Nightly      rosuvastatin 10 MG tablet  Commonly known as: CRESTOR   Oral, Daily      tadalafil 20 MG tablet  Commonly known as: CIALIS              Stop These Medications      amLODIPine 10 MG tablet  Commonly known as: NORVASC     lisinopril 10 MG tablet  Commonly known as: PRINIVIL,ZESTRIL              Allergies   Allergen Reactions    Ibuprofen Shortness Of Breath    Aspirin        Discharge Disposition:  Home or Self Care      Discharge Diet:  Diet Order   Procedures    Diet: Renal Diets; Low Sodium (2-3g), Low Potassium, Low Phosphorus; Texture: Regular Texture (IDDSI 7); Fluid Consistency: Thin (IDDSI 0)       Discharge Activity:   Activity Instructions       Activity as Tolerated              CODE STATUS:    Code Status and Medical Interventions:   Ordered at: 05/26/23 1838     Code Status (Patient has no pulse and is not breathing):    CPR (Attempt to Resuscitate)     Medical Interventions (Patient has pulse or is breathing):    Full Support       Future Appointments   Date Time Provider Department Center   6/15/2023 10:00 AM Niecy Galvan MD [facility]   7/11/2023  7:00 AM [facility] CT 3  [facility]   8/14/2023 10:30 AM Niecy Galvan MD [facility]      Contact information for follow-up providers       Niecy Galvan MD .    Specialty: Family Medicine  Contact information:  [address] [facility] [address] [phone]               Justin Sullivan MD Follow up in 2 week(s).    Specialty: Pulmonary  Disease  Contact information:  [address]  [facility] [address]  [facility] [id number] [phone]                       Contact information for after-discharge care       Home Medical Care       [facility] .    Services: Home Health Services, Home Nursing, Home Rehabilitation  Contact information:  [address] [place] [id number] [phone]                                   Time Spent on Discharge:  Greater than 30 minutes spent on discharge management including final examination, discussion of hospital stay and patient education, preparation of records, medication reconciliation, follow up planning      Jerod Ram MD  [facility]  06/06/23  09:46 EDT

## 2023-06-07 ENCOUNTER — READMISSION MANAGEMENT (OUTPATIENT)
Dept: CALL CENTER | Facility: HOSPITAL | Age: 73
End: 2023-06-07
Payer: MEDICARE

## 2023-06-07 NOTE — OUTREACH NOTE
Prep Survey      Flowsheet Row Responses   [facility] facility patient discharged from? [place]   Is LACE score < 7 ? No   Eligibility Readm Mgmt   Discharge diagnosis PNA   Does the patient have one of the following disease processes/diagnoses(primary or secondary)? Pneumonia   Does the patient have Home health ordered? Yes   What is the Home health agency?  [facility]    Is there a DME ordered? No   Prep survey completed? Yes            Janie RUCKER - Registered Nurse

## 2023-06-09 NOTE — CASE MANAGEMENT/SOCIAL WORK
Case Management Discharge Note      Final Note: home with [facility] jeremiah michel rn/ccp         Selected Continued Care - Discharged on 6/6/2023 Admission date: 5/26/2023 - Discharge disposition: Home or Self Care      Destination    No services have been selected for the patient.                Durable Medical Equipment    No services have been selected for the patient.                Dialysis/Infusion    No services have been selected for the patient.                Home Medical Care Coordination complete.      Service Provider Selected Services Address Phone Fax Patient Preferred    [facility] ,  Home Rehabilitation [address] [place] [address], [address] [address] [phone] --              Therapy    No services have been selected for the patient.                Community Resources    No services have been selected for the patient.                Community & DME    No services have been selected for the patient.                    Transportation Services  Private: Car    Final Discharge Disposition Code: 06 - home with home health care

## 2023-06-13 ENCOUNTER — READMISSION MANAGEMENT (OUTPATIENT)
Dept: CALL CENTER | Facility: HOSPITAL | Age: 73
End: 2023-06-13
Payer: MEDICARE

## 2023-06-13 DIAGNOSIS — I48.91 ATRIAL FIBRILLATION, UNSPECIFIED TYPE: ICD-10-CM

## 2023-06-13 RX ORDER — APIXABAN 5 MG/1
TABLET, FILM COATED ORAL
Qty: 60 TABLET | Refills: 0 | Status: SHIPPED | OUTPATIENT
Start: 2023-06-13

## 2023-06-13 NOTE — OUTREACH NOTE
COPD/PN Week 1 Survey      Flowsheet Row Responses   [facility] facility patient discharged from? [place]   Does the patient have one of the following disease processes/diagnoses(primary or secondary)? Pneumonia   Week 1 attempt successful? No   Unsuccessful attempts Attempt 1            Darlene Rico Registered Nurse

## 2023-06-15 NOTE — PROGRESS NOTES
Enter Query Response Below      Query Response: sepsis present on admission             If applicable, please update the problem list.     Patient: Orion Casiano        : 1950  Account: [id number]           Admit Date: 2023        How to Respond to this query:       a. Click New Note     b. Answer query within the yellow box.                c. Update the Problem List, if applicable.      If you have any questions about this query contact me at: [email or web address]         72 year old patient with history of interstitial lung disease, chronic back pain, asthma, and hypertension was diagnosed with pneumonia.     Vital signs include temperature 100.7, blood pressure 126/72, heart rate 110, respirations 26, 02 99% on room air.     Lab WBC 22.52, lactate 2.8, procalcitonin 9.19, Alkaline phosphate 153, ALT 50, and AST 85.     Sepsis included in  Gastroenterology and Nephrology Consult Notes, progress notes from - , and Discharge Summary.      Patient was treated with NS bolus 1000ml, IV Rocephin, Zithromax, Cefepime and Vancomycin for suspected healthcare associated pneumonia and sepsis.     After study, can the diagnosis of sepsis be further clarified as:    sepsis present on admission    sepsis not present on admission    other (please specify) _________________    clinically unable to determine     By submitting this query, we are merely seeking further clarification of documentation to accurately reflect all conditions that you are monitoring, evaluating, treating or that extend the hospitalization or utilize additional resources of care. Please utilize your independent clinical judgment when addressing the question(s) above.     This query and your response, once completed, will be entered into the legal medical record.    Sincerely,  Estella BURGOS RN CDI CCDS  Clinical Documentation Integrity Program   [email or web address]

## 2023-06-16 ENCOUNTER — READMISSION MANAGEMENT (OUTPATIENT)
Dept: CALL CENTER | Facility: HOSPITAL | Age: 73
End: 2023-06-16
Payer: MEDICARE

## 2023-06-16 NOTE — OUTREACH NOTE
COPD/PN Week 1 Survey      Flowsheet Row Responses   [facility] patient discharged from? [place]   Does the patient have one of the following disease processes/diagnoses(primary or secondary)? Pneumonia   Week 1 attempt successful? Yes   Call start time 0920   Call end time 0926   Discharge diagnosis PNA   Is patient permission given to speak with other caregiver? Yes   List who call center can speak with Son   Person spoke with today (if not patient) and relationship Son   Meds reviewed with patient/caregiver? Yes   Is the patient having any side effects they believe may be caused by any medication additions or changes? No   Does the patient have all medications ordered at discharge? Yes   Is the patient taking all medications as directed (includes completed medication regime)? Yes   Comments regarding appointments S/W patient's Son, he is unsure if patient has PCP follow up scheduled   Does the patient have a primary care provider?  Yes   Has the patient kept scheduled appointments due by today? N/A   What is the Home health agency?  [facility]    Has home health visited the patient within 72 hours of discharge? Yes   Pulse Ox monitoring --  [Son does not know if O2 sats checked at home]   Did the patient receive a copy of their discharge instructions? Yes   Nursing interventions Reviewed instructions with patient   What is the patient's perception of their health status since discharge? Improving   Nursing Interventions Nurse provided patient education   Are the patient's immunizations up to date?  --  [Son can not verify immunizations.]   If the patient is a current smoker, are they able to teach back resources for cessation? Not a smoker   Is the patient/caregiver able to teach back the hierarchy of who to call/visit for symptoms/problems? PCP, Specialist, Home health nurse, Urgent Care, ED, 911 Yes   Is the patient able to teach back COPD zones? Yes   Nursing interventions Education provided on  various zones   Patient reports what zone on this call? Green Zone   Green Zone Reports doing well, Usual activity and exercise level, Slept well last night, Breathing without shortness of breath, Usual amounts of cough and phlegm/mucous, Appetite is good   Green Zone interventions: Take daily medications, Use oxygen as prescribed, Continue regular exercise/diet plan, At all times avoid cigarette smoking, vaping and inhaled irritants   Is the patient/caregiver able to teach back signs and symptoms of worsening condition: Fever/chills, Shortness of breath, Chest pain   Is the patient/caregiver able to teach back importance of completing antibiotic course of treatment? Yes   Week 1 call completed? Yes   Is the patient interested in additional calls from an ambulatory ?  NOTE:  applies to high risk patients requiring additional follow-up. Nayely DURHAM - Registered Nurse

## 2023-07-07 PROBLEM — I48.19 ATRIAL FIBRILLATION, PERSISTENT: Status: ACTIVE | Noted: 2023-02-24

## 2023-07-07 PROBLEM — R06.09 DYSPNEA ON EXERTION: Status: ACTIVE | Noted: 2023-07-07

## 2023-07-07 PROBLEM — R60.0 BILATERAL LOWER EXTREMITY EDEMA: Status: ACTIVE | Noted: 2023-07-07

## 2023-07-26 ENCOUNTER — OFFICE VISIT (OUTPATIENT)
Dept: FAMILY MEDICINE CLINIC | Facility: CLINIC | Age: 73
End: 2023-07-26
Payer: MEDICARE

## 2023-07-26 VITALS
SYSTOLIC BLOOD PRESSURE: 90 MMHG | DIASTOLIC BLOOD PRESSURE: 59 MMHG | BODY MASS INDEX: 27.44 KG/M2 | HEART RATE: 89 BPM | HEIGHT: 74 IN | WEIGHT: 213.8 LBS

## 2023-07-26 DIAGNOSIS — I10 PRIMARY HYPERTENSION: Primary | ICD-10-CM

## 2023-07-26 DIAGNOSIS — J84.9 CHRONIC INTERSTITIAL LUNG DISEASE: ICD-10-CM

## 2023-07-26 DIAGNOSIS — I48.0 PAROXYSMAL ATRIAL FIBRILLATION: ICD-10-CM

## 2023-07-26 DIAGNOSIS — J45.901 ASTHMA WITH ACUTE EXACERBATION, UNSPECIFIED ASTHMA SEVERITY, UNSPECIFIED WHETHER PERSISTENT: ICD-10-CM

## 2023-07-26 DIAGNOSIS — J18.9 HEALTHCARE-ASSOCIATED PNEUMONIA: ICD-10-CM

## 2023-07-26 PROBLEM — M51.36 BULGE OF LUMBAR DISC WITHOUT MYELOPATHY: Status: ACTIVE | Noted: 2023-07-26

## 2023-07-26 PROBLEM — M48.07 SPINAL STENOSIS OF LUMBOSACRAL REGION: Status: ACTIVE | Noted: 2020-03-24

## 2023-07-26 PROBLEM — M51.16 RADICULOPATHY DUE TO LUMBAR INTERVERTEBRAL DISC DISORDER: Status: ACTIVE | Noted: 2020-03-24

## 2023-07-26 PROBLEM — M47.819 SPONDYLOSIS WITHOUT MYELOPATHY: Status: ACTIVE | Noted: 2020-03-24

## 2023-07-26 PROBLEM — G56.00 CARPAL TUNNEL SYNDROME: Status: ACTIVE | Noted: 2021-06-09

## 2023-07-26 PROBLEM — R25.2 SPASM: Status: ACTIVE | Noted: 2020-03-24

## 2023-07-26 PROCEDURE — 3074F SYST BP LT 130 MM HG: CPT | Performed by: FAMILY MEDICINE

## 2023-07-26 PROCEDURE — 3078F DIAST BP <80 MM HG: CPT | Performed by: FAMILY MEDICINE

## 2023-07-26 PROCEDURE — 99214 OFFICE O/P EST MOD 30 MIN: CPT | Performed by: FAMILY MEDICINE

## 2023-07-26 RX ORDER — BUMETANIDE 1 MG/1
1 TABLET ORAL
COMMUNITY
Start: 2023-07-18 | End: 2024-07-17

## 2023-07-26 RX ORDER — HYDROCHLOROTHIAZIDE 12.5 MG/1
12.5 TABLET ORAL
COMMUNITY
Start: 2023-07-24 | End: 2024-07-23

## 2023-07-26 RX ORDER — CEPHALEXIN 500 MG/1
CAPSULE ORAL
COMMUNITY

## 2023-08-08 ENCOUNTER — OFFICE VISIT (OUTPATIENT)
Dept: CARDIOLOGY | Facility: CLINIC | Age: 73
End: 2023-08-08
Payer: MEDICARE

## 2023-08-08 VITALS
BODY MASS INDEX: 27.59 KG/M2 | HEIGHT: 74 IN | HEART RATE: 98 BPM | WEIGHT: 215 LBS | SYSTOLIC BLOOD PRESSURE: 112 MMHG | DIASTOLIC BLOOD PRESSURE: 72 MMHG

## 2023-08-08 DIAGNOSIS — I10 PRIMARY HYPERTENSION: ICD-10-CM

## 2023-08-08 DIAGNOSIS — I48.21 PERMANENT ATRIAL FIBRILLATION: ICD-10-CM

## 2023-08-08 DIAGNOSIS — I48.19 ATRIAL FIBRILLATION, PERSISTENT: Primary | ICD-10-CM

## 2023-08-08 DIAGNOSIS — I27.20 PULMONARY HYPERTENSION: ICD-10-CM

## 2023-08-08 DIAGNOSIS — J84.9 CHRONIC INTERSTITIAL LUNG DISEASE: ICD-10-CM

## 2023-08-08 DIAGNOSIS — I50.812 CHRONIC RIGHT-SIDED HEART FAILURE: ICD-10-CM

## 2023-08-08 DIAGNOSIS — R06.01 ORTHOPNEA: ICD-10-CM

## 2023-08-08 RX ORDER — DILTIAZEM HYDROCHLORIDE 180 MG/1
180 CAPSULE, COATED, EXTENDED RELEASE ORAL DAILY
Qty: 60 CAPSULE | Refills: 1
Start: 2023-08-08

## 2023-08-08 RX ORDER — SPIRONOLACTONE 25 MG/1
25 TABLET ORAL DAILY
Qty: 90 TABLET | Refills: 3 | Status: SHIPPED | OUTPATIENT
Start: 2023-08-08

## 2023-08-08 RX ORDER — BUMETANIDE 1 MG/1
2 TABLET ORAL DAILY
Qty: 60 TABLET | Refills: 11
Start: 2023-08-08 | End: 2024-08-07

## 2023-08-08 RX ORDER — METOPROLOL TARTRATE 50 MG/1
50 TABLET, FILM COATED ORAL 2 TIMES DAILY
Qty: 180 TABLET | Refills: 3 | Status: SHIPPED | OUTPATIENT
Start: 2023-08-08

## 2023-08-08 NOTE — PROGRESS NOTES
Date of Office Visit: 23  Encounter Provider: Orion Ireland MD  Place of Service: [facility] CARDIOLOGY  Patient Name: Orion Casiano  :1950  [id number]    Chief Complaint   Patient presents with    Atrial Fibrillation     Hospital f/u    Edema    Dizziness    Palpitations    Shortness of Breath        HPI: Orion Casiano is a 72 y.o. male he has a history of severe interstitial lung disease and was hospitalized in 2023 with paroxysmal A-fib of which she was not really symptomatic and we took a rate control and anticoagulation approach with him.  We also did a right heart cath on him he had a little bit of pulmonary hypertension and his wedge was probably in the 16-18 range.  His echo showed normal LV function and no significant valvular heart disease.  He is here for follow-up and he is struggling a little bit and honestly I do not think he has had great communication with his physicians so he has a lot of swelling in his feet he is seeing nephrology they had him on Lasix he did not like that they put him on hydrochlorothiazide that did not work then they saw him again and they put him on max he was urinating a lot.  So he stopped it.  He has pulmonary hypertension but he also has been on diltiazem for his A-fib for rate control.  He does not have reactive airways disease he has interstitial lung disease.  His lungs are really his main problem.  He does not have PND orthopnea no palpitations no bleeding difficulty    Past Medical History:   Diagnosis Date    Acute bronchitis     Adenomatous polyp of colon 2020    Added automatically from request for surgery [id number]    Allergic rhinitis     Asthma     Bacterial pneumonia     Bilateral lower leg cellulitis 2022    Colon polyp     Cutaneous abscess of left lower limb     Deficiency of other specified B group vitamins 2022    Dermatitis 2022    Disc disorder of lumbar region     worse at  L4-5 with protusion/herniation    Dysuria     Effusion, right hand     resolved    Essential (primary) hypertension 04/22/2022    Frequency of micturition     Gout     Hematuria     Hyperglycemia 04/22/2022    Hyperlipidemia     Hypertension     ILD (interstitial lung disease)     Insomnia     Localized swelling, mass and lump, unspecified     Low back pain     Lumbago     Microscopic hematuria     Pain in left knee     Sleep disturbances     Vertigo     resolved    Vitamin B12 deficiency        Past Surgical History:   Procedure Laterality Date    BRONCHOSCOPY  02/27/2023    Procedure: BRONCHOSCOPY WITH FLUORO, BAL, AND BIOPSIES AND ENDOBRONCHIAL ULTRASOUND WITH FNA;  Surgeon: Rubens Sheets MD;  Location: [facility] ENDOSCOPY;  Service: Pulmonary;;  INTERSTITIAL LUNG DISEASE    CARDIAC CATHETERIZATION N/A 02/24/2023    Procedure: Right Heart Cath;  Surgeon: Ravi Fonseca MD;  Location: [facility] CATH INVASIVE LOCATION;  Service: Cardiovascular;  Laterality: N/A;    COLONOSCOPY      maybe 2016    COLONOSCOPY N/A 03/11/2020    Procedure: COLONOSCOPY TO CECUM AND TI WITH COLD AND HOT SNARE AND POLYPECTOMIES;  Surgeon: Patti Navarro MD;  Location: [facility] ENDOSCOPY;  Service: Gastroenterology;  Laterality: N/A;  PRE: H/O COLON POLYPS  POST: POLYPS, HEMORRHOIDS, DIVERTICULOSIS    LUMBAR DISC SURGERY      2008    NOSE SURGERY         Social History     Socioeconomic History    Marital status:    Tobacco Use    Smoking status: Never    Smokeless tobacco: Never   Vaping Use    Vaping Use: Never used   Substance and Sexual Activity    Alcohol use: Yes     Comment: occassionally    Drug use: Not Currently     Types: Marijuana    Sexual activity: Defer       Family History   Problem Relation Age of Onset    Coronary artery disease Father        Review of Systems   Constitutional: Negative for decreased appetite, fever, malaise/fatigue and weight loss.   HENT:  Negative for nosebleeds.    Eyes:  Negative for  double vision.   Cardiovascular:  Negative for chest pain, claudication, cyanosis, dyspnea on exertion, irregular heartbeat, leg swelling, near-syncope, orthopnea, palpitations, paroxysmal nocturnal dyspnea and syncope.   Respiratory:  Negative for cough, hemoptysis and shortness of breath.    Hematologic/Lymphatic: Negative for bleeding problem.   Skin:  Negative for rash.   Musculoskeletal:  Negative for falls and myalgias.   Gastrointestinal:  Negative for hematochezia, jaundice, melena, nausea and vomiting.   Genitourinary:  Negative for hematuria.   Neurological:  Negative for dizziness and seizures.   Psychiatric/Behavioral:  Negative for altered mental status and memory loss.      Allergies   Allergen Reactions    Ibuprofen Shortness Of Breath    Aspirin          Current Outpatient Medications:     albuterol sulfate  (90 Base) MCG/ACT inhaler, Inhale 2 puffs Every 6 (Six) Hours As Needed for Wheezing., Disp: 8 g, Rfl: 0    allopurinol (ZYLOPRIM) 100 MG tablet, TAKE ONE TABLET BY MOUTH DAILY, Disp: 30 tablet, Rfl: 2    apixaban (Eliquis) 5 MG tablet tablet, Take 1 tablet by mouth 2 (Two) Times a Day., Disp: 60 tablet, Rfl: 0    azaTHIOprine (IMURAN) 50 MG tablet, Take  by mouth Daily., Disp: , Rfl:     Azelastine HCl 137 MCG/SPRAY solution, PLACE 2 SPRAYS IN EACH NOSTRIL TWO TIMES A DAY, Disp: 30 mL, Rfl: 1    bumetanide (BUMEX) 1 MG tablet, Take 2 tablets by mouth Daily. Indications: Kidney Disease, Disp: 60 tablet, Rfl: 11    cephalexin (KEFLEX) 500 MG capsule, , Disp: , Rfl:     clobetasol (TEMOVATE) 0.05 % ointment, , Disp: , Rfl:     dilTIAZem CD (CARDIZEM CD) 180 MG 24 hr capsule, Take 1 capsule by mouth Daily. Indications: Atrial Fibrillation, Disp: 60 capsule, Rfl: 1    doxepin (SINEquan) 25 MG capsule, , Disp: , Rfl:     doxycycline (VIBRAMYCIN) 100 MG capsule, Take 1 capsule by mouth 2 (Two) Times a Day., Disp: , Rfl:     flunisolide (NASALIDE) 25 MCG/ACT (0.025%) solution nasal spray, , Disp:  ", Rfl:     Fluticasone Furoate-Vilanterol (BREO ELLIPTA) 200-25 MCG/ACT inhaler, Daily., Disp: , Rfl:     hydrocortisone 2.5 % ointment, , Disp: , Rfl:     ipratropium-albuterol (DUO-NEB) 0.5-2.5 mg/3 ml nebulizer, Inhale the contents of 1 vial by nebulization 4 (Four) Times a Day., Disp: 360 mL, Rfl: 0    ketoconazole (NIZORAL) 2 % shampoo, , Disp: , Rfl:     lisinopril (PRINIVIL,ZESTRIL) 5 MG tablet, Take 1 tablet by mouth Daily., Disp: , Rfl:     methocarbamol (ROBAXIN) 500 MG tablet, TAKE ONE TABLET BY MOUTH FOUR TIMES A DAY AS NEEDED FOR BACK PAIN, Disp: 90 tablet, Rfl: 0    methotrexate 2.5 MG tablet, , Disp: , Rfl:     montelukast (Singulair) 10 MG tablet, Take 1 tablet by mouth Every Night., Disp: 90 tablet, Rfl: 1    tadalafil (CIALIS) 20 MG tablet, , Disp: , Rfl:     traZODone (DESYREL) 100 MG tablet, TAKE ONE TABLET BY MOUTH EVERY NIGHT AT BEDTIME, Disp: 30 tablet, Rfl: 2    triamcinolone (KENALOG) 0.1 % ointment, Apply 1 application  topically to the appropriate area as directed 2 (Two) Times a Day., Disp: , Rfl:     metoprolol tartrate (LOPRESSOR) 50 MG tablet, Take 1 tablet by mouth 2 (Two) Times a Day., Disp: 180 tablet, Rfl: 3    spironolactone (ALDACTONE) 25 MG tablet, Take 1 tablet by mouth Daily., Disp: 90 tablet, Rfl: 3      Objective:     Vitals:    08/08/23 1421   BP: 112/72   BP Location: Left arm   Patient Position: Sitting   Pulse: 98   Weight: 97.5 kg (215 lb)   Height: 186.7 cm (73.5\")     Body mass index is 27.98 kg/mý.    Constitutional:       Appearance: Well-developed.   Eyes:      General: No scleral icterus.  HENT:      Head: Normocephalic.   Neck:      Thyroid: No thyromegaly.      Vascular: No JVD.      Lymphadenopathy: No cervical adenopathy.   Pulmonary:      Effort: Pulmonary effort is normal.      Breath sounds: Normal breath sounds. No wheezing. No rales.   Cardiovascular:      Normal rate. Irregularly irregular rhythm.      No gallop.    Edema:     Peripheral edema absent. "   Abdominal:      Palpations: Abdomen is soft.      Tenderness: There is no abdominal tenderness.   Musculoskeletal: Normal range of motion. Skin:     General: Skin is warm and dry.      Findings: No rash.   Neurological:      Mental Status: Alert and oriented to person, place, and time.         ECG 12 Lead    Date/Time: 8/8/2023 3:04 PM  Performed by: Orion Ireland MD  Authorized by: Orion Ireland MD   Comparison: compared with previous ECG   Similar to previous ECG  Rhythm: atrial fibrillation    Clinical impression: abnormal EKG         Assessment:       Diagnosis Plan   1. Atrial fibrillation, persistent  Basic Metabolic Panel    proBNP      2. Primary hypertension  Basic Metabolic Panel    proBNP      3. Chronic interstitial lung disease  Basic Metabolic Panel    proBNP      4. Pulmonary hypertension  Basic Metabolic Panel    proBNP      5. Chronic right-sided heart failure  Basic Metabolic Panel    proBNP      6. Permanent atrial fibrillation  Basic Metabolic Panel    proBNP      7. Orthopnea  proBNP             Plan:       So he has right heart failure and given him more diuretics is probably just could cause more prerenal failure in addition he is on diltiazem which can cause edema in the legs so I want to do a couple of things.  I want to start him on metoprolol twice a day Kayla cut his diltiazem back to 1 time a day.  Minimum follow-up with Mariza in 10 days if his rate is okay then we can take him off diltiazem and we might even have to increase his metoprolol little bit to 75 twice a day we will see.  We are also going to start him on Aldactone and see if that can help with his right heart failure that is probably the diuretic of choice for this.  We are going to take the Bumex 1 time a day not more than that that this can make her more prerenal.  I am also going to have him come back and see Mariza in 10 days and get some blood work done and I would like to see him in 3 months hopefully this will  make him feel better but we will know that the treatment of right heart failure can be difficult and a challenge.    Return in about 3 months (around 11/8/2023), or see Mariza in 10 days.     As always, it has been a pleasure to participate in your patient's care.      Sincerely,       Orion Ireland MD

## 2023-08-13 DIAGNOSIS — I48.91 ATRIAL FIBRILLATION, UNSPECIFIED TYPE: ICD-10-CM

## 2023-08-13 RX ORDER — APIXABAN 5 MG/1
TABLET, FILM COATED ORAL
Qty: 60 TABLET | Refills: 0 | Status: SHIPPED | OUTPATIENT
Start: 2023-08-13

## 2023-08-15 ENCOUNTER — TELEPHONE (OUTPATIENT)
Dept: FAMILY MEDICINE CLINIC | Facility: CLINIC | Age: 73
End: 2023-08-15
Payer: MEDICARE

## 2023-08-15 NOTE — TELEPHONE ENCOUNTER
Caller: Orion Casiano    Relationship to patient: Self    Best call back number: [phone] OR SON CELL  [phone] IF PATIENT DOES NOT ANSWER.      Patient is needing: PATIENT STATES THAT HE TOOK FUROSEMIDE BACK WHEN HE WAS LAST IN THE HOSPITAL AND IT TOOK ALL THE FLUIDS OUT OF HIS BODY AND NOW HE HAS VERY DRY SKIN THAT IS CRACKING ON HIS CALVES TO HIS ANKLES, FEET AND HANDS.    PATIENT STATES HE HAS DIFFERENT AREAS THAT ARE ACTIVE  BLEEDING.  PATIENT WENT TO THE HOSPITAL 0N 8/13 AT [facility] AND WAS TOLD THAT HIS VITALS AND LABS WERE GOOD AND GAVE HIM A PRESCRIPTION, CLOTRIMAZOLE AND BETAMETHASONE WHICH HE HAS BEEN USING BUT DOES NOT SEE THAT IT IS DOING ANYTHING.     PATIENT IS REQUESTING A CALL BACK FROM DR. DALY TO ADVISE HIM WHAT HE SHOULD DO ABOUT THIS DRY SKIN ISSUES THAT HE CONSIDERS SEVERE.   HE STATES HE CANNOT PUT SHOES OR SANDALS ON AS  A RESULT.   PATIENT SAW A PODIATRIST, AT [facility] AND [facility] AND TOLD HIM TO PUT ON A CREAM AND RETURN IN 3 WEEKS.        PLEASE CALL THE PATIENT TO ADVISE.

## 2023-08-16 ENCOUNTER — TELEPHONE (OUTPATIENT)
Dept: FAMILY MEDICINE CLINIC | Facility: CLINIC | Age: 73
End: 2023-08-16
Payer: MEDICARE

## 2023-08-16 NOTE — TELEPHONE ENCOUNTER
Be sure to drink plenty of water and continue the prescription cream. He can also use aquaphor or lac hydrin after he applies the prescription medication.  Also, I would like him to see a dermatologist to see if they have any additional recommendations. If we can find someone that could see him ASAP that would be great.

## 2023-08-16 NOTE — TELEPHONE ENCOUNTER
Ms Avelino BOYLE saw this patient today for a rash. She says the patient seems a little confused at the events that lead up to the rash, and was a poor historian about his medical care  So she would like to talk to you directly ASAP. She needs to start treatment on him, but has some concerns.

## 2023-09-09 DIAGNOSIS — I48.91 ATRIAL FIBRILLATION, UNSPECIFIED TYPE: ICD-10-CM

## 2023-09-09 RX ORDER — APIXABAN 5 MG/1
TABLET, FILM COATED ORAL
Qty: 60 TABLET | Refills: 0 | Status: SHIPPED | OUTPATIENT
Start: 2023-09-09

## 2023-09-26 ENCOUNTER — TELEPHONE (OUTPATIENT)
Dept: FAMILY MEDICINE CLINIC | Facility: CLINIC | Age: 73
End: 2023-09-26
Payer: MEDICARE

## 2023-09-26 NOTE — TELEPHONE ENCOUNTER
HUB TO READ:     Left 2 messages for pt to call back. Dr. Galvan is needing to know if patient is still using O2 because we got an order

## (undated) DEVICE — SNAR POLYP SENSATION STDOVL 27 240 BX40

## (undated) DEVICE — SINGLE-USE BIOPSY FORCEPS: Brand: RADIAL JAW 4

## (undated) DEVICE — LN SMPL CO2 SHTRM SD STREAM W/M LUER

## (undated) DEVICE — Device: Brand: BALLOON

## (undated) DEVICE — LARGE BORE STOPCOCK WITH EXTENSION SET, MALE LUER LOCK ADAPTER WITH RETRACTABLE COLLAR

## (undated) DEVICE — PK CATH CARD 40

## (undated) DEVICE — ADAPT SWVL FIBROPTIC BRONCH

## (undated) DEVICE — THE SINGLE USE ETRAP – POLYP TRAP IS USED FOR SUCTION RETRIEVAL OF ENDOSCOPICALLY REMOVED POLYPS.: Brand: ETRAP

## (undated) DEVICE — SINGLE USE SUCTION VALVE MAJ-209: Brand: SINGLE USE SUCTION VALVE (STERILE)

## (undated) DEVICE — CANN O2 ETCO2 FITS ALL CONN CO2 SMPL A/ 7IN DISP LF

## (undated) DEVICE — FRCP BX RADJAW4 PULM WO NDL STD1.8X2 100

## (undated) DEVICE — ADAPT CLN BIOGUARD AIR/H2O DISP

## (undated) DEVICE — VITAL SIGNS™ JACKSON-REES CIRCUITS: Brand: VITAL SIGNS™

## (undated) DEVICE — MSK AIRWY LARYNG LMA PILOT SZ4

## (undated) DEVICE — SINGLE USE ASPIRATION NEEDLE: Brand: SINGLE USE ASPIRATION NEEDLE

## (undated) DEVICE — TUBING, SUCTION, 1/4" X 10', STRAIGHT: Brand: MEDLINE

## (undated) DEVICE — Device

## (undated) DEVICE — TRAP,MUCUS SPECIMEN, 80CC: Brand: MEDLINE

## (undated) DEVICE — KT MANIFLD CARDIAC

## (undated) DEVICE — SINGLE USE BIOPSY VALVE MAJ-210: Brand: SINGLE USE BIOPSY VALVE (STERILE)

## (undated) DEVICE — SENSR O2 OXIMAX FNGR A/ 18IN NONSTR

## (undated) DEVICE — BALN PRESS WEDGE 5F 110CM

## (undated) DEVICE — KT ORCA ORCAPOD DISP STRL

## (undated) DEVICE — GLIDESHEATH BASIC HYDROPHILIC COATED INTRODUCER SHEATH: Brand: GLIDESHEATH

## (undated) DEVICE — THE TORRENT IRRIGATION SCOPE CONNECTOR IS USED WITH THE TORRENT IRRIGATION TUBING TO PROVIDE IRRIGATION FLUIDS SUCH AS STERILE WATER DURING GASTROINTESTINAL ENDOSCOPIC PROCEDURES WHEN USED IN CONJUNCTION WITH AN IRRIGATION PUMP (OR ELECTROSURGICAL UNIT).: Brand: TORRENT